# Patient Record
Sex: FEMALE | Race: WHITE | NOT HISPANIC OR LATINO | URBAN - METROPOLITAN AREA
[De-identification: names, ages, dates, MRNs, and addresses within clinical notes are randomized per-mention and may not be internally consistent; named-entity substitution may affect disease eponyms.]

---

## 2018-02-20 ENCOUNTER — INPATIENT (INPATIENT)
Facility: HOSPITAL | Age: 63
LOS: 6 days | Discharge: HOME | DRG: 603 | End: 2018-02-27
Attending: INTERNAL MEDICINE | Admitting: INTERNAL MEDICINE
Payer: MEDICAID

## 2018-02-20 VITALS
HEIGHT: 60 IN | SYSTOLIC BLOOD PRESSURE: 111 MMHG | HEART RATE: 98 BPM | TEMPERATURE: 98 F | WEIGHT: 199.96 LBS | OXYGEN SATURATION: 98 % | RESPIRATION RATE: 16 BRPM | DIASTOLIC BLOOD PRESSURE: 70 MMHG

## 2018-02-20 DIAGNOSIS — L03.90 CELLULITIS, UNSPECIFIED: ICD-10-CM

## 2018-02-20 DIAGNOSIS — Z29.9 ENCOUNTER FOR PROPHYLACTIC MEASURES, UNSPECIFIED: ICD-10-CM

## 2018-02-20 DIAGNOSIS — K74.60 UNSPECIFIED CIRRHOSIS OF LIVER: ICD-10-CM

## 2018-02-20 LAB
ALBUMIN SERPL ELPH-MCNC: 2.3 G/DL — LOW (ref 3.5–5)
ALP SERPL-CCNC: 110 U/L — SIGNIFICANT CHANGE UP (ref 40–120)
ALT FLD-CCNC: 43 U/L DA — SIGNIFICANT CHANGE UP (ref 10–60)
ANION GAP SERPL CALC-SCNC: 6 MMOL/L — SIGNIFICANT CHANGE UP (ref 5–17)
APTT BLD: 44.7 SEC — HIGH (ref 27.5–37.4)
AST SERPL-CCNC: 54 U/L — HIGH (ref 10–40)
BASOPHILS # BLD AUTO: 0.1 K/UL — SIGNIFICANT CHANGE UP (ref 0–0.2)
BASOPHILS NFR BLD AUTO: 0.5 % — SIGNIFICANT CHANGE UP (ref 0–2)
BILIRUB SERPL-MCNC: 3.7 MG/DL — HIGH (ref 0.2–1.2)
BUN SERPL-MCNC: 25 MG/DL — HIGH (ref 7–18)
CALCIUM SERPL-MCNC: 8.6 MG/DL — SIGNIFICANT CHANGE UP (ref 8.4–10.5)
CHLORIDE SERPL-SCNC: 101 MMOL/L — SIGNIFICANT CHANGE UP (ref 96–108)
CO2 SERPL-SCNC: 27 MMOL/L — SIGNIFICANT CHANGE UP (ref 22–31)
CREAT SERPL-MCNC: 0.92 MG/DL — SIGNIFICANT CHANGE UP (ref 0.5–1.3)
EOSINOPHIL # BLD AUTO: 0.1 K/UL — SIGNIFICANT CHANGE UP (ref 0–0.5)
EOSINOPHIL NFR BLD AUTO: 0.9 % — SIGNIFICANT CHANGE UP (ref 0–6)
GLUCOSE SERPL-MCNC: 97 MG/DL — SIGNIFICANT CHANGE UP (ref 70–99)
HCT VFR BLD CALC: 42.6 % — SIGNIFICANT CHANGE UP (ref 34.5–45)
HGB BLD-MCNC: 13.7 G/DL — SIGNIFICANT CHANGE UP (ref 11.5–15.5)
INR BLD: 2.13 RATIO — HIGH (ref 0.88–1.16)
LACTATE SERPL-SCNC: 2 MMOL/L — SIGNIFICANT CHANGE UP (ref 0.7–2)
LYMPHOCYTES # BLD AUTO: 0.8 K/UL — LOW (ref 1–3.3)
LYMPHOCYTES # BLD AUTO: 7.1 % — LOW (ref 13–44)
MCHC RBC-ENTMCNC: 32.3 GM/DL — SIGNIFICANT CHANGE UP (ref 32–36)
MCHC RBC-ENTMCNC: 33.2 PG — SIGNIFICANT CHANGE UP (ref 27–34)
MCV RBC AUTO: 102.9 FL — HIGH (ref 80–100)
MONOCYTES # BLD AUTO: 0.7 K/UL — SIGNIFICANT CHANGE UP (ref 0–0.9)
MONOCYTES NFR BLD AUTO: 6.3 % — SIGNIFICANT CHANGE UP (ref 2–14)
NEUTROPHILS # BLD AUTO: 9.3 K/UL — HIGH (ref 1.8–7.4)
NEUTROPHILS NFR BLD AUTO: 85.3 % — HIGH (ref 43–77)
PLATELET # BLD AUTO: 34 K/UL — LOW (ref 150–400)
POTASSIUM SERPL-MCNC: 3.8 MMOL/L — SIGNIFICANT CHANGE UP (ref 3.5–5.3)
POTASSIUM SERPL-SCNC: 3.8 MMOL/L — SIGNIFICANT CHANGE UP (ref 3.5–5.3)
PROT SERPL-MCNC: 6.6 G/DL — SIGNIFICANT CHANGE UP (ref 6–8.3)
PROTHROM AB SERPL-ACNC: 23.6 SEC — HIGH (ref 9.8–12.7)
RBC # BLD: 4.13 M/UL — SIGNIFICANT CHANGE UP (ref 3.8–5.2)
RBC # FLD: 14.1 % — SIGNIFICANT CHANGE UP (ref 10.3–14.5)
SODIUM SERPL-SCNC: 134 MMOL/L — LOW (ref 135–145)
WBC # BLD: 10.9 K/UL — HIGH (ref 3.8–10.5)
WBC # FLD AUTO: 10.9 K/UL — HIGH (ref 3.8–10.5)

## 2018-02-20 PROCEDURE — 99285 EMERGENCY DEPT VISIT HI MDM: CPT | Mod: 25

## 2018-02-20 PROCEDURE — 99223 1ST HOSP IP/OBS HIGH 75: CPT | Mod: GC

## 2018-02-20 PROCEDURE — 76700 US EXAM ABDOM COMPLETE: CPT | Mod: 26

## 2018-02-20 PROCEDURE — 93971 EXTREMITY STUDY: CPT | Mod: 26,RT

## 2018-02-20 PROCEDURE — 71045 X-RAY EXAM CHEST 1 VIEW: CPT | Mod: 26

## 2018-02-20 RX ORDER — VANCOMYCIN HCL 1 G
1000 VIAL (EA) INTRAVENOUS ONCE
Qty: 0 | Refills: 0 | Status: COMPLETED | OUTPATIENT
Start: 2018-02-20 | End: 2018-02-20

## 2018-02-20 RX ORDER — TRAMADOL HYDROCHLORIDE 50 MG/1
25 TABLET ORAL EVERY 8 HOURS
Qty: 0 | Refills: 0 | Status: DISCONTINUED | OUTPATIENT
Start: 2018-02-20 | End: 2018-02-24

## 2018-02-20 RX ORDER — SODIUM CHLORIDE 9 MG/ML
1000 INJECTION INTRAMUSCULAR; INTRAVENOUS; SUBCUTANEOUS
Qty: 0 | Refills: 0 | Status: DISCONTINUED | OUTPATIENT
Start: 2018-02-20 | End: 2018-02-27

## 2018-02-20 RX ORDER — SPIRONOLACTONE 25 MG/1
25 TABLET, FILM COATED ORAL DAILY
Qty: 0 | Refills: 0 | Status: DISCONTINUED | OUTPATIENT
Start: 2018-02-20 | End: 2018-02-20

## 2018-02-20 RX ORDER — FUROSEMIDE 40 MG
40 TABLET ORAL DAILY
Qty: 0 | Refills: 0 | Status: DISCONTINUED | OUTPATIENT
Start: 2018-02-20 | End: 2018-02-27

## 2018-02-20 RX ORDER — VANCOMYCIN HCL 1 G
1250 VIAL (EA) INTRAVENOUS EVERY 12 HOURS
Qty: 0 | Refills: 0 | Status: DISCONTINUED | OUTPATIENT
Start: 2018-02-20 | End: 2018-02-22

## 2018-02-20 RX ORDER — AMPICILLIN SODIUM AND SULBACTAM SODIUM 250; 125 MG/ML; MG/ML
3 INJECTION, POWDER, FOR SUSPENSION INTRAMUSCULAR; INTRAVENOUS EVERY 6 HOURS
Qty: 0 | Refills: 0 | Status: DISCONTINUED | OUTPATIENT
Start: 2018-02-20 | End: 2018-02-20

## 2018-02-20 RX ORDER — VANCOMYCIN HCL 1 G
1000 VIAL (EA) INTRAVENOUS ONCE
Qty: 0 | Refills: 0 | Status: DISCONTINUED | OUTPATIENT
Start: 2018-02-20 | End: 2018-02-20

## 2018-02-20 RX ORDER — VANCOMYCIN HCL 1 G
1250 VIAL (EA) INTRAVENOUS EVERY 12 HOURS
Qty: 0 | Refills: 0 | Status: DISCONTINUED | OUTPATIENT
Start: 2018-02-20 | End: 2018-02-20

## 2018-02-20 RX ORDER — MORPHINE SULFATE 50 MG/1
4 CAPSULE, EXTENDED RELEASE ORAL ONCE
Qty: 0 | Refills: 0 | Status: DISCONTINUED | OUTPATIENT
Start: 2018-02-20 | End: 2018-02-20

## 2018-02-20 RX ORDER — PIPERACILLIN AND TAZOBACTAM 4; .5 G/20ML; G/20ML
3.38 INJECTION, POWDER, LYOPHILIZED, FOR SOLUTION INTRAVENOUS EVERY 8 HOURS
Qty: 0 | Refills: 0 | Status: DISCONTINUED | OUTPATIENT
Start: 2018-02-20 | End: 2018-02-20

## 2018-02-20 RX ORDER — FUROSEMIDE 40 MG
40 TABLET ORAL DAILY
Qty: 0 | Refills: 0 | Status: DISCONTINUED | OUTPATIENT
Start: 2018-02-20 | End: 2018-02-20

## 2018-02-20 RX ORDER — SPIRONOLACTONE 25 MG/1
25 TABLET, FILM COATED ORAL DAILY
Qty: 0 | Refills: 0 | Status: DISCONTINUED | OUTPATIENT
Start: 2018-02-20 | End: 2018-02-22

## 2018-02-20 RX ORDER — SODIUM CHLORIDE 9 MG/ML
1000 INJECTION INTRAMUSCULAR; INTRAVENOUS; SUBCUTANEOUS ONCE
Qty: 0 | Refills: 0 | Status: COMPLETED | OUTPATIENT
Start: 2018-02-20 | End: 2018-02-20

## 2018-02-20 RX ORDER — PIPERACILLIN AND TAZOBACTAM 4; .5 G/20ML; G/20ML
3.38 INJECTION, POWDER, LYOPHILIZED, FOR SOLUTION INTRAVENOUS ONCE
Qty: 0 | Refills: 0 | Status: DISCONTINUED | OUTPATIENT
Start: 2018-02-20 | End: 2018-02-20

## 2018-02-20 RX ADMIN — TRAMADOL HYDROCHLORIDE 25 MILLIGRAM(S): 50 TABLET ORAL at 19:23

## 2018-02-20 RX ADMIN — Medication 166.67 MILLIGRAM(S): at 19:24

## 2018-02-20 RX ADMIN — TRAMADOL HYDROCHLORIDE 25 MILLIGRAM(S): 50 TABLET ORAL at 21:01

## 2018-02-20 RX ADMIN — SODIUM CHLORIDE 60 MILLILITER(S): 9 INJECTION INTRAMUSCULAR; INTRAVENOUS; SUBCUTANEOUS at 19:24

## 2018-02-20 RX ADMIN — PIPERACILLIN AND TAZOBACTAM 25 GRAM(S): 4; .5 INJECTION, POWDER, LYOPHILIZED, FOR SOLUTION INTRAVENOUS at 14:34

## 2018-02-20 RX ADMIN — SODIUM CHLORIDE 60 MILLILITER(S): 9 INJECTION INTRAMUSCULAR; INTRAVENOUS; SUBCUTANEOUS at 19:23

## 2018-02-20 RX ADMIN — SODIUM CHLORIDE 60 MILLILITER(S): 9 INJECTION INTRAMUSCULAR; INTRAVENOUS; SUBCUTANEOUS at 14:34

## 2018-02-20 RX ADMIN — MORPHINE SULFATE 4 MILLIGRAM(S): 50 CAPSULE, EXTENDED RELEASE ORAL at 06:56

## 2018-02-20 RX ADMIN — SODIUM CHLORIDE 1000 MILLILITER(S): 9 INJECTION INTRAMUSCULAR; INTRAVENOUS; SUBCUTANEOUS at 06:57

## 2018-02-20 RX ADMIN — Medication 250 MILLIGRAM(S): at 06:56

## 2018-02-20 NOTE — H&P ADULT - RS GEN PE MLT RESP DETAILS PC
normal/clear to auscultation bilaterally/respirations non-labored/airway patent/breath sounds equal/good air movement

## 2018-02-20 NOTE — H&P ADULT - ATTENDING COMMENTS
Patient was seen and examined at bedside,  Briefly, this is a 62 years old lady, Khmer speaking, History again obtained with assistance of  Edward # [id number], ]  patient was recently admitted to [facility] where she follows and receives her medications, she was given IV antibiotic for same condition: cellulitis of the Right LE, and was discharged on Bactrim which she finished 1week ago, however her leg restarted to swell and hurt again.   she is known to have liver cirrhosis, diagnosed 2-3 years ago, denies any previous paracentesis or GI bleed, however states she had some confusion before, and maybe was admitted to the hospital because of that. She is on Rifaximin.  When asked about what she takes for her pain, she answered Tylenol, says was never told that she is not supposed to take it.   Physical exam:  vitals  HEENT: Neck supple  heart: S1 S2 normal  Abdomen: not distended, says is tender to the right upper quadrant.   Chest: Clear  LE: Left LE: no edema  Right LE: firy red looking, edematous, warm, with extension of the cellulitis up to the thigh.     A/P  1- Right Lower extremity Cellulitis:  failed inpatient and outpatient treatment,   unclear etiology,   ID consult: Dr. Simental  meanwhile start broad spectrum antibiotics including coverage for MRSA  vanco and zosyn.   leg elevation  continue Lasix    2- Liver cirrhosis: compensated at this moment:  denies any alcohol or hepatitis, says she does not know why it happened  Needs GI outpatient     3- Thrombocytopenia: secondary to liver cirrhosis.  4- Macrocytic anemia: could be secondary to liver cirrhosis  check b12 and folate levels.

## 2018-02-20 NOTE — H&P ADULT - NEGATIVE OPHTHALMOLOGIC SYMPTOMS
no discharge R/no pain L/no scleral injection R/no lacrimation L/no blurred vision R/no diplopia/no photophobia/no discharge L/no pain R/no irritation R/no loss of vision L/no loss of vision R/no scleral injection L/no irritation L/no lacrimation R/no blurred vision L

## 2018-02-20 NOTE — H&P ADULT - PROBLEM SELECTOR PLAN 2
Patient with underlying cirrhosis, Patient says that etiology of cirrhosis is not known, Will send hepatitis panel, Us hepatic, Has low albumin, elevated INR, plt count is still testing  plan to c/w rifaximin, lactulose and lasix as tolerated by her BP  Will send ammonia for AM Patient with underlying cirrhosis, Patient says that etiology of cirrhosis is not known, Will send hepatitis panel, Us Abd to evaluate for cirrhosis and ascitis,   Has low albumin, elevated INR, plt count is still testing  plan to c/w rifaximin, lactulose and lasix as tolerated by her BP  Will send ammonia for AM, and total and direct bili  Child Benton score for for mortality shows a life expectancy of 1-3 yrs  MELD-Na score: 13, indicating 7-10% 30 day mortality Patient with underlying cirrhosis, Patient says that etiology of cirrhosis is not known, Will send hepatitis panel, Us Abd to evaluate for cirrhosis and ascitis,   Has low albumin, elevated INR, plt count is 34  plan to c/w rifaximin, lactulose and lasix as tolerated by her BP  Will send ammonia for AM, and total and direct bili  Child Benton score for for mortality shows a life expectancy of 1-3 yrs  MELD-Na score: 13, indicating 7-10% 30 day mortality Patient with underlying cirrhosis, Patient says that etiology of cirrhosis is not known, Will send hepatitis panel, Us Abd to evaluate for cirrhosis and ascitis,   Has low albumin, elevated INR, plt count is 34  plan to c/w rifaximin, aldactone and lasix as tolerated by her BP  Will send ammonia for AM, and total and direct bili  Child Benton score for for mortality shows a life expectancy of 1-3 yrs  MELD-Na score: 13, indicating 7-10% 30 day mortality

## 2018-02-20 NOTE — H&P ADULT - HISTORY OF PRESENT ILLNESS
Patient is a 62/F from home, lives with her daughter, AAO*3, Luxembourger speaking, with PMHx of liver cirrhosis (on rifaximin, aldectone and laxix), does not know the etiology of the cirrhosis, and cellulitis in the same leg comes in with redness, pain and swelling over the right leg since saturday. patient was seen at another hospital in [place] 1 month ago, was Dx with cellulitis, given Abx that she finished 2 weeks ago, however patient comes in with recurrent symptoms. Also admits to subjective fever and chills for the past 1-2 days. Patient does not have a PCP, her PCP has moved to [place]. She said that  gave her Medicaid and she gets her meds from [facility]. not on blood thinner and no h/o blood clots. ROS negative for SOB, cough, headache, dizziness, abdominal pain, N/V/D or any other symptoms  Never smoker/drinker, FHx of Ca stomach in mother and Shx of Breast Cancer ( 15 years ago, s/p Sx) Patient is a 62/F from home, lives with her daughter, AAO*3, Nicaraguan speaking, with PMHx of liver cirrhosis (on rifaximin, aldectone and laxix), does not know the etiology of the cirrhosis, erythema Multiforme rash in the past, and cellulitis in the same leg comes in with redness, pain and swelling over the right leg since saturday. patient was seen at another hospital in [place] 1 month ago, was Dx with cellulitis, given Abx that she finished 2 weeks ago, however patient comes in with recurrent symptoms. Also admits to subjective fever and chills for the past 1-2 days. Patient does not have a PCP, her PCP has moved to [place]. She said that  gave her Medicaid and she gets her meds from [facility]. not on blood thinner and no h/o blood clots. ROS negative for SOB, cough, headache, dizziness, abdominal pain, N/V/D or any other symptoms  Never smoker/drinker, FHx of Ca stomach in mother and Shx of Breast Cancer ( 15 years ago, s/p Sx) Patient is a 62/F from home, lives with her daughter, AAO*3, Panamanian speaking, with PMHx of liver cirrhosis (on rifaximin, aldactone and laxix), does not know the etiology of the cirrhosis, erythema Multiforme rash in the past, and cellulitis in the same leg comes in with redness, pain and swelling over the right leg since saturday. patient was seen at another hospital in [place] 1 month ago, was Dx with cellulitis, given Abx that she finished 2 weeks ago, however patient comes in with recurrent symptoms. Also admits to subjective fever and chills for the past 1-2 days. Patient does not have a PCP, her PCP has moved to [place]. She said that  gave her Medicaid and she gets her meds from [facility]. not on blood thinner and no h/o blood clots. ROS negative for SOB, cough, headache, dizziness, abdominal pain, N/V/D or any other symptoms  Never smoker/drinker, FHx of Ca stomach in mother and Shx of Breast Cancer ( 15 years ago, s/p Sx) Patient is a 62/F from home, lives with her daughter, AAO*3, Chadian speaking, with PMHx of liver cirrhosis (on rifaximin, aldactone and laxix), does not know the etiology of the cirrhosis, erythema Multiforme rash in the past, and cellulitis in the same leg comes in with redness, pain and swelling over the right leg since saturday. patient was seen at another hospital in [place] 1 month ago, was Dx with cellulitis, given Abx that she finished 2 weeks ago, however patient comes in with recurrent symptoms. Also admits to subjective fever and chills for the past 1-2 days. Patient does not have a PCP, her PCP has moved to [place]. She said that  gave her Medicaid and she gets her meds from [facility]. not on blood thinner and no h/o blood clots. ROS negative for SOB, cough, headache, dizziness, abdominal pain, N/V/D or any other symptoms  Never smoker/drinker, FHx of Ca stomach in mother and Shx of Breast Cancer ( 15 years ago, s/p L lumpectomy) and R leg Sx (? for fracture), after she fell in her kitchen

## 2018-02-20 NOTE — H&P ADULT - ASSESSMENT
Patient is a 62/F from home, lives with her daughter, AAO*3, Ukrainian speaking, with PMHx of liver cirrhosis (on rifaximin, aldectone and laxix), does not know the etiology of the cirrhosis, erythema Multiforme rash in the past, and cellulitis in the same leg comes in with redness, pain and swelling over the right leg since saturday.  Patient is afebrile in the ED, BP on the lower side, latest reading was 92/35, got 1 L NS bolus, however patient is asymptomatic. no leucocytosis on the CBC, has macrocytosis: 102.9, INR is elevated, 2.13, could be 2/2 to liver cirrhosis, lactate is WNL, albumin is 2.3, Plt count is still testing.  s/p vanc and zosyn in the ED, and morphine 4mg IV*1 for pain  Admitted for RLE cellulitis Patient is a 62/F from home, lives with her daughter, AAO*3, Divehi speaking, with PMHx of liver cirrhosis (on rifaximin, aldactone and laxix), does not know the etiology of the cirrhosis, erythema Multiforme rash in the past, and cellulitis in the same leg comes in with redness, pain and swelling over the right leg since saturday.  Patient is afebrile in the ED, BP on the lower side, latest reading was 92/35, got 1 L NS bolus, however patient is asymptomatic. no leucocytosis on the CBC, has macrocytosis: 102.9, INR is elevated, 2.13, could be 2/2 to liver cirrhosis, lactate is WNL, albumin is 2.3, Plt count is still testing.  s/p vanc and zosyn in the ED, and morphine 4mg IV*1 for pain  Admitted for RLE cellulitis Patient is a 62/F from home, lives with her daughter, AAO*3, Hungarian speaking, with PMHx of liver cirrhosis (on rifaximin, aldactone and laxix), does not know the etiology of the cirrhosis, erythema Multiforme rash in the past, and cellulitis in the same leg comes in with redness, pain and swelling over the right leg since saturday.  Patient is afebrile in the ED, BP on the lower side, latest reading was 92/35, got 1 L NS bolus, however patient is asymptomatic. no leucocytosis on the CBC, has macrocytosis: 102.9, INR is elevated, 2.13, could be 2/2 to liver cirrhosis, lactate is WNL, albumin is 2.3, Plt count is 34  s/p vanc and zosyn in the ED, and morphine 4mg IV*1 for pain  Admitted for RLE cellulitis

## 2018-02-20 NOTE — H&P ADULT - NEGATIVE NEUROLOGICAL SYMPTOMS
no confusion/no loss of consciousness/no hemiparesis/no transient paralysis/no weakness/no facial palsy/no syncope/no tremors/no headache/no generalized seizures/no vertigo/no loss of sensation/no focal seizures/no difficulty walking/no paresthesias

## 2018-02-20 NOTE — H&P ADULT - NEGATIVE MUSCULOSKELETAL SYMPTOMS
no muscle cramps/no arm pain L/no back pain/no muscle weakness/no neck pain/no leg pain R/no joint swelling/no arthralgia/no arthritis/no myalgia/no stiffness/no arm pain R/no leg pain L

## 2018-02-20 NOTE — H&P ADULT - NEGATIVE SKIN SYMPTOMS
no brittle nails/no dryness/no change in size/color of mole/no pitted nails/no hair loss/no tumor/no itching/no rash

## 2018-02-20 NOTE — H&P ADULT - GASTROINTESTINAL DETAILS
no masses palpable/bowel sounds normal/no distention/nontender/normal/soft bowel sounds normal/no masses palpable/normal/soft/nontender

## 2018-02-20 NOTE — H&P ADULT - NEGATIVE ENMT SYMPTOMS
no nose bleeds/no dry mouth/no nasal congestion/no nasal obstruction/no hearing difficulty/no vertigo/no nasal discharge/no throat pain/no dysphagia/no ear pain/no tinnitus/no recurrent cold sores/no post-nasal discharge/no abnormal taste sensation/no gum bleeding/no sinus symptoms

## 2018-02-20 NOTE — CONSULT NOTE ADULT - SUBJECTIVE AND OBJECTIVE BOX
HPI:  Patient is a 62/F from home, lives with her daughter, AAO*3, Marshallese speaking, with PMHx of liver cirrhosis (on rifaximin, aldactone and laxix), does not know the etiology of the cirrhosis, erythema Multiforme rash in the past, and cellulitis in the same leg comes in with redness, pain and swelling over the right leg since saturday. patient was seen at another hospital in [place] 1 month ago, was Dx with cellulitis, given Abx that she finished 2 weeks ago, however patient comes in with recurrent symptoms. Also admits to subjective fever and chills for the past 1-2 days. Patient does not have a PCP, her PCP has moved to [place]. She said that  gave her Medicaid and she gets her meds from [facility].  ROS negative for SOB, cough, headache, dizziness, abdominal pain, N/V/D or any other symptoms  Never smoker/drinker, FHx of Ca stomach in mother and Shx of Breast Cancer ( 15 years ago, s/p L lumpectomy) and R leg Sx (? for fracture), after she fell in her kitchen       PAST MEDICAL & SURGICAL HISTORY:  Hepatitis C  S/P hysterectomy      No Known Allergies      Meds:  furosemide    Tablet 40 milliGRAM(s) Oral daily  piperacillin/tazobactam IVPB. 3.375 Gram(s) IV Intermittent every 8 hours  rifaximin 550 milliGRAM(s) Oral two times a day  sodium chloride 0.9%. 1000 milliLiter(s) IV Continuous <Continuous>  spironolactone 25 milliGRAM(s) Oral daily  traMADol 25 milliGRAM(s) Oral every 8 hours PRN  vancomycin  IVPB 1000 milliGRAM(s) IV Intermittent once      SOCIAL HISTORY:  Smoker:  no    FAMILY HISTORY: as above      VITALS:  Vital Signs Last 24 Hrs  T(C): 36.6 (20 Feb 2018 14:43), Max: 36.8 (20 Feb 2018 02:12)  T(F): 97.8 (20 Feb 2018 14:43), Max: 98.3 (20 Feb 2018 02:12)  HR: 91 (20 Feb 2018 14:43) (89 - 98)  BP: 116/57 (20 Feb 2018 14:43) (92/35 - 116/57)  BP(mean): --  RR: 16 (20 Feb 2018 14:43) (16 - 18)  SpO2: 100% (20 Feb 2018 14:43) (98% - 100%)    LABS/DIAGNOSTIC TESTS:                          13.7   10.9  )-----------( 34       ( 20 Feb 2018 06:56 )             42.6     WBC Count: 10.9 K/uL (02-20 @ 06:56)      02-20    134<L>  |  101  |  25<H>  ----------------------------<  97  3.8   |  27  |  0.92    Ca    8.6      20 Feb 2018 06:56    TPro  6.6  /  Alb  2.3<L>  /  TBili  3.7<H>  /  DBili  x   /  AST  54<H>  /  ALT  43  /  AlkPhos  110  02-20          LIVER FUNCTIONS - ( 20 Feb 2018 06:56 )  Alb: 2.3 g/dL / Pro: 6.6 g/dL / ALK PHOS: 110 U/L / ALT: 43 U/L DA / AST: 54 U/L / GGT: x             PT/INR - ( 20 Feb 2018 06:56 )   PT: 23.6 sec;   INR: 2.13 ratio         PTT - ( 20 Feb 2018 06:56 )  PTT:44.7 sec    LACTATE:Lactate, Blood: 2.0 mmol/L (02-20 @ 06:51)      ABG -     CULTURES:       RADIOLOGY: < from: US Duplex Venous Lower Ext Ltd, Right (02.20.18 @ 12:15) >  EXAM:  US DPLX LWR EXT VEINS LTD RT                            PROCEDURE DATE:  02/20/2018          INTERPRETATION:  CLINICAL STATEMENT: Swelling leg.    TECHNIQUE: Ultrasound of right lower extremity deep venous system.    COMPARISON: None.    FINDINGS:  There is color and spectral flow, compression and augmentation of the   common femoral, superficial femoral and popliteal veins.    There is flow in the posterior tibial vein.    IMPRESSION:  No evidence of DVT.        < from: Xray Chest 1 View AP/PA (02.20.18 @ 08:39) >  EXAM:  XR CHEST AP OR PA 1V                            PROCEDURE DATE:  02/20/2018          INTERPRETATION:  AP chest on February 20, 2018 at 8:23 AM. Patient has   cellulitis and is scheduled for admission.    The heart is magnified by technique.    The lung fields and pleural surfaces are unremarkable.    The chest is similar to April 18, 2014.    IMPRESSION: No infiltrate.    < end of copied text >          ROS  [  ] UNABLE TO ELICIT HPI:  Patient is a 62/F from home, lives with her daughter, AAO*3, Afghan speaking, with PMHx of liver cirrhosis (on rifaximin, aldactone and laxix), does not know the etiology of the cirrhosis, erythema Multiforme rash in the past, and cellulitis in the same leg comes in with redness, pain and swelling over the right leg since saturday. patient was seen at another hospital in [place] 1 month ago, was Dx with cellulitis, given Abx that she finished 2 weeks ago, however patient comes in with recurrent symptoms. Also admits to subjective fever and chills for the past 1-2 days. Patient does not have a PCP, her PCP has moved to [place]. She said that  gave her Medicaid and she gets her meds from [facility].  ROS negative for SOB, cough, headache, dizziness, abdominal pain, N/V/D or any other symptoms.  pt c/o a lot of pain in her right leg along with redness and warmth.  Never smoker/drinker, FHx of Ca stomach in mother and Shx of Breast Cancer ( 15 years ago, s/p L lumpectomy) and R leg Sx (? for fracture), after she fell in her kitchen       PAST MEDICAL & SURGICAL HISTORY:  Hepatitis C  S/P hysterectomy      No Known Allergies      Meds:  furosemide    Tablet 40 milliGRAM(s) Oral daily  piperacillin/tazobactam IVPB. 3.375 Gram(s) IV Intermittent every 8 hours  rifaximin 550 milliGRAM(s) Oral two times a day  sodium chloride 0.9%. 1000 milliLiter(s) IV Continuous <Continuous>  spironolactone 25 milliGRAM(s) Oral daily  traMADol 25 milliGRAM(s) Oral every 8 hours PRN  vancomycin  IVPB 1000 milliGRAM(s) IV Intermittent once      SOCIAL HISTORY:  Smoker:  no    FAMILY HISTORY: as above      VITALS:  Vital Signs Last 24 Hrs  T(C): 36.6 (20 Feb 2018 14:43), Max: 36.8 (20 Feb 2018 02:12)  T(F): 97.8 (20 Feb 2018 14:43), Max: 98.3 (20 Feb 2018 02:12)  HR: 91 (20 Feb 2018 14:43) (89 - 98)  BP: 116/57 (20 Feb 2018 14:43) (92/35 - 116/57)  BP(mean): --  RR: 16 (20 Feb 2018 14:43) (16 - 18)  SpO2: 100% (20 Feb 2018 14:43) (98% - 100%)    LABS/DIAGNOSTIC TESTS:                          13.7   10.9  )-----------( 34       ( 20 Feb 2018 06:56 )             42.6     WBC Count: 10.9 K/uL (02-20 @ 06:56)      02-20    134<L>  |  101  |  25<H>  ----------------------------<  97  3.8   |  27  |  0.92    Ca    8.6      20 Feb 2018 06:56    TPro  6.6  /  Alb  2.3<L>  /  TBili  3.7<H>  /  DBili  x   /  AST  54<H>  /  ALT  43  /  AlkPhos  110  02-20          LIVER FUNCTIONS - ( 20 Feb 2018 06:56 )  Alb: 2.3 g/dL / Pro: 6.6 g/dL / ALK PHOS: 110 U/L / ALT: 43 U/L DA / AST: 54 U/L / GGT: x             PT/INR - ( 20 Feb 2018 06:56 )   PT: 23.6 sec;   INR: 2.13 ratio         PTT - ( 20 Feb 2018 06:56 )  PTT:44.7 sec    LACTATE:Lactate, Blood: 2.0 mmol/L (02-20 @ 06:51)      ABG -     CULTURES:       RADIOLOGY: < from: US Duplex Venous Lower Ext Ltd, Right (02.20.18 @ 12:15) >  EXAM:  US DPLX LWR EXT VEINS LTD RT                            PROCEDURE DATE:  02/20/2018          INTERPRETATION:  CLINICAL STATEMENT: Swelling leg.    TECHNIQUE: Ultrasound of right lower extremity deep venous system.    COMPARISON: None.    FINDINGS:  There is color and spectral flow, compression and augmentation of the   common femoral, superficial femoral and popliteal veins.    There is flow in the posterior tibial vein.    IMPRESSION:  No evidence of DVT.        < from: Xray Chest 1 View AP/PA (02.20.18 @ 08:39) >  EXAM:  XR CHEST AP OR PA 1V                            PROCEDURE DATE:  02/20/2018          INTERPRETATION:  AP chest on February 20, 2018 at 8:23 AM. Patient has   cellulitis and is scheduled for admission.    The heart is magnified by technique.    The lung fields and pleural surfaces are unremarkable.    The chest is similar to April 18, 2014.    IMPRESSION: No infiltrate.    < end of copied text >          ROS  [  ] UNABLE TO ELICIT

## 2018-02-20 NOTE — H&P ADULT - NEGATIVE CARDIOVASCULAR SYMPTOMS
no palpitations/no chest pain/no dyspnea on exertion/no claudication/no paroxysmal nocturnal dyspnea/no orthopnea

## 2018-02-20 NOTE — H&P ADULT - NEGATIVE GENERAL SYMPTOMS
no weight loss/no polyphagia/no fatigue/no weight gain/no polyuria/no polydipsia/no sweating/no anorexia

## 2018-02-20 NOTE — H&P ADULT - NEGATIVE GASTROINTESTINAL SYMPTOMS
no steatorrhea/no constipation/no change in bowel habits/no jaundice/no melena/no nausea/no abdominal pain/no hematochezia/no hiccoughs/no vomiting/no diarrhea/no flatulence

## 2018-02-20 NOTE — PATIENT PROFILE ADULT. - AS SC BRADEN FRICTION
Outside clearance , echo, labs reviewed . Lab orders received per Dr Beckford. States pt to stop Xaralto per cardiologist..    (3) no apparent problem

## 2018-02-20 NOTE — H&P ADULT - NEGATIVE GENERAL GENITOURINARY SYMPTOMS
no urine discoloration/no renal colic/no urinary hesitancy/no incontinence/no flank pain L/no gas in urine/no nocturia/normal libido/no dysuria/no flank pain R/no bladder infections/normal urinary frequency/no hematuria

## 2018-02-20 NOTE — ED ADULT NURSE NOTE - OBJECTIVE STATEMENT
pt is A&Ox3, ambulatory, able to make needs known and presents with chronically swollen right leg that is +4 edematous. Pt reports leg swelling x 1 to 2 years but has recently worsened and now has difficulty bearing weight on leg and its has become painful. Leg is red in color, warm, to the tough and slightly weeping. pt denies any other complaints.

## 2018-02-20 NOTE — ED PROVIDER NOTE - OBJECTIVE STATEMENT
61 y/o F pt with PMHx of cirrhosis, BLE edema, and hepatitis C presents to ED c/o worsening RLE edema x 1 day. Pt denies fever, chills, or any other complaints. NKDA.

## 2018-02-21 LAB
24R-OH-CALCIDIOL SERPL-MCNC: 15.2 NG/ML — LOW (ref 30–80)
ALBUMIN SERPL ELPH-MCNC: 2.2 G/DL — LOW (ref 3.5–5)
ALP SERPL-CCNC: 114 U/L — SIGNIFICANT CHANGE UP (ref 40–120)
ALT FLD-CCNC: 37 U/L DA — SIGNIFICANT CHANGE UP (ref 10–60)
AMMONIA BLD-MCNC: 72 UMOL/L — HIGH (ref 11–32)
ANION GAP SERPL CALC-SCNC: 7 MMOL/L — SIGNIFICANT CHANGE UP (ref 5–17)
APTT BLD: 37.7 SEC — HIGH (ref 27.5–37.4)
AST SERPL-CCNC: 52 U/L — HIGH (ref 10–40)
BASOPHILS # BLD AUTO: 0 K/UL — SIGNIFICANT CHANGE UP (ref 0–0.2)
BASOPHILS NFR BLD AUTO: 0.6 % — SIGNIFICANT CHANGE UP (ref 0–2)
BILIRUB DIRECT SERPL-MCNC: 1.1 MG/DL — HIGH (ref 0–0.2)
BILIRUB INDIRECT FLD-MCNC: 1.7 MG/DL — HIGH (ref 0.2–1)
BILIRUB SERPL-MCNC: 2.8 MG/DL — HIGH (ref 0.2–1.2)
BILIRUB SERPL-MCNC: 2.8 MG/DL — HIGH (ref 0.2–1.2)
BUN SERPL-MCNC: 14 MG/DL — SIGNIFICANT CHANGE UP (ref 7–18)
CALCIUM SERPL-MCNC: 8.1 MG/DL — LOW (ref 8.4–10.5)
CHLORIDE SERPL-SCNC: 103 MMOL/L — SIGNIFICANT CHANGE UP (ref 96–108)
CHOLEST SERPL-MCNC: 90 MG/DL — SIGNIFICANT CHANGE UP (ref 10–199)
CO2 SERPL-SCNC: 25 MMOL/L — SIGNIFICANT CHANGE UP (ref 22–31)
CREAT SERPL-MCNC: 0.55 MG/DL — SIGNIFICANT CHANGE UP (ref 0.5–1.3)
EOSINOPHIL # BLD AUTO: 0.1 K/UL — SIGNIFICANT CHANGE UP (ref 0–0.5)
EOSINOPHIL NFR BLD AUTO: 1.3 % — SIGNIFICANT CHANGE UP (ref 0–6)
FOLATE SERPL-MCNC: 11.6 NG/ML — SIGNIFICANT CHANGE UP (ref 4.8–24.2)
GLUCOSE SERPL-MCNC: 98 MG/DL — SIGNIFICANT CHANGE UP (ref 70–99)
HAV IGM SER-ACNC: SIGNIFICANT CHANGE UP
HBA1C BLD-MCNC: 4.6 % — SIGNIFICANT CHANGE UP (ref 4–5.6)
HBV CORE IGM SER-ACNC: SIGNIFICANT CHANGE UP
HBV SURFACE AG SER-ACNC: SIGNIFICANT CHANGE UP
HCT VFR BLD CALC: 42.5 % — SIGNIFICANT CHANGE UP (ref 34.5–45)
HCV AB S/CO SERPL IA: 13.2 S/CO — SIGNIFICANT CHANGE UP
HCV AB SERPL-IMP: REACTIVE
HDLC SERPL-MCNC: 35 MG/DL — LOW (ref 40–125)
HGB BLD-MCNC: 14.1 G/DL — SIGNIFICANT CHANGE UP (ref 11.5–15.5)
HIV 1+2 AB+HIV1 P24 AG SERPL QL IA: SIGNIFICANT CHANGE UP
INR BLD: 1.71 RATIO — HIGH (ref 0.88–1.16)
LIPID PNL WITH DIRECT LDL SERPL: 42 MG/DL — SIGNIFICANT CHANGE UP
LYMPHOCYTES # BLD AUTO: 0.8 K/UL — LOW (ref 1–3.3)
LYMPHOCYTES # BLD AUTO: 11.3 % — LOW (ref 13–44)
MAGNESIUM SERPL-MCNC: 1.5 MG/DL — LOW (ref 1.6–2.6)
MCHC RBC-ENTMCNC: 33.2 GM/DL — SIGNIFICANT CHANGE UP (ref 32–36)
MCHC RBC-ENTMCNC: 33.9 PG — SIGNIFICANT CHANGE UP (ref 27–34)
MCV RBC AUTO: 102.1 FL — HIGH (ref 80–100)
MONOCYTES # BLD AUTO: 0.5 K/UL — SIGNIFICANT CHANGE UP (ref 0–0.9)
MONOCYTES NFR BLD AUTO: 6.4 % — SIGNIFICANT CHANGE UP (ref 2–14)
NEUTROPHILS # BLD AUTO: 5.9 K/UL — SIGNIFICANT CHANGE UP (ref 1.8–7.4)
NEUTROPHILS NFR BLD AUTO: 80.4 % — HIGH (ref 43–77)
PHOSPHATE SERPL-MCNC: 2 MG/DL — LOW (ref 2.5–4.5)
PLATELET # BLD AUTO: 41 K/UL — LOW (ref 150–400)
POTASSIUM SERPL-MCNC: 3.8 MMOL/L — SIGNIFICANT CHANGE UP (ref 3.5–5.3)
POTASSIUM SERPL-SCNC: 3.8 MMOL/L — SIGNIFICANT CHANGE UP (ref 3.5–5.3)
PROT SERPL-MCNC: 7.1 G/DL — SIGNIFICANT CHANGE UP (ref 6–8.3)
PROTHROM AB SERPL-ACNC: 18.9 SEC — HIGH (ref 9.8–12.7)
RBC # BLD: 4.16 M/UL — SIGNIFICANT CHANGE UP (ref 3.8–5.2)
RBC # FLD: 14 % — SIGNIFICANT CHANGE UP (ref 10.3–14.5)
SODIUM SERPL-SCNC: 135 MMOL/L — SIGNIFICANT CHANGE UP (ref 135–145)
TOTAL CHOLESTEROL/HDL RATIO MEASUREMENT: 2.6 RATIO — LOW (ref 3.3–7.1)
TRIGL SERPL-MCNC: 65 MG/DL — SIGNIFICANT CHANGE UP (ref 10–149)
TSH SERPL-MCNC: 3.38 UU/ML — SIGNIFICANT CHANGE UP (ref 0.34–4.82)
VIT B12 SERPL-MCNC: 1699 PG/ML — HIGH (ref 232–1245)
WBC # BLD: 7.4 K/UL — SIGNIFICANT CHANGE UP (ref 3.8–10.5)
WBC # FLD AUTO: 7.4 K/UL — SIGNIFICANT CHANGE UP (ref 3.8–10.5)

## 2018-02-21 PROCEDURE — 99233 SBSQ HOSP IP/OBS HIGH 50: CPT | Mod: GC

## 2018-02-21 RX ORDER — MAGNESIUM SULFATE 500 MG/ML
1 VIAL (ML) INJECTION ONCE
Qty: 0 | Refills: 0 | Status: COMPLETED | OUTPATIENT
Start: 2018-02-21 | End: 2018-02-21

## 2018-02-21 RX ORDER — SODIUM,POTASSIUM PHOSPHATES 278-250MG
1 POWDER IN PACKET (EA) ORAL
Qty: 0 | Refills: 0 | Status: COMPLETED | OUTPATIENT
Start: 2018-02-21 | End: 2018-02-23

## 2018-02-21 RX ORDER — LACTULOSE 10 G/15ML
20 SOLUTION ORAL ONCE
Qty: 0 | Refills: 0 | Status: COMPLETED | OUTPATIENT
Start: 2018-02-21 | End: 2018-02-21

## 2018-02-21 RX ORDER — LACTULOSE 10 G/15ML
20 SOLUTION ORAL AT BEDTIME
Qty: 0 | Refills: 0 | Status: DISCONTINUED | OUTPATIENT
Start: 2018-02-21 | End: 2018-02-27

## 2018-02-21 RX ADMIN — LACTULOSE 20 GRAM(S): 10 SOLUTION ORAL at 18:02

## 2018-02-21 RX ADMIN — Medication 100 GRAM(S): at 09:15

## 2018-02-21 RX ADMIN — Medication 1 TABLET(S): at 12:55

## 2018-02-21 RX ADMIN — SPIRONOLACTONE 25 MILLIGRAM(S): 25 TABLET, FILM COATED ORAL at 05:41

## 2018-02-21 RX ADMIN — Medication 40 MILLIGRAM(S): at 05:41

## 2018-02-21 RX ADMIN — LACTULOSE 20 GRAM(S): 10 SOLUTION ORAL at 22:57

## 2018-02-21 RX ADMIN — Medication 166.67 MILLIGRAM(S): at 18:02

## 2018-02-21 RX ADMIN — Medication 1 TABLET(S): at 18:02

## 2018-02-21 RX ADMIN — Medication 166.67 MILLIGRAM(S): at 05:41

## 2018-02-21 NOTE — PROGRESS NOTE ADULT - PROBLEM SELECTOR PLAN 2
Patient with underlying cirrhosis, Patient says that etiology of cirrhosis is not known, Will send hepatitis panel, Us Abd to evaluate for cirrhosis and ascitis,   Has low albumin, elevated INR, plt count is 34  plan to c/w rifaximin, aldactone and lasix as tolerated by her BP  Will send ammonia for AM, and total and direct bili  Child Benton score for for mortality shows a life expectancy of 1-3 yrs  MELD-Na score: 13, indicating 7-10% 30 day mortality -Cirrhosis of unknown etiology  -f/u hepatitis panel   -INR and platelet count improving  -Hyperammonemia w/o focal neurological findings: 72  -c/w rifaximin, aldactone and lasix as tolerated by her BP  -MELD-Na score: 13, indicating 7-10% 30 day mortality

## 2018-02-21 NOTE — PROGRESS NOTE ADULT - PROBLEM SELECTOR PLAN 3
IMPROVE VTE score: 1 for age>60, no PPX needed however INR is elevated to 2.13 -IMPROVE VTE score: 1 for age>60, no PPX needed at this time.    -Pt is ambulating

## 2018-02-21 NOTE — PROGRESS NOTE ADULT - SUBJECTIVE AND OBJECTIVE BOX
PGY 1 Note discussed with supervising resident and primary attending    Patient is a 62y old  Female who presents with a chief complaint of cellulitis, r leg (20 Feb 2018 09:31)      INTERVAL HPI/OVERNIGHT EVENTS: no new complaints    MEDICATIONS  (STANDING):  furosemide    Tablet 40 milliGRAM(s) Oral daily  rifaximin 550 milliGRAM(s) Oral two times a day  sodium chloride 0.9%. 1000 milliLiter(s) (60 mL/Hr) IV Continuous <Continuous>  spironolactone 25 milliGRAM(s) Oral daily  vancomycin  IVPB 1250 milliGRAM(s) IV Intermittent every 12 hours    MEDICATIONS  (PRN):  traMADol 25 milliGRAM(s) Oral every 8 hours PRN Severe Pain (7 - 10)      __________________________________________________  REVIEW OF SYSTEMS:    CONSTITUTIONAL: No fever,   EYES: no acute visual disturbances  NECK: No pain or stiffness  RESPIRATORY: No cough; No shortness of breath  CARDIOVASCULAR: No chest pain, no palpitations  GASTROINTESTINAL: No pain. No nausea or vomiting; No diarrhea   NEUROLOGICAL: No headache or numbness, no tremors  MUSCULOSKELETAL: No joint pain, no muscle pain  GENITOURINARY: no dysuria, no frequency, no hesitancy  PSYCHIATRY: no depression , no anxiety  ALL OTHER  ROS negative        Vital Signs Last 24 Hrs  T(C): 37.1 (21 Feb 2018 05:15), Max: 37.7 (20 Feb 2018 22:47)  T(F): 98.7 (21 Feb 2018 05:15), Max: 99.8 (20 Feb 2018 22:47)  HR: 94 (21 Feb 2018 05:15) (89 - 101)  BP: 123/60 (21 Feb 2018 05:15) (92/35 - 170/72)  BP(mean): --  RR: 17 (21 Feb 2018 05:15) (16 - 18)  SpO2: 100% (21 Feb 2018 05:15) (98% - 100%)    ________________________________________________  PHYSICAL EXAM:  GENERAL: NAD  HEENT:Normocephalic;  conjunctivae and sclerae clear; moist mucous membranes;   NECK : supple  CHEST/LUNG: Clear to auscuitation bilaterally with good air entry   HEART: S1 S2  regular; no murmurs, gallops or rubs  ABDOMEN: Soft, Nontender, Nondistended; Bowel sounds present  EXTREMITIES: no cyanosis; no edema; no calf tenderness  NERVOUS SYSTEM:  Awake and alert; Oriented  to place, person and time ; no new deficits    _________________________________________________  LABS:                        13.7   10.9  )-----------( 34       ( 20 Feb 2018 06:56 )             42.6     02-20    134<L>  |  101  |  25<H>  ----------------------------<  97  3.8   |  27  |  0.92    Ca    8.6      20 Feb 2018 06:56    TPro  6.6  /  Alb  2.3<L>  /  TBili  3.7<H>  /  DBili  x   /  AST  54<H>  /  ALT  43  /  AlkPhos  110  02-20    PT/INR - ( 20 Feb 2018 06:56 )   PT: 23.6 sec;   INR: 2.13 ratio         PTT - ( 20 Feb 2018 06:56 )  PTT:44.7 sec    CAPILLARY BLOOD GLUCOSE            RADIOLOGY & ADDITIONAL TESTS:    Imaging Personally Reviewed:  YES/NO    Consultant(s) Notes Reviewed:   YES/ No    Care Discussed with Consultants :     Plan of care was discussed with patient and /or primary care giver; all questions and concerns were addressed and care was aligned with patient's wishes. PGY 1 Note discussed with supervising resident and primary attending    Patient is a 62y old  Female who presents with a chief complaint of cellulitis, r leg (20 Feb 2018 09:31)    INTERVAL HPI/OVERNIGHT EVENTS: No acute events reported overnight.  Today pt presents in no acute distress.  Pt found resting comfortably in bed. Pt remains afebrile overnight.  Rt LE is mildly painful and remains swollen/ erythematous.  Pt has no new complaints today.       MEDICATIONS  (STANDING):  furosemide    Tablet 40 milliGRAM(s) Oral daily  rifaximin 550 milliGRAM(s) Oral two times a day  sodium chloride 0.9%. 1000 milliLiter(s) (60 mL/Hr) IV Continuous <Continuous>  spironolactone 25 milliGRAM(s) Oral daily  vancomycin  IVPB 1250 milliGRAM(s) IV Intermittent every 12 hours    MEDICATIONS  (PRN):  traMADol 25 milliGRAM(s) Oral every 8 hours PRN Severe Pain (7 - 10)      __________________________________________________  REVIEW OF SYSTEMS:    CONSTITUTIONAL: No fever  NECK: No pain or stiffness  RESPIRATORY: No cough; No shortness of breath  CARDIOVASCULAR: No chest pain, no palpitations  GASTROINTESTINAL: No pain. No nausea or vomiting; No diarrhea   NEUROLOGICAL: No headache or numbness, no tremors  MUSCULOSKELETAL: No joint pain, no muscle pain; + Mild Rt LE discomfort/ erythema/ swelling    Vital Signs Last 24 Hrs  T(C): 37.1 (21 Feb 2018 05:15), Max: 37.7 (20 Feb 2018 22:47)  T(F): 98.7 (21 Feb 2018 05:15), Max: 99.8 (20 Feb 2018 22:47)  HR: 94 (21 Feb 2018 05:15) (89 - 101)  BP: 123/60 (21 Feb 2018 05:15) (92/35 - 170/72)  RR: 17 (21 Feb 2018 05:15) (16 - 18)  SpO2: 100% (21 Feb 2018 05:15) (98% - 100%)    ________________________________________________  PHYSICAL EXAM:  GENERAL: NAD  HEENT: Normocephalic;  conjunctivae and sclerae clear; moist mucous membranes;   NECK : supple  CHEST/LUNG: Clear to auscultation bilaterally with good air entry   HEART: S1 S2  regular; no murmurs, gallops or rubs  ABDOMEN: Soft, Nontender, Nondistended; Bowel sounds present  EXTREMITIES: no cyanosis; + 2+edema;  mild LE tenderness to palpation/ erythematous, ulcerated, w/ no open lesions. Moderate swelling of LE. Sensation and motor function intact.  Difficult to assess pulses du to swelling.   NERVOUS SYSTEM:  Awake and alert; Oriented  to place, person and time ; no new deficits    _________________________________________________  LABS:                        13.7   10.9  )-----------( 34       ( 20 Feb 2018 06:56 )             42.6     02-20    134<L>  |  101  |  25<H>  ----------------------------<  97  3.8   |  27  |  0.92    Ca    8.6      20 Feb 2018 06:56    TPro  6.6  /  Alb  2.3<L>  /  TBili  3.7<H>  /  DBili  x   /  AST  54<H>  /  ALT  43  /  AlkPhos  110  02-20    PT/INR - ( 20 Feb 2018 06:56 )   PT: 23.6 sec;   INR: 2.13 ratio         PTT - ( 20 Feb 2018 06:56 )  PTT:44.7 sec      RADIOLOGY & ADDITIONAL TESTS:  US Duplex Venous Lower Ext Ltd, Right (02.20.18 @ 12:15)     IMPRESSION:  No evidence of DVT.       US Abdomen Complete (02.20.18 @ 11:47)   IMPRESSION:  Cirrhosis.    Multiple small gallstones.       Imaging Personally Reviewed:  YES    Consultant(s) Notes Reviewed:   YES    Care Discussed with Consultants :     Plan of care was discussed with patient and /or primary care giver; all questions and concerns were addressed and care was aligned with patient's wishes.

## 2018-02-21 NOTE — PROGRESS NOTE ADULT - ASSESSMENT
Patient is a 62/F from home, lives with her daughter, AAO*3, Azerbaijani speaking, with PMHx of liver cirrhosis (on rifaximin, aldactone and laxix), does not know the etiology of the cirrhosis, erythema Multiforme rash in the past, and cellulitis in the same leg comes in with redness, pain and swelling over the right leg since saturday.  Patient is afebrile in the ED, BP on the lower side, latest reading was 92/35, got 1 L NS bolus, however patient is asymptomatic. no leucocytosis on the CBC, has macrocytosis: 102.9, INR is elevated, 2.13, could be 2/2 to liver cirrhosis, lactate is WNL, albumin is 2.3, Plt count is 34  s/p vanc and zosyn in the ED, and morphine 4mg IV*1 for pain  Admitted for RLE cellulitis Patient is a 62/F from home, lives with her daughter, AAO*3, Surinamese speaking, with PMHx of liver cirrhosis (on rifaximin, aldactone and laxix), does not know the etiology of the cirrhosis, erythema Multiforme rash in the past, and cellulitis in the same leg comes in with redness, pain and swelling over the right leg since saturday.  Patient remains afebrile without leukocytosis.  LE remains swollen/ erythematous, and mildly tender.  LE doppler neg for DVT.  Pt will continue Vancomycin per ID recommendation.    Pt also presents with Cirrhosis of unknown etiology.  Awaiting results of hep panel, and will continue rifaximin therapy.

## 2018-02-21 NOTE — PROGRESS NOTE ADULT - PROBLEM SELECTOR PLAN 1
Patient with RLE pain, swelling and redness, subjective fever and chills  although no recorded fever or leucocytosis in CBC  s/p vanc and zosyn in the ED, will manage with Unasyn+clinda and c/w maintenance IVF  Blood cultures are testing  Will get RLE venous doppler to r/o DVT, Wells score for DVT is 3, however patient is already therapeutically anticoagulated, INR is 2.13  ID Dr hussein -Patient with RLE pain, swelling and redness  -Pt reamins afebrile without leukocytosis  -LE doppler neg for AVT  -c/w Vancomycin 1250mg BID  -f/u Vanc trough in AM  -f/u Blood cultures   -ID: Dr hussein

## 2018-02-21 NOTE — ADVANCED PRACTICE NURSE CONSULT - ASSESSMENT
This is a 62yr old female patient admitted for Cellulitis, presenting with R. Lower Leg Cellulitis with scant weeping and edema

## 2018-02-21 NOTE — PROGRESS NOTE ADULT - ATTENDING COMMENTS
Patient seen and examined; Agree with PGY1 A/P above with editing as needed. Discussed with Dr. Hagan  Patient seen with RN Mark silverman     CC: Right leg pain, swelling and redness    Patient is a 62/F from home, lives with her daughter, AAO x 3, Emirati speaking, with PMHx of liver cirrhosis (on rifaximin, aldactone and laxix), does not know the etiology of the cirrhosis, erythema Multiforme rash in the past, and cellulitis in the same leg comes in with redness, pain and swelling over the right leg since 4 days. She was recently treated with Antibiotics and completed treatment.    Patient placed on IV Antibiotics in ED. Patient usually follows up at [facility] for routine medical care. As per Patient she also takes Lactulose at home. denies abdominal pain. No fever, cough, chest pain. Patient may be little disoriented from providing complaints/ history. Patient also follows up with [facility] (possibly Liver transplant)    Vitals: reviewed; stable    P/E:  Neuro: AAO x 3; No gross focal deficits  CVS: S1S2 present; Regular  Resp: BLAE+, No wheeze or Rhonchi  GI: Soft, BS+, Non tender, No organomegaly  EXTr; RLE swollen, erythematous, warm, tender and ulcer dorsal aspect dressing by wound care  LLE: No edema or calf tenderness    Labs: Reviewed; stable    US Duplex Venous Lower Ext Ltd, Right (02.20.18 @ 12:15)     FINDINGS: There is color and spectral flow, compression and augmentation of the common femoral, superficial femoral and popliteal veins.    There is flow in the posterior tibial vein.    IMPRESSION: No evidence of DVT.      D/D:  Right Lower Extremity Cellulitis failed outpatient Rx  Cirrhosis of unclear etiology, compensated  Thrombocytopenia likely due to underlying Cirrhosis  ?? Hepatic Encephalopathy.       Plan:  Continue IV Vanco 1250 mg twice daily; Follow up trough in AM prior to 4th dose. Adjust to keep trough between 15 to 20.  ID consult Dr. Palmer  Please start Lactulose 20 gm now and at bedtime;   Continue Rifaximin, Lasix and Aldactone but will change to Lasix 20 and Aldactone 50 mg daily to maintain electrolyte balance.  Patient needs to have 2-3 BM's daily to prevent Hepatic Encephalopathy  No DVT prophylaxis due to Thrombocytopenia    Discussed with patient via  RN  Discussed with PGY 1 Dr. Hagan

## 2018-02-21 NOTE — ADVANCED PRACTICE NURSE CONSULT - RECOMMEDATIONS
-Clean the R. Lower Leg with normal saline and apply skin prep to the surrounding skin  -Apply Xeroform gauze to the affected areas, cover with an ABD pad, wrap with gauze wrap, and secure with tape Daily PRN  -Encourage the patient to reposition Q 2hrs using wedges or pillows

## 2018-02-22 LAB
ANION GAP SERPL CALC-SCNC: 6 MMOL/L — SIGNIFICANT CHANGE UP (ref 5–17)
BUN SERPL-MCNC: 13 MG/DL — SIGNIFICANT CHANGE UP (ref 7–18)
CALCIUM SERPL-MCNC: 8 MG/DL — LOW (ref 8.4–10.5)
CHLORIDE SERPL-SCNC: 105 MMOL/L — SIGNIFICANT CHANGE UP (ref 96–108)
CO2 SERPL-SCNC: 28 MMOL/L — SIGNIFICANT CHANGE UP (ref 22–31)
CREAT SERPL-MCNC: 0.48 MG/DL — LOW (ref 0.5–1.3)
GLUCOSE SERPL-MCNC: 91 MG/DL — SIGNIFICANT CHANGE UP (ref 70–99)
HCT VFR BLD CALC: 35.4 % — SIGNIFICANT CHANGE UP (ref 34.5–45)
HGB BLD-MCNC: 12 G/DL — SIGNIFICANT CHANGE UP (ref 11.5–15.5)
MCHC RBC-ENTMCNC: 33.9 GM/DL — SIGNIFICANT CHANGE UP (ref 32–36)
MCHC RBC-ENTMCNC: 34.8 PG — HIGH (ref 27–34)
MCV RBC AUTO: 102.6 FL — HIGH (ref 80–100)
PLATELET # BLD AUTO: 31 K/UL — LOW (ref 150–400)
POTASSIUM SERPL-MCNC: 4.1 MMOL/L — SIGNIFICANT CHANGE UP (ref 3.5–5.3)
POTASSIUM SERPL-SCNC: 4.1 MMOL/L — SIGNIFICANT CHANGE UP (ref 3.5–5.3)
RBC # BLD: 3.46 M/UL — LOW (ref 3.8–5.2)
RBC # FLD: 13.7 % — SIGNIFICANT CHANGE UP (ref 10.3–14.5)
SODIUM SERPL-SCNC: 139 MMOL/L — SIGNIFICANT CHANGE UP (ref 135–145)
VANCOMYCIN TROUGH SERPL-MCNC: 10 UG/ML — SIGNIFICANT CHANGE UP (ref 10–20)
WBC # BLD: 4.5 K/UL — SIGNIFICANT CHANGE UP (ref 3.8–10.5)
WBC # FLD AUTO: 4.5 K/UL — SIGNIFICANT CHANGE UP (ref 3.8–10.5)

## 2018-02-22 PROCEDURE — 99233 SBSQ HOSP IP/OBS HIGH 50: CPT | Mod: GC

## 2018-02-22 RX ORDER — VANCOMYCIN HCL 1 G
1500 VIAL (EA) INTRAVENOUS EVERY 12 HOURS
Qty: 0 | Refills: 0 | Status: DISCONTINUED | OUTPATIENT
Start: 2018-02-22 | End: 2018-02-24

## 2018-02-22 RX ORDER — SPIRONOLACTONE 25 MG/1
50 TABLET, FILM COATED ORAL DAILY
Qty: 0 | Refills: 0 | Status: DISCONTINUED | OUTPATIENT
Start: 2018-02-22 | End: 2018-02-27

## 2018-02-22 RX ORDER — ERGOCALCIFEROL 1.25 MG/1
50000 CAPSULE ORAL ONCE
Qty: 0 | Refills: 0 | Status: COMPLETED | OUTPATIENT
Start: 2018-02-22 | End: 2018-02-22

## 2018-02-22 RX ADMIN — Medication 1 TABLET(S): at 08:38

## 2018-02-22 RX ADMIN — Medication 1 TABLET(S): at 11:59

## 2018-02-22 RX ADMIN — Medication 166.67 MILLIGRAM(S): at 06:54

## 2018-02-22 RX ADMIN — SPIRONOLACTONE 25 MILLIGRAM(S): 25 TABLET, FILM COATED ORAL at 06:54

## 2018-02-22 RX ADMIN — Medication 300 MILLIGRAM(S): at 17:09

## 2018-02-22 RX ADMIN — Medication 1 TABLET(S): at 00:59

## 2018-02-22 RX ADMIN — Medication 40 MILLIGRAM(S): at 06:54

## 2018-02-22 RX ADMIN — ERGOCALCIFEROL 50000 UNIT(S): 1.25 CAPSULE ORAL at 08:46

## 2018-02-22 RX ADMIN — Medication 1 TABLET(S): at 17:09

## 2018-02-22 NOTE — DIETITIAN INITIAL EVALUATION ADULT. - NS FNS WEIGHT USED FOR CALC
adjusted/Ht=5'    Adjusted DSK=981 lb   Admission rf=478 lb   BMI=39.1    current ih=377.1 lb   3+ pitting edema

## 2018-02-22 NOTE — PROGRESS NOTE ADULT - SUBJECTIVE AND OBJECTIVE BOX
PGY 1 Note discussed with supervising resident and primary attending    Patient is a 62y old  Female who presents with a chief complaint of cellulitis, r leg (20 Feb 2018 09:31)    INTERVAL HPI/OVERNIGHT EVENTS: No acute events reported overnight.  Today pt presents in no acute distress.  Pt found resting comfortably in bed. Pt remains afebrile overnight.  Rt LE remains mildly painful and remains swollen/ erythematous.  Pt has no new complaints today.     MEDICATIONS  (STANDING):  ergocalciferol 64141 Unit(s) Oral once  furosemide    Tablet 40 milliGRAM(s) Oral daily  lactulose Syrup 20 Gram(s) Oral at bedtime  potassium acid phosphate/sodium acid phosphate tablet (K-PHOS No. 2) 1 Tablet(s) Oral four times a day with meals  rifaximin 550 milliGRAM(s) Oral two times a day  sodium chloride 0.9%. 1000 milliLiter(s) (60 mL/Hr) IV Continuous <Continuous>  spironolactone 25 milliGRAM(s) Oral daily  vancomycin  IVPB 1250 milliGRAM(s) IV Intermittent every 12 hours    MEDICATIONS  (PRN):  traMADol 25 milliGRAM(s) Oral every 8 hours PRN Severe Pain (7 - 10)  __________________________________________________  REVIEW OF SYSTEMS:    CONSTITUTIONAL: No fever  NECK: No pain or stiffness  RESPIRATORY: No cough; No shortness of breath  CARDIOVASCULAR: No chest pain, no palpitations  GASTROINTESTINAL: No pain. No nausea or vomiting; No diarrhea   NEUROLOGICAL: No headache or numbness, no tremors  MUSCULOSKELETAL: No joint pain, no muscle pain; + Mild Rt LE discomfort/ erythema/ swelling    Vital Signs Last 24 Hrs  T(C): 37 (22 Feb 2018 05:15), Max: 37 (22 Feb 2018 05:15)  T(F): 98.6 (22 Feb 2018 05:15), Max: 98.6 (22 Feb 2018 05:15)  HR: 90 (22 Feb 2018 05:15) (87 - 90)  BP: 116/52 (22 Feb 2018 05:15) (115/55 - 116/52)  RR: 16 (22 Feb 2018 05:15) (16 - 16)  SpO2: 100% (22 Feb 2018 05:15) (100% - 100%)    ________________________________________________  PHYSICAL EXAM:  GENERAL: NAD  HEENT: Normocephalic;  conjunctivae and sclerae clear; moist mucous membranes;   NECK : supple  CHEST/LUNG: Clear to auscultation bilaterally with good air entry   HEART: S1 S2  regular; no murmurs, gallops or rubs  ABDOMEN: Soft, Nontender, Nondistended; Bowel sounds present  EXTREMITIES: no cyanosis; + 2+edema;  mild LE tenderness to palpation/ erythematous, ulcerated, w/ no open lesions. Moderate swelling of LE. Sensation and motor function intact.  Difficult to assess pulses du to swelling.   NERVOUS SYSTEM:  Awake and alert; Oriented  to place, person and time ; no new deficits    _________________________________________________  LABS:                                   12.0   4.5   )-----------( 31       ( 22 Feb 2018 06:12 )             35.4     02-22    139  |  105  |  13  ----------------------------<  91  4.1   |  28  |  0.48<L>    Ca    8.0<L>      22 Feb 2018 06:12  Phos  2.0     02-21  Mg     1.5     02-21    TPro  7.1  /  Alb  2.2<L>  /  TBili  2.8<H>  /  DBili  1.1<H>  /  AST  52<H>  /  ALT  37  /  AlkPhos  114  02-21        RADIOLOGY & ADDITIONAL TESTS:  US Duplex Venous Lower Ext Ltd, Right (02.20.18 @ 12:15)     IMPRESSION:  No evidence of DVT.       US Abdomen Complete (02.20.18 @ 11:47)   IMPRESSION:  Cirrhosis.    Multiple small gallstones.       Imaging Personally Reviewed:  YES    Consultant(s) Notes Reviewed:   YES    Care Discussed with Consultants :     Plan of care was discussed with patient and /or primary care giver; all questions and concerns were addressed and care was aligned with patient's wishes.

## 2018-02-22 NOTE — PROGRESS NOTE ADULT - PROBLEM SELECTOR PLAN 2
-Cirrhosis  -hepatitis panel sig for Hepatitis C   -HIV neg  -INR and platelet count improving  -Hyperammonemia w/o focal neurological findings: 72  -c/w rifaximin, aldactone and lasix as tolerated by her BP  -MELD-Na score: 13, indicating 7-10% 30 day mortality  -f/u w/ Gastroenterologist as OP

## 2018-02-22 NOTE — PROGRESS NOTE ADULT - ASSESSMENT
Patient is a 62/F from home, lives with her daughter, AAO*3, Sudanese speaking, with PMHx of liver cirrhosis (on rifaximin, aldactone and laxix), does not know the etiology of the cirrhosis, erythema Multiforme rash in the past, and cellulitis in the same leg comes in with redness, pain and swelling over the right leg since saturday.  Patient remains afebrile without leukocytosis.  LE remains swollen/ erythematous, and mildly tender.  LE doppler neg for DVT.  Pt will continue Vancomycin per ID recommendation.    Pt also presents with Cirrhosis- HepC positive.    Will continue rifaximin therapy.

## 2018-02-22 NOTE — PROGRESS NOTE ADULT - PROBLEM SELECTOR PLAN 1
-Patient with RLE pain, swelling and redness  -Pt remains afebrile without leukocytosis  -LE doppler neg for AVT  -c/w Vancomycin 1250mg BID  -Vanc trough 10 this AM  -f/u Blood cultures   -ID: Dr hussein

## 2018-02-22 NOTE — PROGRESS NOTE ADULT - ATTENDING COMMENTS
Patient seen and examined; Agree with PGY1 A/P above with editing as needed. Discussed with Dr. Hagan  Patient seen with  PCA    CC: Right leg pain, swelling and redness    Patient is a 62/F from home, lives with her daughter, AAO x 3, Brazilian speaking, with PMHx of liver cirrhosis (on rifaximin, aldactone and lasix), does not know the etiology of the cirrhosis, erythema Multiforme rash in the past, and cellulitis in the same leg comes in with redness, pain and swelling over the right leg since 4 days. She was recently treated with Antibiotics and completed treatment.    Patient still has pain and swelling in right leg without involvement of foot. some superficial blisters on top of dorsal aspect of leg. had a bowel movement yesterday.     Vitals: reviewed; stable    P/E:  Neuro: AAO x 3; No gross focal deficits  CVS: S1S2 present; Regular  Resp: BLAE+, No wheeze or Rhonchi  GI: Soft, BS+, Non tender, No organomegaly  EXTR; RLE swollen, erythematous, warm, tender and slall blisters dorsal aspect.   LLE: No edema or calf tenderness    Labs: Reviewed; stable    D/D:  Right Lower Extremity Cellulitis failed outpatient Rx  Cirrhosis likely due to Hep. C compensated  Thrombocytopenia likely due to underlying Cirrhosis  ?? Hepatic Encephalopathy improved      Plan:  Increased Vanco 1500 mg twice daily as trough was low; Follow up trough tomorrow evening; target 15-20;   ID eval Dr. Palmer appreciated  Continue Lactulose 20 gm at bedtime;   Continue Rifaximin, Lasix and Aldactone but will change to Lasix 20 and Aldactone 50 mg daily to maintain electrolyte balance.  Patient needs to have 2-3 BM's daily to prevent Hepatic Encephalopathy  No DVT prophylaxis due to Thrombocytopenia    Discussed with patient via    Discussed with PGY 1 Dr. Hagan Patient seen and examined; Agree with PGY1 A/P above with editing as needed. Discussed with Dr. Hagan  Patient seen with  PCA    CC: Right leg pain, swelling and redness    Patient is a 62/F from home, lives with her daughter, AAO x 3, Serbian speaking, with PMHx of liver cirrhosis (on rifaximin, aldactone and lasix), does not know the etiology of the cirrhosis, erythema Multiforme rash in the past, and cellulitis in the same leg comes in with redness, pain and swelling over the right leg since 4 days. She was recently treated with Antibiotics and completed treatment.    Patient still has pain and swelling in right leg without involvement of foot. some superficial blisters on top of dorsal aspect of leg. had a bowel movement yesterday.     Vitals: reviewed; stable    P/E:  Neuro: AAO x 3; No gross focal deficits  CVS: S1S2 present; Regular  Resp: BLAE+, No wheeze or Rhonchi  GI: Soft, BS+, Non tender, No organomegaly  EXTR; RLE swollen, erythematous, warm, tender and slall blisters dorsal aspect.   LLE: No edema or calf tenderness    Labs: Reviewed; stable    D/D:  Right Lower Extremity Cellulitis failed outpatient Rx  Cirrhosis likely due to Hep. C compensated  Thrombocytopenia likely due to underlying Cirrhosis  ?? Hepatic Encephalopathy improved      Plan:  Increased Vanco 1500 mg twice daily as trough was low; Follow up trough tomorrow evening; target 15-20;   ID eval Dr. Palmer appreciated  Continue Lactulose 20 gm at bedtime;   Continue Rifaximin, Lasix and Aldactone but will change to Lasix 20 and Aldactone 50 mg daily to maintain electrolyte balance.  Patient needs to have 2-3 BM's daily to prevent Hepatic Encephalopathy  No DVT prophylaxis due to Thrombocytopenia  patient and RN advised to keep right leg elevated    Discussed with patient via    Discussed with PGY 1 Dr. Hagan

## 2018-02-22 NOTE — DIETITIAN INITIAL EVALUATION ADULT. - OTHER INFO
nutrition consult requested for cellulitis, wound; lives home PTA; skin intact, RLE cellulitis noted; denied GI distress, chewing or swallowing problem at present, no specific food choices reported; recently unintended wt gain from usual wt of  180 to 190 lb to admission 199 lb, current ia=599.1 lb, 3+ pitting edema

## 2018-02-22 NOTE — DIETITIAN INITIAL EVALUATION ADULT. - SOURCE
other (specify)/chart reviewed. Pt contacted via [employer] phone  ID #[id number], able to communicate well, but appears not feeling well, pain from RLE at times ( RN aware)/patient

## 2018-02-23 DIAGNOSIS — D69.6 THROMBOCYTOPENIA, UNSPECIFIED: ICD-10-CM

## 2018-02-23 LAB
HCV RNA FLD QL NAA+PROBE: SIGNIFICANT CHANGE UP
HCV RNA SPEC QL PROBE+SIG AMP: SIGNIFICANT CHANGE UP
VANCOMYCIN TROUGH SERPL-MCNC: 13.3 UG/ML — SIGNIFICANT CHANGE UP (ref 10–20)

## 2018-02-23 PROCEDURE — 99233 SBSQ HOSP IP/OBS HIGH 50: CPT | Mod: GC

## 2018-02-23 RX ORDER — ERGOCALCIFEROL 1.25 MG/1
50000 CAPSULE ORAL
Qty: 0 | Refills: 0 | Status: DISCONTINUED | OUTPATIENT
Start: 2018-02-23 | End: 2018-02-23

## 2018-02-23 RX ORDER — SALICYLIC ACID 0.5 %
1 CLEANSER (GRAM) TOPICAL THREE TIMES A DAY
Qty: 0 | Refills: 0 | Status: DISCONTINUED | OUTPATIENT
Start: 2018-02-23 | End: 2018-02-27

## 2018-02-23 RX ADMIN — Medication 40 MILLIGRAM(S): at 05:17

## 2018-02-23 RX ADMIN — SPIRONOLACTONE 50 MILLIGRAM(S): 25 TABLET, FILM COATED ORAL at 05:16

## 2018-02-23 RX ADMIN — Medication 300 MILLIGRAM(S): at 18:16

## 2018-02-23 RX ADMIN — Medication 1 TABLET(S): at 00:03

## 2018-02-23 RX ADMIN — Medication 1 TABLET(S): at 11:48

## 2018-02-23 RX ADMIN — LACTULOSE 20 GRAM(S): 10 SOLUTION ORAL at 00:03

## 2018-02-23 RX ADMIN — Medication 1 APPLICATION(S): at 23:25

## 2018-02-23 RX ADMIN — Medication 1 APPLICATION(S): at 18:17

## 2018-02-23 RX ADMIN — LACTULOSE 20 GRAM(S): 10 SOLUTION ORAL at 23:25

## 2018-02-23 RX ADMIN — TRAMADOL HYDROCHLORIDE 25 MILLIGRAM(S): 50 TABLET ORAL at 06:30

## 2018-02-23 RX ADMIN — TRAMADOL HYDROCHLORIDE 25 MILLIGRAM(S): 50 TABLET ORAL at 05:48

## 2018-02-23 RX ADMIN — Medication 300 MILLIGRAM(S): at 05:17

## 2018-02-23 NOTE — PROGRESS NOTE ADULT - PROBLEM SELECTOR PLAN 3
-Secondary to Liver CIrrohsis  -Monitor platelet count  -Hold DVT prophy -Secondary to Liver Cirrhosis  -Monitor platelet count  -Hold DVT prophy

## 2018-02-23 NOTE — PROGRESS NOTE ADULT - ATTENDING COMMENTS
Patient seen and examined; Agree with PGY1 A/P above with editing as needed. Discussed with Dr. Hagan  Patient seen after discussing with patient mauro  YEVGENIY Bobby    CC: Right leg pain, swelling and redness    Patient is a 62/F from home, lives with her daughter, ALISHAO x 3, Belarusian speaking, with PMHx of liver cirrhosis (on rifaximin, aldactone and lasix), does not know the etiology of the cirrhosis, erythema Multiforme rash in the past, and cellulitis in the same leg comes in with redness, pain and swelling over the right leg since 4 days. She was recently treated with Antibiotics and completed treatment.    Patient still has pain and swelling in right leg without involvement of foot. some superficial blisters on top of dorsal aspect of leg but little improved from yesterday. had multiple bowel movements.     Vital Signs Last 24 Hrs: Stable  T(C): 36.8 (23 Feb 2018 14:45), Max: 36.9 (23 Feb 2018 00:13)  T(F): 98.2 (23 Feb 2018 14:45), Max: 98.4 (23 Feb 2018 00:13)  HR: 87 (23 Feb 2018 14:45) (87 - 98)  BP: 111/53 (23 Feb 2018 14:45) (111/53 - 144/55)  RR: 17 (23 Feb 2018 14:45) (16 - 18)  SpO2: 100% (23 Feb 2018 14:45) (100% - 100%)    P/E:  Neuro: AAO x 3; No gross focal deficits  CVS: S1S2 present; Regular  Resp: BLAE+, No wheeze or Rhonchi  GI: Soft, BS+, Non tender, No organomegaly  EXTR; RLE swollen, erythematous, warm, tender and slall blisters dorsal aspect.   LLE: No edema or calf tenderness    Labs: Reviewed; stable    D/D:  Right Lower Extremity Cellulitis failed outpatient Rx slowly resolving  Cirrhosis likely due to Hep. C compensated  Thrombocytopenia likely due to underlying Cirrhosis  ?? Hepatic Encephalopathy improved      Plan:  Continue Vanco 1500 mg twice daily as trough was low; Repeat trough this evening; target 15-20;   ID follow up  Continue Lactulose 20 gm at bedtime;   Continue Rifaximin, Lasix and Aldactone but will change to Lasix 20 and Aldactone 50 mg daily to maintain electrolyte balance.  Titrate Lactulose to have 2-3 BM's daily to prevent Hepatic Encephalopathy  No DVT prophylaxis due to Thrombocytopenia  Patient and RN advised to continue to keep right leg elevated    Discussed with patient via  YEVGENIY Clemente  Spoke with Daughter Adelina over the phone [phone]; Updated findings, clinical status and discharge planning early next week if improved.   Discussed with PGY 1 Dr. Hagan

## 2018-02-23 NOTE — PROGRESS NOTE ADULT - PROBLEM SELECTOR PLAN 2
-Cirrhosis  -hepatitis panel sig for Hepatitis C   -HIV neg  -INR and platelet count improving  -f/u Hep C  viral load  -c/w rifaximin, aldactone and lasix as tolerated by her BP  -MELD-Na score: 13, indicating 7-10% 30 day mortality  -f/u w/ Gastroenterologist as OP

## 2018-02-23 NOTE — PROGRESS NOTE ADULT - ASSESSMENT
Patient is a 62/F from home, lives with her daughter, AAO*3, Portuguese speaking, with PMHx of liver cirrhosis (on rifaximin, aldactone and laxix), does not know the etiology of the cirrhosis, erythema Multiforme rash in the past, and cellulitis in the same leg comes in with redness, pain and swelling over the right leg since saturday.  Patient remains afebrile without leukocytosis.  LE remains swollen/ erythematous, and mildly tender.  LE doppler neg for DVT.  Pt will continue Vancomycin per ID recommendation- increased to 1500 daily  Pt also presents with Cirrhosis- HepC positive.    Will continue rifaximin, aldactone therapy. Patient is a 62/F from home, lives with her daughter, AAO*3, Turkmen speaking, with PMHx of liver cirrhosis (on rifaximin, aldactone and laxix), does not know the etiology of the cirrhosis, erythema Multiforme rash in the past, and cellulitis in the same leg comes in with redness, pain and swelling over the right leg since saturday.  Patient remains afebrile without leukocytosis.    LE remains swollen/ erythematous, and mildly tender.  LE doppler neg for DVT.  Pt will continue Vancomycin per ID recommendation- increased to 1500 daily  Pt also presents with Cirrhosis- HepC positive.    Will continue rifaximin, aldactone therapy.

## 2018-02-23 NOTE — PROGRESS NOTE ADULT - SUBJECTIVE AND OBJECTIVE BOX
PGY 1 Note discussed with supervising resident and primary attending    Patient is a 62y old  Female who presents with a chief complaint of cellulitis, r leg (20 Feb 2018 09:31)    INTERVAL HPI/OVERNIGHT EVENTS: No acute events reported overnight.  Today pt presents in no acute distress.  Pt found resting comfortably in bed. Pt remains afebrile overnight.  Rt LE remains mildly painful and remains swollen/ erythematous.  Pt has no new complaints today.     MEDICATIONS  (STANDING):  furosemide    Tablet 40 milliGRAM(s) Oral daily  lactulose Syrup 20 Gram(s) Oral at bedtime  potassium acid phosphate/sodium acid phosphate tablet (K-PHOS No. 2) 1 Tablet(s) Oral four times a day with meals  rifaximin 550 milliGRAM(s) Oral two times a day  sodium chloride 0.9%. 1000 milliLiter(s) (60 mL/Hr) IV Continuous <Continuous>  spironolactone 50 milliGRAM(s) Oral daily  vancomycin  IVPB 1500 milliGRAM(s) IV Intermittent every 12 hours    MEDICATIONS  (PRN):  traMADol 25 milliGRAM(s) Oral every 8 hours PRN Severe Pain (7 - 10)    __________________________________________________  REVIEW OF SYSTEMS:    CONSTITUTIONAL: No fever  NECK: No pain or stiffness  RESPIRATORY: No cough; No shortness of breath  CARDIOVASCULAR: No chest pain, no palpitations  GASTROINTESTINAL: No pain. No nausea or vomiting; No diarrhea   NEUROLOGICAL: No headache or numbness, no tremors  MUSCULOSKELETAL: No joint pain, no muscle pain; + Mild Rt LE discomfort/ erythema/ swelling    Vital Signs Last 24 Hrs  T(C): 36.9 (23 Feb 2018 05:11), Max: 36.9 (23 Feb 2018 00:13)  T(F): 98.4 (23 Feb 2018 05:11), Max: 98.4 (23 Feb 2018 00:13)  HR: 93 (23 Feb 2018 05:11) (89 - 98)  BP: 144/55 (23 Feb 2018 05:11) (119/68 - 144/55)  RR: 18 (23 Feb 2018 05:11) (16 - 18)  SpO2: 100% (23 Feb 2018 05:11) (100% - 100%)    ________________________________________________  PHYSICAL EXAM:  GENERAL: NAD  HEENT: Normocephalic;  conjunctivae and sclerae clear; moist mucous membranes;   NECK : supple  CHEST/LUNG: Clear to auscultation bilaterally with good air entry   HEART: S1 S2  regular; no murmurs, gallops or rubs  ABDOMEN: Soft, Nontender, Nondistended; Bowel sounds present  EXTREMITIES: no cyanosis; + 2+edema;  mild LE tenderness to palpation/ erythematous, ulcerated, w/ no open lesions. Moderate swelling of LE. Sensation and motor function intact.  Difficult to assess pulses du to swelling.   NERVOUS SYSTEM:  Awake and alert; Oriented  to place, person and time ; no new deficits    _________________________________________________  LABS:                         12.0   4.5   )-----------( 31       ( 22 Feb 2018 06:12 )             35.4     02-22    139  |  105  |  13  ----------------------------<  91  4.1   |  28  |  0.48<L>    Ca    8.0<L>      22 Feb 2018 06:12      RADIOLOGY & ADDITIONAL TESTS:  US Duplex Venous Lower Ext Ltd, Right (02.20.18 @ 12:15)     IMPRESSION:  No evidence of DVT.       US Abdomen Complete (02.20.18 @ 11:47)   IMPRESSION:  Cirrhosis.    Multiple small gallstones.       Imaging Personally Reviewed:  YES    Consultant(s) Notes Reviewed:   YES    Care Discussed with Consultants :     Plan of care was discussed with patient and /or primary care giver; all questions and concerns were addressed and care was aligned with patient's wishes. PGY 1 Note discussed with supervising resident and primary attending    Patient is a 62y old  Female who presents with a chief complaint of cellulitis, r leg (20 Feb 2018 09:31)    INTERVAL HPI/OVERNIGHT EVENTS: No acute events reported overnight.  Today pt presents in no acute distress.  Pt found resting comfortably in bed. Pt remains afebrile overnight.  Rt LE remains mildly painful and remains swollen/ erythematous.  Pt has no new complaints today.     MEDICATIONS  (STANDING):  furosemide    Tablet 40 milliGRAM(s) Oral daily  lactulose Syrup 20 Gram(s) Oral at bedtime  potassium acid phosphate/sodium acid phosphate tablet (K-PHOS No. 2) 1 Tablet(s) Oral four times a day with meals  rifaximin 550 milliGRAM(s) Oral two times a day  sodium chloride 0.9%. 1000 milliLiter(s) (60 mL/Hr) IV Continuous <Continuous>  spironolactone 50 milliGRAM(s) Oral daily  vancomycin  IVPB 1500 milliGRAM(s) IV Intermittent every 12 hours    MEDICATIONS  (PRN):  traMADol 25 milliGRAM(s) Oral every 8 hours PRN Severe Pain (7 - 10)    __________________________________________________  REVIEW OF SYSTEMS:    CONSTITUTIONAL: No fever  NECK: No pain or stiffness  RESPIRATORY: No cough; No shortness of breath  CARDIOVASCULAR: No chest pain, no palpitations  GASTROINTESTINAL: No pain. No nausea or vomiting; No diarrhea   NEUROLOGICAL: No headache or numbness, no tremors  MUSCULOSKELETAL: No joint pain, no muscle pain; + Mild Rt LE discomfort/ erythema/ swelling    Vital Signs Last 24 Hrs  T(C): 36.9 (23 Feb 2018 05:11), Max: 36.9 (23 Feb 2018 00:13)  T(F): 98.4 (23 Feb 2018 05:11), Max: 98.4 (23 Feb 2018 00:13)  HR: 93 (23 Feb 2018 05:11) (89 - 98)  BP: 144/55 (23 Feb 2018 05:11) (119/68 - 144/55)  RR: 18 (23 Feb 2018 05:11) (16 - 18)  SpO2: 100% (23 Feb 2018 05:11) (100% - 100%)    ________________________________________________  PHYSICAL EXAM:  GENERAL: NAD  HEENT: Normocephalic;  conjunctivae and sclerae clear; moist mucous membranes;   NECK : supple  CHEST/LUNG: Clear to auscultation bilaterally with good air entry   HEART: S1 S2  regular; no murmurs, gallops or rubs  ABDOMEN: Soft, Nontender, Nondistended; Bowel sounds present  EXTREMITIES: no cyanosis; + 2+edema;  mild LE tenderness to palpation/ erythematous, ulcerated, w/ no open lesions. Moderate swelling of LE. Sensation and motor function intact.  Difficult to assess pulses du to swelling.   NERVOUS SYSTEM:  Awake and alert; Oriented  to place, person and time ; no new deficits    _________________________________________________  LABS:  Pt refused labs today    RADIOLOGY & ADDITIONAL TESTS:  US Duplex Venous Lower Ext Ltd, Right (02.20.18 @ 12:15)     IMPRESSION:  No evidence of DVT.       US Abdomen Complete (02.20.18 @ 11:47)   IMPRESSION:  Cirrhosis.    Multiple small gallstones.       Imaging Personally Reviewed:  YES    Consultant(s) Notes Reviewed:   YES    Care Discussed with Consultants :     Plan of care was discussed with patient and /or primary care giver; all questions and concerns were addressed and care was aligned with patient's wishes.

## 2018-02-23 NOTE — PROGRESS NOTE ADULT - PROBLEM SELECTOR PLAN 1
-Patient with RLE pain, swelling and redness  -Pt remains afebrile without leukocytosis  -LE doppler neg for AVT  -c/w Vancomycin 1500mg BID  -Vanc trough this evening before evening dose  -f/u Blood cultures- NGTD  -ID: Dr hussein -Patient with RLE pain, swelling and redness  -Pt remains afebrile without leukocytosis  -c/w Vancomycin 1500mg BID  -Vanc trough this evening before evening dose  -f/u Blood cultures- NGTD  -ID: Dr hussein

## 2018-02-24 ENCOUNTER — TRANSCRIPTION ENCOUNTER (OUTPATIENT)
Age: 63
End: 2018-02-24

## 2018-02-24 LAB
ANION GAP SERPL CALC-SCNC: 6 MMOL/L — SIGNIFICANT CHANGE UP (ref 5–17)
BUN SERPL-MCNC: 14 MG/DL — SIGNIFICANT CHANGE UP (ref 7–18)
CALCIUM SERPL-MCNC: 8.2 MG/DL — LOW (ref 8.4–10.5)
CHLORIDE SERPL-SCNC: 104 MMOL/L — SIGNIFICANT CHANGE UP (ref 96–108)
CO2 SERPL-SCNC: 28 MMOL/L — SIGNIFICANT CHANGE UP (ref 22–31)
CREAT SERPL-MCNC: 0.55 MG/DL — SIGNIFICANT CHANGE UP (ref 0.5–1.3)
GLUCOSE SERPL-MCNC: 80 MG/DL — SIGNIFICANT CHANGE UP (ref 70–99)
HCT VFR BLD CALC: 37.9 % — SIGNIFICANT CHANGE UP (ref 34.5–45)
HGB BLD-MCNC: 12.6 G/DL — SIGNIFICANT CHANGE UP (ref 11.5–15.5)
MCHC RBC-ENTMCNC: 33.3 GM/DL — SIGNIFICANT CHANGE UP (ref 32–36)
MCHC RBC-ENTMCNC: 34.4 PG — HIGH (ref 27–34)
MCV RBC AUTO: 103.4 FL — HIGH (ref 80–100)
PLATELET # BLD AUTO: 50 K/UL — LOW (ref 150–400)
POTASSIUM SERPL-MCNC: 3.6 MMOL/L — SIGNIFICANT CHANGE UP (ref 3.5–5.3)
POTASSIUM SERPL-SCNC: 3.6 MMOL/L — SIGNIFICANT CHANGE UP (ref 3.5–5.3)
RBC # BLD: 3.66 M/UL — LOW (ref 3.8–5.2)
RBC # FLD: 13.8 % — SIGNIFICANT CHANGE UP (ref 10.3–14.5)
SODIUM SERPL-SCNC: 138 MMOL/L — SIGNIFICANT CHANGE UP (ref 135–145)
WBC # BLD: 4.5 K/UL — SIGNIFICANT CHANGE UP (ref 3.8–10.5)
WBC # FLD AUTO: 4.5 K/UL — SIGNIFICANT CHANGE UP (ref 3.8–10.5)

## 2018-02-24 PROCEDURE — 99233 SBSQ HOSP IP/OBS HIGH 50: CPT | Mod: GC

## 2018-02-24 RX ORDER — VANCOMYCIN HCL 1 G
1500 VIAL (EA) INTRAVENOUS EVERY 12 HOURS
Qty: 0 | Refills: 0 | Status: DISCONTINUED | OUTPATIENT
Start: 2018-02-24 | End: 2018-02-25

## 2018-02-24 RX ADMIN — Medication 300 MILLIGRAM(S): at 18:22

## 2018-02-24 RX ADMIN — SPIRONOLACTONE 50 MILLIGRAM(S): 25 TABLET, FILM COATED ORAL at 06:32

## 2018-02-24 RX ADMIN — TRAMADOL HYDROCHLORIDE 25 MILLIGRAM(S): 50 TABLET ORAL at 21:48

## 2018-02-24 RX ADMIN — LACTULOSE 20 GRAM(S): 10 SOLUTION ORAL at 21:48

## 2018-02-24 RX ADMIN — Medication 300 MILLIGRAM(S): at 06:32

## 2018-02-24 RX ADMIN — TRAMADOL HYDROCHLORIDE 25 MILLIGRAM(S): 50 TABLET ORAL at 22:00

## 2018-02-24 RX ADMIN — Medication 1 APPLICATION(S): at 06:32

## 2018-02-24 RX ADMIN — Medication 40 MILLIGRAM(S): at 06:32

## 2018-02-24 NOTE — PROGRESS NOTE ADULT - ASSESSMENT
right leg cellulitis - improving very slowly    plan - cont vanco 1500mgs iv q12hrs  hopefully will improve next 2-3 days so we can send home on po abxs

## 2018-02-24 NOTE — PROGRESS NOTE ADULT - SUBJECTIVE AND OBJECTIVE BOX
62y Female    Meds:  rifaximin 550 milliGRAM(s) Oral two times a day  vancomycin  IVPB 1500 milliGRAM(s) IV Intermittent every 12 hours    Allergies    No Known Allergies    Intolerances        VITALS:  Vital Signs Last 24 Hrs  T(C): 36.8 (24 Feb 2018 14:26), Max: 37.1 (23 Feb 2018 21:20)  T(F): 98.2 (24 Feb 2018 14:26), Max: 98.7 (23 Feb 2018 21:20)  HR: 90 (24 Feb 2018 14:26) (78 - 91)  BP: 122/57 (24 Feb 2018 14:26) (113/73 - 125/61)  BP(mean): --  RR: 16 (24 Feb 2018 14:26) (16 - 17)  SpO2: 100% (24 Feb 2018 14:26) (99% - 100%)    LABS/DIAGNOSTIC TESTS:                          12.6   4.5   )-----------( 50       ( 24 Feb 2018 06:03 )             37.9         02-24    138  |  104  |  14  ----------------------------<  80  3.6   |  28  |  0.55    Ca    8.2<L>      24 Feb 2018 06:03            CULTURES: .Blood Blood-Peripheral  02-20 @ 09:16   No growth to date.  --  --            RADIOLOGY:      ROS:  [  ] UNABLE TO ELICIT 62y Female who is doing better but still has significant right leg swelling and warmth and still has some pain. No fevers or chills, no diarrhea. Her vanco was increased to 1500mgs iv q12 hrs as the trough was low still.    Meds:  rifaximin 550 milliGRAM(s) Oral two times a day  vancomycin  IVPB 1500 milliGRAM(s) IV Intermittent every 12 hours    Allergies    No Known Allergies    Intolerances        VITALS:  Vital Signs Last 24 Hrs  T(C): 36.8 (24 Feb 2018 14:26), Max: 37.1 (23 Feb 2018 21:20)  T(F): 98.2 (24 Feb 2018 14:26), Max: 98.7 (23 Feb 2018 21:20)  HR: 90 (24 Feb 2018 14:26) (78 - 91)  BP: 122/57 (24 Feb 2018 14:26) (113/73 - 125/61)  BP(mean): --  RR: 16 (24 Feb 2018 14:26) (16 - 17)  SpO2: 100% (24 Feb 2018 14:26) (99% - 100%)    LABS/DIAGNOSTIC TESTS:                          12.6   4.5   )-----------( 50       ( 24 Feb 2018 06:03 )             37.9         02-24    138  |  104  |  14  ----------------------------<  80  3.6   |  28  |  0.55    Ca    8.2<L>      24 Feb 2018 06:03            CULTURES: .Blood Blood-Peripheral  02-20 @ 09:16   No growth to date.  --  --            RADIOLOGY:      ROS:  [  ] UNABLE TO ELICIT

## 2018-02-24 NOTE — PROGRESS NOTE ADULT - SUBJECTIVE AND OBJECTIVE BOX
MEDICAL ATTENDING NOTE    Patient is a 62y old  Female who presents with a chief complaint of cellulitis, r leg (20 Feb 2018 09:31)      INTERVAL HPI/OVERNIGHT EVENTS: no new complaints    MEDICATIONS  (STANDING):  furosemide    Tablet 40 milliGRAM(s) Oral daily  lactulose Syrup 20 Gram(s) Oral at bedtime  rifaximin 550 milliGRAM(s) Oral two times a day  sodium chloride 0.9%. 1000 milliLiter(s) (60 mL/Hr) IV Continuous <Continuous>  spironolactone 50 milliGRAM(s) Oral daily  vancomycin  IVPB 1500 milliGRAM(s) IV Intermittent every 12 hours  vitamin A &amp; D Ointment 1 Application(s) Topical three times a day    MEDICATIONS  (PRN):  traMADol 25 milliGRAM(s) Oral every 8 hours PRN Severe Pain (7 - 10)    __________________________________________________  REVIEW OF SYSTEMS:    CONSTITUTIONAL: No fever,   EYES: no acute visual disturbances  RESPIRATORY: No cough; No shortness of breath  CARDIOVASCULAR: No chest pain, no palpitations  GASTROINTESTINAL: No abd. pain. No nausea or vomiting; No diarrhea   NEUROLOGICAL: No headache   MUSCULOSKELETAL: c/o pain in the distal aspect of the right    ALL OTHER  ROS negative        Vital Signs Last 24 Hrs  T(C): 36.8 (24 Feb 2018 14:26), Max: 37.1 (23 Feb 2018 21:20)  T(F): 98.2 (24 Feb 2018 14:26), Max: 98.7 (23 Feb 2018 21:20)  HR: 90 (24 Feb 2018 14:26) (78 - 91)  BP: 122/57 (24 Feb 2018 14:26) (113/73 - 125/61)  BP(mean): --  RR: 16 (24 Feb 2018 14:26) (16 - 17)  SpO2: 100% (24 Feb 2018 14:26) (99% - 100%)    ________________________________________________  PHYSICAL EXAM:  GENERAL: NAD  HEENT: Normocephalic;  conjunctivae and sclerae clear; moist mucous membranes;   NECK : supple  CHEST/LUNG: Clear to auscultation ; no wheezing  HEART: S1 S2  regular;   ABDOMEN: Soft, Nontender, Nondistended; Bowel sounds present  EXTREMITIES: no cyanosis; no edema; distal 3rd right leg tenderness  SKIN: warm and dry; hyperpigmented discoloration on both legs ;   NERVOUS SYSTEM:  Awake and alert; Oriented  to place, person and time ; no new deficits    _________________________________________________  LABS:                        12.6   4.5   )-----------( 50       ( 24 Feb 2018 06:03 )             37.9     02-24    138  |  104  |  14  ----------------------------<  80  3.6   |  28  |  0.55    Ca    8.2<L>      24 Feb 2018 06:03            Plan of care was discussed with patient ; all questions and concerns were addressed and care was aligned with patient's wishes.

## 2018-02-24 NOTE — PROGRESS NOTE ADULT - PROBLEM SELECTOR PLAN 3
no bleeding  platelet stable and rising  monitor chronic thrombocytopenia due to liver disease; no bleeding  platelet stable and rising  monitor

## 2018-02-24 NOTE — PROGRESS NOTE ADULT - PROBLEM SELECTOR PLAN 1
still with significant pain in the right leg posteriorly;  Pain control  continue antibiotics  CT LE to r/o abscess still with significant pain in the right leg posteriorly;  Pain control  continue antibiotics; monitor vancomycin level for toxicity  CT LE to r/o abscess if persistent pain in next 24 hrs.  ID follow up

## 2018-02-24 NOTE — PROGRESS NOTE ADULT - PROBLEM SELECTOR PLAN 4
improve score is low however patient not as mobile due to pain.  Chemical prophylaxis held due to thrombocytopenia  Venodyne boots while in bed

## 2018-02-24 NOTE — PROGRESS NOTE ADULT - ASSESSMENT
Patient is a 62/F from home, lives with her daughter, AAO*3, Ugandan speaking, with PMHx of liver cirrhosis (on rifaximin, aldactone and laxix), does not know the etiology of the cirrhosis, erythema Multiforme rash in the past, and cellulitis in the same leg comes in with redness, pain and swelling over the right leg admitted for cellulitis

## 2018-02-25 LAB
ANION GAP SERPL CALC-SCNC: 6 MMOL/L — SIGNIFICANT CHANGE UP (ref 5–17)
BUN SERPL-MCNC: 12 MG/DL — SIGNIFICANT CHANGE UP (ref 7–18)
CALCIUM SERPL-MCNC: 7.9 MG/DL — LOW (ref 8.4–10.5)
CHLORIDE SERPL-SCNC: 105 MMOL/L — SIGNIFICANT CHANGE UP (ref 96–108)
CO2 SERPL-SCNC: 28 MMOL/L — SIGNIFICANT CHANGE UP (ref 22–31)
CREAT SERPL-MCNC: 0.47 MG/DL — LOW (ref 0.5–1.3)
CULTURE RESULTS: SIGNIFICANT CHANGE UP
CULTURE RESULTS: SIGNIFICANT CHANGE UP
GLUCOSE SERPL-MCNC: 83 MG/DL — SIGNIFICANT CHANGE UP (ref 70–99)
HCT VFR BLD CALC: 37.4 % — SIGNIFICANT CHANGE UP (ref 34.5–45)
HGB BLD-MCNC: 12.4 G/DL — SIGNIFICANT CHANGE UP (ref 11.5–15.5)
MCHC RBC-ENTMCNC: 33.3 GM/DL — SIGNIFICANT CHANGE UP (ref 32–36)
MCHC RBC-ENTMCNC: 34 PG — SIGNIFICANT CHANGE UP (ref 27–34)
MCV RBC AUTO: 102 FL — HIGH (ref 80–100)
PLATELET # BLD AUTO: 52 K/UL — LOW (ref 150–400)
POTASSIUM SERPL-MCNC: 3.8 MMOL/L — SIGNIFICANT CHANGE UP (ref 3.5–5.3)
POTASSIUM SERPL-SCNC: 3.8 MMOL/L — SIGNIFICANT CHANGE UP (ref 3.5–5.3)
RBC # BLD: 3.67 M/UL — LOW (ref 3.8–5.2)
RBC # FLD: 13.5 % — SIGNIFICANT CHANGE UP (ref 10.3–14.5)
SODIUM SERPL-SCNC: 139 MMOL/L — SIGNIFICANT CHANGE UP (ref 135–145)
SPECIMEN SOURCE: SIGNIFICANT CHANGE UP
SPECIMEN SOURCE: SIGNIFICANT CHANGE UP
VANCOMYCIN TROUGH SERPL-MCNC: 12.9 UG/ML — SIGNIFICANT CHANGE UP (ref 10–20)
WBC # BLD: 5.2 K/UL — SIGNIFICANT CHANGE UP (ref 3.8–10.5)
WBC # FLD AUTO: 5.2 K/UL — SIGNIFICANT CHANGE UP (ref 3.8–10.5)

## 2018-02-25 PROCEDURE — 99233 SBSQ HOSP IP/OBS HIGH 50: CPT

## 2018-02-25 RX ORDER — CHOLECALCIFEROL (VITAMIN D3) 125 MCG
1000 CAPSULE ORAL DAILY
Qty: 0 | Refills: 0 | Status: DISCONTINUED | OUTPATIENT
Start: 2018-02-25 | End: 2018-02-27

## 2018-02-25 RX ORDER — VANCOMYCIN HCL 1 G
1750 VIAL (EA) INTRAVENOUS EVERY 12 HOURS
Qty: 0 | Refills: 0 | Status: DISCONTINUED | OUTPATIENT
Start: 2018-02-25 | End: 2018-02-27

## 2018-02-25 RX ORDER — SPIRONOLACTONE 25 MG/1
1 TABLET, FILM COATED ORAL
Qty: 0 | Refills: 0 | COMMUNITY

## 2018-02-25 RX ORDER — SPIRONOLACTONE 25 MG/1
2 TABLET, FILM COATED ORAL
Qty: 0 | Refills: 0 | COMMUNITY
Start: 2018-02-25

## 2018-02-25 RX ADMIN — Medication 300 MILLIGRAM(S): at 17:50

## 2018-02-25 RX ADMIN — Medication 1 APPLICATION(S): at 13:39

## 2018-02-25 RX ADMIN — Medication 1000 UNIT(S): at 17:50

## 2018-02-25 RX ADMIN — Medication 300 MILLIGRAM(S): at 05:18

## 2018-02-25 RX ADMIN — Medication 1 APPLICATION(S): at 21:37

## 2018-02-25 RX ADMIN — LACTULOSE 20 GRAM(S): 10 SOLUTION ORAL at 21:37

## 2018-02-25 RX ADMIN — Medication 40 MILLIGRAM(S): at 05:18

## 2018-02-25 RX ADMIN — SPIRONOLACTONE 50 MILLIGRAM(S): 25 TABLET, FILM COATED ORAL at 05:18

## 2018-02-25 NOTE — DISCHARGE NOTE ADULT - HOSPITAL COURSE
Patient is a 62/F from home, lives with her daughter, AAO*3, Bahraini speaking, with PMHx of liver cirrhosis (on rifaximin, aldactone and lasix), does not know the etiology of the cirrhosis, erythema Multiforme rash in the past, and cellulitis in the same leg comes in with redness, pain and swelling over the right leg since Saturday.    Patient was afebrile in the ED, hypotensive 92/35, received 1 L NS bolus, however patient remained asymptomatic. No leucocytosis, has macrocytosis: 102.9, INR is elevated, 2.13, could be 2/2 to liver cirrhosis, lactate is WNL, albumin is 2.3, Plt count is 34  s/p vanc and zosyn in the ED.  Admitted for RLE cellulitis.  Pt was seen by ID, Dr. Palmer, and continue on Vancomycin.  Pt was found to have Hep C which is the cause of Cirrhosis.  Cellulitis continued to improve with antibiotic.      Pt receives treatment at [facility] for cirrhosis and is advised to follow up with her PCP within 2 weeks of discharge. Patient is a 62/F from home, lives with her daughter, AAO*3, Swedish speaking, with PMHx of liver cirrhosis (on rifaximin, aldactone and lasix), does not know the etiology of the cirrhosis, erythema Multiforme rash in the past, and cellulitis in the same leg comes in with redness, pain and swelling over the right leg since Saturday.    Patient was afebrile in the ED, hypotensive 92/35, received 1 L NS bolus, however patient remained asymptomatic. No leucocytosis, has macrocytosis: 102.9, INR is elevated, 2.13, could be 2/2 to liver cirrhosis, lactate is WNL, albumin is 2.3, Plt count is 34  s/p vanc and zosyn in the ED.  Admitted for RLE cellulitis.  Pt was seen by ID, Dr. Palmer, and continue on Vancomycin.  Pt was found to have Hep C which is the cause of Cirrhosis.  Cellulitis continued to improve with antibiotic.  patient was treated with IV Vancomycin for 6 days and transitioned to Oral Linezolid continue with oral Antibiotics for 5 more days, follow up primary care in a week.    For Cirrhosis workup her Hep c antibody was positive but viral RNA was negative, likely chronic Hep C infection with underlying cirrhosis.   Pt receives treatment at [facility] for cirrhosis and is advised to follow up with her PCP upon  discharge.     Patient is been afebrile, HD stable, no leucocytosis, Blood cx negative, leg swelling continue to improve, patient is been stable for discharge, Plan of care discussed with attending physician and the patient.

## 2018-02-25 NOTE — PROGRESS NOTE ADULT - ASSESSMENT
Patient is a 62/F from home, lives with her daughter, AAO*3, Palauan speaking, with PMHx of liver cirrhosis (on rifaximin, aldactone and laxix), does not know the etiology of the cirrhosis, erythema Multiforme rash in the past, and cellulitis in the same leg comes in with redness, pain and swelling over the right leg admitted for cellulitis

## 2018-02-25 NOTE — PROGRESS NOTE ADULT - SUBJECTIVE AND OBJECTIVE BOX
PGY 1 Note discussed with supervising resident and primary attending    Patient is a 62y old  Female who presents with a chief complaint of cellulitis, r leg (20 Feb 2018 09:31)    INTERVAL HPI/OVERNIGHT EVENTS: No acute events reported overnight.  Today pt presents in no acute distress.  Pt found resting comfortably in bed. Pt remains afebrile overnight.  Rt LE remains mildly painful and remains swollen/ erythematous.  Pt has no new complaints today.     MEDICATIONS  (STANDING):  furosemide    Tablet 40 milliGRAM(s) Oral daily  lactulose Syrup 20 Gram(s) Oral at bedtime  rifaximin 550 milliGRAM(s) Oral two times a day  sodium chloride 0.9%. 1000 milliLiter(s) (60 mL/Hr) IV Continuous <Continuous>  spironolactone 50 milliGRAM(s) Oral daily  vancomycin  IVPB 1500 milliGRAM(s) IV Intermittent every 12 hours  vitamin A &amp; D Ointment 1 Application(s) Topical three times a day    MEDICATIONS  (PRN):  traMADol 25 milliGRAM(s) Oral every 8 hours PRN Severe Pain (7 - 10)    __________________________________________________  REVIEW OF SYSTEMS:    CONSTITUTIONAL: No fever  NECK: No pain or stiffness  RESPIRATORY: No cough; No shortness of breath  CARDIOVASCULAR: No chest pain, no palpitations  GASTROINTESTINAL: No pain. No nausea or vomiting; No diarrhea   NEUROLOGICAL: No headache or numbness, no tremors  MUSCULOSKELETAL: No joint pain, no muscle pain; + Mild Rt LE discomfort/ erythema/ swelling    Vital Signs Last 24 Hrs  T(C): 36.8 (24 Feb 2018 22:13), Max: 36.8 (24 Feb 2018 14:26)  T(F): 98.3 (24 Feb 2018 22:13), Max: 98.3 (24 Feb 2018 22:13)  HR: 92 (24 Feb 2018 22:13) (78 - 92)  BP: 124/54 (24 Feb 2018 22:13) (122/57 - 125/61)  RR: 17 (24 Feb 2018 22:13) (16 - 17)  SpO2: 100% (24 Feb 2018 22:13) (100% - 100%)  ________________________________________________  PHYSICAL EXAM:  GENERAL: NAD  HEENT: Normocephalic;  conjunctivae and sclerae clear; moist mucous membranes;   NECK : supple  CHEST/LUNG: Clear to auscultation bilaterally with good air entry   HEART: S1 S2  regular; no murmurs, gallops or rubs  ABDOMEN: Soft, Nontender, Nondistended; Bowel sounds present  EXTREMITIES: no cyanosis; + 2+edema;  mild LE tenderness to palpation/ erythematous, ulcerated, w/ no open lesions. Moderate swelling of LE. Sensation and motor function intact.  Difficult to assess pulses du to swelling.   NERVOUS SYSTEM:  Awake and alert; Oriented  to place, person and time ; no new deficits    _________________________________________________  LABS:                        12.6   4.5   )-----------( 50       ( 24 Feb 2018 06:03 )             37.9     02-24    138  |  104  |  14  ----------------------------<  80  3.6   |  28  |  0.55    Ca    8.2<L>      24 Feb 2018 06:03        RADIOLOGY & ADDITIONAL TESTS:  US Duplex Venous Lower Ext Ltd, Right (02.20.18 @ 12:15)     IMPRESSION:  No evidence of DVT.       US Abdomen Complete (02.20.18 @ 11:47)   IMPRESSION:  Cirrhosis.    Multiple small gallstones.       Imaging Personally Reviewed:  YES    Consultant(s) Notes Reviewed:   YES    Care Discussed with Consultants :     Plan of care was discussed with patient and /or primary care giver; all questions and concerns were addressed and care was aligned with patient's wishes.

## 2018-02-25 NOTE — DISCHARGE NOTE ADULT - MEDICATION SUMMARY - MEDICATIONS TO TAKE
I will START or STAY ON the medications listed below when I get home from the hospital:    spironolactone 25 mg oral tablet  -- 2 tab(s) by mouth once a day  -- Indication: For hypertension     vitamin A & D topical ointment  -- 1 application on skin 3 times a day  -- Indication: For Prophylactic measure    Lasix 40 mg oral tablet  -- 1 tab(s) by mouth once a day  -- Indication: For hypertension     rifAXIMin 550 mg oral tablet  -- 1 tab(s) by mouth 2 times a day  -- Indication: For Prophylactic measure    linezolid 600 mg oral tablet  -- 1 tab(s) by mouth 2 times a day   -- Avoid chocolate, wine and cheese while taking this medication.  Finish all this medication unless otherwise directed by prescriber.  Obtain medical advice before taking any non-prescription drugs as some may affect the action of this medication.    -- Indication: For Cellulitis    cholecalciferol oral tablet  -- 1000 unit(s) by mouth once a day  -- Indication: For Prophylactic measure I will START or STAY ON the medications listed below when I get home from the hospital:    spironolactone 25 mg oral tablet  -- 2 tab(s) by mouth once a day  -- Indication: For hypertension     vitamin A & D topical ointment  -- 1 application on skin 3 times a day  -- Indication: For Prophylactic measure    Lasix 40 mg oral tablet  -- 1 tab(s) by mouth once a day  -- Indication: For hypertension     lactulose 10 g/15 mL oral syrup  -- 30 milliliter(s) by mouth once a day (at bedtime)  -- Indication: For Cirrhosis    rifAXIMin 550 mg oral tablet  -- 1 tab(s) by mouth 2 times a day  -- Indication: For Prophylactic measure    linezolid 600 mg oral tablet  -- 1 tab(s) by mouth 2 times a day   -- Avoid chocolate, wine and cheese while taking this medication.  Finish all this medication unless otherwise directed by prescriber.  Obtain medical advice before taking any non-prescription drugs as some may affect the action of this medication.    -- Indication: For Cellulitis    cholecalciferol oral tablet  -- 1000 unit(s) by mouth once a day  -- Indication: For Prophylactic measure

## 2018-02-25 NOTE — DISCHARGE NOTE ADULT - ADDITIONAL INSTRUCTIONS
Follow up with [facility] medicine Clinic March 20th (Appointment obtained by ).  Follow up with Transplant team if candidate for Transplant  Follow up outpatient GI at [facility]

## 2018-02-25 NOTE — PROGRESS NOTE ADULT - PROBLEM SELECTOR PLAN 1
Clinically resolving but slowly;   Pain control improved; Tylenol for moderate pain but q 8 hrs only (Cirrhosis)  Tramadol for severe pain  continue antibiotics; Trough still not therapeutic; d/w ID increased to 1750 mg twice daily.   will consider CT LE to r/o abscess if persistent pain but seems improving   ID f/u d/w Dr. Palmer

## 2018-02-25 NOTE — PROGRESS NOTE ADULT - PROBLEM SELECTOR PLAN 1
-Patient with RLE pain, swelling and redness  -Pt remains afebrile without leukocytosis  -c/w Vancomycin 1500mg BID  -Vanc trough in AM prior to morning dose  -f/u Blood cultures- NGTD  -ID: Dr hussein

## 2018-02-25 NOTE — DISCHARGE NOTE ADULT - PATIENT PORTAL LINK FT
You can access the [facility] Patient Portal, offered by [facility], by registering with the following website: [email or web address]

## 2018-02-25 NOTE — DISCHARGE NOTE ADULT - PLAN OF CARE
You presented with bilateral leg pain/ reddness, and swelling secondary to worsening leg cellulitis.  You were started on IV antibiotics for empiric coverage.  Cellulitis improved and you were transitioned to oral antibiotics.  Please continue current regimen, and schedule close follow up with your PCP within 2 weeks of discharge. Please schedule close follow up with [facility] for GI follow up You presented with Hepatic Cirrhosis, and were found to have Hepatitis C which is the cause of the Cirrhosis. You were continued on Rifaximin, and aldactone for treatment.  Please continue current treatment, and schedule close follow up with PCP and gastroenterologist for management of cirrhosis. Please continue antibiotic therapy, and schedule close follow up with your PCP within 2 weeks of discharge You presented with bilateral leg pain/ redness, and swelling secondary to worsening leg cellulitis.  You were started on IV antibiotics Vancomycin for 6 days.  Cellulitis improved and you were transitioned to oral antibiotics.  Please continue oral linezolid 600 mg BID for 5 days, follow up with your Primary care physician at [facility] in a week. Keep your legs clean, wash was anti bacterial soap and luke warm water every day and keep leg elevated at night. avoid any trauma and injury to the leg. You presented with Hepatic Cirrhosis, likely due to chronic hep C. You were continued on Rifaximin, and aldactone for treatment.  Please continue current treatment, and schedule close follow up with PCP and gastroenterologist for management of cirrhosis. You presented with bilateral leg pain/ redness, and swelling secondary to worsening leg cellulitis.  You were started on IV antibiotics Vancomycin for 6 days.  Cellulitis improved and you were transitioned to oral antibiotics.  Please continue oral linezolid 600 mg BID for 5 days, follow up with your Primary care physician at [facility] on March 20th as scheduled. Keep your legs clean, wash was anti bacterial soap and luke warm water every day and keep leg elevated at night. avoid any trauma and injury to the leg. You presented with Right leg pain/ redness, and swelling secondary to worsening leg cellulitis despite getting outpatient treatment for 2 weeks.  You were started on IV antibiotics Vancomycin for 6 days.  Seen by Infectious Diseases Dr. Samuel Palmer. Cellulitis improved significantly and you were transitioned to oral antibiotics.  Please continue oral linezolid 600 mg BID for 5 days, follow up with Primary care physician at [facility] on March 20th as scheduled. Keep your legs clean, wash was anti bacterial soap and luke warm water every day and keep leg elevated at night. avoid any trauma and injury to the leg. Please schedule close follow up with [facility] and with [facility] transplant if a candidate for Transplant You presented with Hepatic Cirrhosis,etiology of which is not clear. Hepatitis panel was positive for Hepatitis C but Viral load negative for Hepatitis C.   You were continued on Rifaximin, and aldactone for treatment.  Please continue current treatment, and schedule close follow up with PCP and gastroenterologist for management of cirrhosis. Take Lactulose as needed for 2 to 3 bowel movements in a day to prevent Hepatic Encephalopathy Resolved; Present on admission resolved. Possibly a combination of infection contributing on top of underlying Hepatic encephalopathy from Cirrhosis.

## 2018-02-25 NOTE — PROGRESS NOTE ADULT - PROBLEM SELECTOR PLAN 2
Etiology unclear; unlikely Hep. C as Viral load undetectable; false positive screening.   continue current meds  outpatient hepatology follow up

## 2018-02-25 NOTE — PROGRESS NOTE ADULT - SUBJECTIVE AND OBJECTIVE BOX
MEDICAL ATTENDING NOTE  Patient is a 62y old  Female who presents with a chief complaint of cellulitis, r leg (25 Feb 2018 04:35)      INTERVAL HPI/OVERNIGHT EVENTS: no new complaints; concerned about going home; Right leg improved less swelling but still has signifcant redness and warmth     MEDICATIONS  (STANDING):  cholecalciferol 1000 Unit(s) Oral daily  furosemide    Tablet 40 milliGRAM(s) Oral daily  lactulose Syrup 20 Gram(s) Oral at bedtime  rifaximin 550 milliGRAM(s) Oral two times a day  sodium chloride 0.9%. 1000 milliLiter(s) (60 mL/Hr) IV Continuous <Continuous>  spironolactone 50 milliGRAM(s) Oral daily  vancomycin  IVPB 1750 milliGRAM(s) IV Intermittent every 12 hours  vitamin A &amp; D Ointment 1 Application(s) Topical three times a day    MEDICATIONS  (PRN):  traMADol 25 milliGRAM(s) Oral every 8 hours PRN Severe Pain (7 - 10)      __________________________________________________  REVIEW OF SYSTEMS:    CONSTITUTIONAL: No fever,   EYES: no acute visual disturbances  NECK: No pain or stiffness  RESPIRATORY: No cough; No shortness of breath  CARDIOVASCULAR: No chest pain, no palpitations  GASTROINTESTINAL: No pain. No nausea or vomiting; No diarrhea   NEUROLOGICAL: No headache or numbness, no tremors  MUSCULOSKELETAL: No joint pain, no muscle pain  GENITOURINARY: no dysuria, no frequency, no hesitancy  PSYCHIATRY: no depression , no anxiety  ALL OTHER  ROS negative        Vital Signs Last 24 Hrs  T(C): 36.6 (25 Feb 2018 14:09), Max: 36.8 (24 Feb 2018 22:13)  T(F): 97.8 (25 Feb 2018 14:09), Max: 98.3 (24 Feb 2018 22:13)  HR: 94 (25 Feb 2018 14:09) (83 - 94)  BP: 135/59 (25 Feb 2018 14:09) (121/63 - 135/59)  BP(mean): --  RR: 16 (25 Feb 2018 14:09) (16 - 18)  SpO2: 100% (25 Feb 2018 14:09) (98% - 100%)    ________________________________________________  PHYSICAL EXAM:  GENERAL: NAD  HEENT: Normocephalic;  conjunctivae and sclerae clear; moist mucous membranes;   NECK : supple  CHEST/LUNG: Clear to auscultation bilaterally with good air entry   HEART: S1 S2  regular; no murmurs, gallops or rubs  ABDOMEN: Soft, Nontender, Nondistended; Bowel sounds present  EXTREMITIES: no cyanosis; no edema; no calf tenderness  SKIN: warm and dry; no rash  NERVOUS SYSTEM:  Awake and alert; Oriented  to place, person and time ; no new deficits    _________________________________________________  LABS:                        12.4   5.2   )-----------( 52       ( 25 Feb 2018 04:25 )             37.4     02-25    139  |  105  |  12  ----------------------------<  83  3.8   |  28  |  0.47<L>    Ca    7.9<L>      25 Feb 2018 04:25      RADIOLOGY & ADDITIONAL TESTS: No new    Consultant(s) Notes Reviewed:   YES    Care Discussed with Consultants : ID    Plan of care was discussed with patient and /or primary care giver; all questions and concerns were addressed and care was aligned with patient's wishes. MEDICAL ATTENDING NOTE  Patient is a 62y old  Female who presents with a chief complaint of cellulitis, r leg (25 Feb 2018 04:35)      INTERVAL HPI/OVERNIGHT EVENTS: no new complaints; concerned about going home; Right leg improved less swelling but still has signifcant redness and warmth   Three BM's overnight;     MEDICATIONS  (STANDING):  cholecalciferol 1000 Unit(s) Oral daily  furosemide    Tablet 40 milliGRAM(s) Oral daily  lactulose Syrup 20 Gram(s) Oral at bedtime  rifaximin 550 milliGRAM(s) Oral two times a day  sodium chloride 0.9%. 1000 milliLiter(s) (60 mL/Hr) IV Continuous <Continuous>  spironolactone 50 milliGRAM(s) Oral daily  vancomycin  IVPB 1750 milliGRAM(s) IV Intermittent every 12 hours  vitamin A &amp; D Ointment 1 Application(s) Topical three times a day    MEDICATIONS  (PRN):  traMADol 25 milliGRAM(s) Oral every 8 hours PRN Severe Pain (7 - 10)      __________________________________________________  REVIEW OF SYSTEMS:    CONSTITUTIONAL: No fever,   EYES: no acute visual disturbances  NECK: No pain or stiffness  RESPIRATORY: No cough; No shortness of breath  CARDIOVASCULAR: No chest pain, no palpitations  GASTROINTESTINAL: No pain. No nausea or vomiting; No diarrhea   NEUROLOGICAL: No headache or numbness, no tremors  MUSCULOSKELETAL: No joint pain, no muscle pain  GENITOURINARY: no dysuria, no frequency, no hesitancy  PSYCHIATRY: no depression , no anxiety  ALL OTHER  ROS negative        Vital Signs Last 24 Hrs  T(C): 36.6 (25 Feb 2018 14:09), Max: 36.8 (24 Feb 2018 22:13)  T(F): 97.8 (25 Feb 2018 14:09), Max: 98.3 (24 Feb 2018 22:13)  HR: 94 (25 Feb 2018 14:09) (83 - 94)  BP: 135/59 (25 Feb 2018 14:09) (121/63 - 135/59)  BP(mean): --  RR: 16 (25 Feb 2018 14:09) (16 - 18)  SpO2: 100% (25 Feb 2018 14:09) (98% - 100%)    ________________________________________________  PHYSICAL EXAM:  GENERAL: NAD  HEENT: Normocephalic;  conjunctivae and sclerae clear; moist mucous membranes;   NECK : supple  CHEST/LUNG: Clear to auscultation bilaterally with good air entry   HEART: S1 S2  regular; no murmurs, gallops or rubs  ABDOMEN: Soft, Nontender, Nondistended; Bowel sounds present  EXTREMITIES: no cyanosis; no edema; no calf tenderness  SKIN: warm and dry; no rash  NERVOUS SYSTEM:  Awake and alert; Oriented  to place, person and time ; no new deficits    _________________________________________________  LABS:                        12.4   5.2   )-----------( 52       ( 25 Feb 2018 04:25 )             37.4     02-25    139  |  105  |  12  ----------------------------<  83  3.8   |  28  |  0.47<L>    Ca    7.9<L>      25 Feb 2018 04:25      RADIOLOGY & ADDITIONAL TESTS: No new    Consultant(s) Notes Reviewed:   YES    Care Discussed with Consultants : ID    Plan of care was discussed with patient and /or primary care giver; all questions and concerns were addressed and care was aligned with patient's wishes.

## 2018-02-25 NOTE — PROGRESS NOTE ADULT - ASSESSMENT
Patient is a 62/F from home, lives with her daughter, AAO*3, Barb[place] speaking, with PMHx of liver cirrhosis (on rifaximin, aldactone and laxix), does not know the etiology of the cirrhosis, erythema Multiforme rash in the past, and cellulitis in the same leg comes in with redness, pain and swelling over the right leg since saturday.  Patient remains afebrile without leukocytosis.    LE remains swollen/ erythematous, and mildly tender.  LE doppler neg for DVT.  Pt will continue Vancomycin per ID recommendation-  1500 BID  Pt also presents with Cirrhosis- HepC positive.    Will continue rifaximin, aldactone therapy.

## 2018-02-26 LAB — VANCOMYCIN TROUGH SERPL-MCNC: 12.2 UG/ML — SIGNIFICANT CHANGE UP (ref 10–20)

## 2018-02-26 PROCEDURE — 99233 SBSQ HOSP IP/OBS HIGH 50: CPT | Mod: GC

## 2018-02-26 RX ORDER — LINEZOLID 600 MG/300ML
600 INJECTION, SOLUTION INTRAVENOUS EVERY 12 HOURS
Qty: 0 | Refills: 0 | Status: COMPLETED | OUTPATIENT
Start: 2018-02-26 | End: 2018-02-27

## 2018-02-26 RX ADMIN — LINEZOLID 600 MILLIGRAM(S): 600 INJECTION, SOLUTION INTRAVENOUS at 14:30

## 2018-02-26 RX ADMIN — SPIRONOLACTONE 50 MILLIGRAM(S): 25 TABLET, FILM COATED ORAL at 06:23

## 2018-02-26 RX ADMIN — Medication 1000 UNIT(S): at 14:31

## 2018-02-26 RX ADMIN — Medication 250 MILLIGRAM(S): at 06:23

## 2018-02-26 RX ADMIN — Medication 1 APPLICATION(S): at 06:23

## 2018-02-26 RX ADMIN — Medication 1 APPLICATION(S): at 14:31

## 2018-02-26 RX ADMIN — LACTULOSE 20 GRAM(S): 10 SOLUTION ORAL at 22:43

## 2018-02-26 RX ADMIN — Medication 40 MILLIGRAM(S): at 06:26

## 2018-02-26 RX ADMIN — Medication 250 MILLIGRAM(S): at 22:42

## 2018-02-26 NOTE — PROGRESS NOTE ADULT - ATTENDING COMMENTS
Patient seen and examined; Agree with PGY1 A/P above with editing as needed. d/w Dr. Slade    Patient doing better; still has significant swelling but improved; less erythema; tenderness much improved; Patient non compliant with leg elevation as per RN. Counseling provided.  Patient did not had IV access; ID placed on Zyvox oral  Discussed with Dr. Palmer this evening will see if we can arrange 4-5 days Zyvox to better cover for her Cellulitis which has previously failed outpatient Rx. If unable to arrange, will give Doxy for a week course. Patient wants to leave tomorrow.

## 2018-02-26 NOTE — PROGRESS NOTE ADULT - SUBJECTIVE AND OBJECTIVE BOX
PGY 1 Note discussed with supervising resident and primary attending    Patient is a 62y old  Female who presents with a chief complaint of cellulitis, r leg (25 Feb 2018 04:35)      INTERVAL HPI/OVERNIGHT EVENTS: offers no new complaints; current symptoms resolving    MEDICATIONS  (STANDING):  cholecalciferol 1000 Unit(s) Oral daily  furosemide    Tablet 40 milliGRAM(s) Oral daily  lactulose Syrup 20 Gram(s) Oral at bedtime  rifaximin 550 milliGRAM(s) Oral two times a day  sodium chloride 0.9%. 1000 milliLiter(s) (60 mL/Hr) IV Continuous <Continuous>  spironolactone 50 milliGRAM(s) Oral daily  vancomycin  IVPB 1750 milliGRAM(s) IV Intermittent every 12 hours  vitamin A &amp; D Ointment 1 Application(s) Topical three times a day    MEDICATIONS  (PRN):  traMADol 25 milliGRAM(s) Oral every 8 hours PRN Severe Pain (7 - 10)      __________________________________________________  REVIEW OF SYSTEMS:    CONSTITUTIONAL: No fever,   EYES: no acute visual disturbances  NECK: No pain or stiffness  RESPIRATORY: No cough; No shortness of breath  CARDIOVASCULAR: No chest pain, no palpitations  GASTROINTESTINAL: No pain. No nausea or vomiting; No diarrhea   NEUROLOGICAL: No headache or numbness, no tremors  MUSCULOSKELETAL: No joint pain, no muscle pain  GENITOURINARY: no dysuria, no frequency, no hesitancy  PSYCHIATRY: no depression , no anxiety  ALL OTHER  ROS negative        Vital Signs Last 24 Hrs  T(C): 36.5 (26 Feb 2018 05:09), Max: 36.7 (25 Feb 2018 21:06)  T(F): 97.7 (26 Feb 2018 05:09), Max: 98.1 (25 Feb 2018 21:06)  HR: 83 (26 Feb 2018 05:09) (83 - 94)  BP: 111/72 (26 Feb 2018 05:09) (111/72 - 135/59)  BP(mean): --  RR: 18 (26 Feb 2018 05:09) (16 - 18)  SpO2: 100% (26 Feb 2018 05:09) (99% - 100%)    ________________________________________________  PHYSICAL EXAM:  GENERAL: NAD  HEENT: Normocephalic;  conjunctivae and sclerae clear; moist mucous membranes;   NECK : supple  CHEST/LUNG: Clear to auscultation bilaterally with good air entry   HEART: S1 S2  regular; no murmurs, gallops or rubs  ABDOMEN: Soft, Nontender, Nondistended; Bowel sounds present  EXTREMITIES: no cyanosis; no edema; no calf tenderness  SKIN: warm and dry; no rash  NERVOUS SYSTEM:  Awake and alert; Oriented  to place, person and time ; no new deficits    _________________________________________________  LABS:                        12.4   5.2   )-----------( 52       ( 25 Feb 2018 04:25 )             37.4     02-25    139  |  105  |  12  ----------------------------<  83  3.8   |  28  |  0.47<L>    Ca    7.9<L>      25 Feb 2018 04:25          CAPILLARY BLOOD GLUCOSE            RADIOLOGY & ADDITIONAL TESTS:    Imaging Personally Reviewed:  YES/NO    Consultant(s) Notes Reviewed:   YES/ No    Care Discussed with Consultants :     Plan of care was discussed with patient and /or primary care giver; all questions and concerns were addressed and care was aligned with patient's wishes. PGY 1 Note discussed with supervising resident and primary attending    Patient is a 62y old  Female who presents with a chief complaint of cellulitis, r leg (25 Feb 2018 04:35)      INTERVAL HPI/OVERNIGHT EVENTS: offers no new complaints; current symptoms resolving    MEDICATIONS  (STANDING):  cholecalciferol 1000 Unit(s) Oral daily  furosemide    Tablet 40 milliGRAM(s) Oral daily  lactulose Syrup 20 Gram(s) Oral at bedtime  rifaximin 550 milliGRAM(s) Oral two times a day  sodium chloride 0.9%. 1000 milliLiter(s) (60 mL/Hr) IV Continuous <Continuous>  spironolactone 50 milliGRAM(s) Oral daily  vancomycin  IVPB 1750 milliGRAM(s) IV Intermittent every 12 hours  vitamin A &amp; D Ointment 1 Application(s) Topical three times a day    MEDICATIONS  (PRN):  traMADol 25 milliGRAM(s) Oral every 8 hours PRN Severe Pain (7 - 10)      __________________________________________________  REVIEW OF SYSTEMS:    CONSTITUTIONAL: No fever,   EYES: no acute visual disturbances  NECK: No pain or stiffness  RESPIRATORY: No cough; No shortness of breath  CARDIOVASCULAR: No chest pain, no palpitations  GASTROINTESTINAL: No pain. No nausea or vomiting; No diarrhea   NEUROLOGICAL: No headache or numbness, no tremors  MUSCULOSKELETAL: No joint pain, no muscle pain  GENITOURINARY: no dysuria, no frequency, no hesitancy  PSYCHIATRY: no depression , no anxiety  ALL OTHER  ROS negative        Vital Signs Last 24 Hrs  T(C): 36.5 (26 Feb 2018 05:09), Max: 36.7 (25 Feb 2018 21:06)  T(F): 97.7 (26 Feb 2018 05:09), Max: 98.1 (25 Feb 2018 21:06)  HR: 83 (26 Feb 2018 05:09) (83 - 94)  BP: 111/72 (26 Feb 2018 05:09) (111/72 - 135/59)  BP(mean): --  RR: 18 (26 Feb 2018 05:09) (16 - 18)  SpO2: 100% (26 Feb 2018 05:09) (99% - 100%)    ________________________________________________  PHYSICAL EXAM:  GENERAL: NAD  HEENT: Normocephalic;  conjunctivae and sclerae clear; moist mucous membranes;   NECK : supple  CHEST/LUNG: Clear to auscultation bilaterally with good air entry   HEART: S1 S2  regular; no murmurs, gallops or rubs  ABDOMEN: Soft, Nontender, Nondistended; Bowel sounds present  EXTREMITIES: B/l lower swelling +2, discoloration with chronic skin changes   SKIN: warm and dry; no rash  NERVOUS SYSTEM:  Awake and alert; Oriented  to place, person and time ; no new deficits    _________________________________________________  LABS:                        12.4   5.2   )-----------( 52       ( 25 Feb 2018 04:25 )             37.4     02-25    139  |  105  |  12  ----------------------------<  83  3.8   |  28  |  0.47<L>    Ca    7.9<L>      25 Feb 2018 04:25          CAPILLARY BLOOD GLUCOSE            RADIOLOGY & ADDITIONAL TESTS:    Imaging Personally Reviewed:  YES/NO    Consultant(s) Notes Reviewed:   YES/ No    Care Discussed with Consultants :     Plan of care was discussed with patient and /or primary care giver; all questions and concerns were addressed and care was aligned with patient's wishes. PGY 1 Note discussed with supervising resident and primary attending    Patient is a 62y old  Female who presents with a chief complaint of cellulitis, r leg (25 Feb 2018 04:35)      INTERVAL HPI/OVERNIGHT EVENTS: offers no new complaints; current symptoms resolving    MEDICATIONS  (STANDING):  cholecalciferol 1000 Unit(s) Oral daily  furosemide    Tablet 40 milliGRAM(s) Oral daily  lactulose Syrup 20 Gram(s) Oral at bedtime  rifaximin 550 milliGRAM(s) Oral two times a day  sodium chloride 0.9%. 1000 milliLiter(s) (60 mL/Hr) IV Continuous <Continuous>  spironolactone 50 milliGRAM(s) Oral daily  vancomycin  IVPB 1750 milliGRAM(s) IV Intermittent every 12 hours  vitamin A &amp; D Ointment 1 Application(s) Topical three times a day    MEDICATIONS  (PRN):  traMADol 25 milliGRAM(s) Oral every 8 hours PRN Severe Pain (7 - 10)      __________________________________________________  REVIEW OF SYSTEMS:    CONSTITUTIONAL: No fever,   EYES: no acute visual disturbances  NECK: No pain or stiffness  RESPIRATORY: No cough; No shortness of breath  CARDIOVASCULAR: No chest pain, no palpitations  GASTROINTESTINAL: No pain. No nausea or vomiting; No diarrhea   NEUROLOGICAL: No headache or numbness, no tremors  MUSCULOSKELETAL: No joint pain, no muscle pain  GENITOURINARY: no dysuria, no frequency, no hesitancy  PSYCHIATRY: no depression , no anxiety  ALL OTHER  ROS negative        Vital Signs Last 24 Hrs  T(C): 36.5 (26 Feb 2018 05:09), Max: 36.7 (25 Feb 2018 21:06)  T(F): 97.7 (26 Feb 2018 05:09), Max: 98.1 (25 Feb 2018 21:06)  HR: 83 (26 Feb 2018 05:09) (83 - 94)  BP: 111/72 (26 Feb 2018 05:09) (111/72 - 135/59)  BP(mean): --  RR: 18 (26 Feb 2018 05:09) (16 - 18)  SpO2: 100% (26 Feb 2018 05:09) (99% - 100%)    ________________________________________________  PHYSICAL EXAM:  GENERAL: NAD  HEENT: Normocephalic;  conjunctivae and sclerae clear; moist mucous membranes;   NECK : supple  CHEST/LUNG: Clear to auscultation bilaterally with good air entry   HEART: S1 S2  regular; no murmurs, gallops or rubs  ABDOMEN: Soft, Nontender, Nondistended; Bowel sounds present  EXTREMITIES: B/l lower swelling +2, discoloration with chronic skin changes   SKIN: warm and dry; no rash  NERVOUS SYSTEM:  Awake and alert; Oriented  to place, person and time ; no new deficits    _________________________________________________  LABS:                        12.4   5.2   )-----------( 52       ( 25 Feb 2018 04:25 )             37.4     02-25    139  |  105  |  12  ----------------------------<  83  3.8   |  28  |  0.47<L>    Ca    7.9<L>      25 Feb 2018 04:25          CAPILLARY BLOOD GLUCOSE            RADIOLOGY & ADDITIONAL TESTS:    Imaging Personally Reviewed:  no    Consultant(s) Notes Reviewed:   YES    Care Discussed with Consultants : yes    Plan of care was discussed with patient and /or primary care giver; all questions and concerns were addressed and care was aligned with patient's wishes.

## 2018-02-26 NOTE — PROGRESS NOTE ADULT - PROBLEM SELECTOR PLAN 1
-Patient with RLE pain, swelling and redness  -Pt remains afebrile without leukocytosis  -c/w Vancomycin 1500mg BID  -Vanc trough in AM prior to morning dose  -f/u Blood cultures- NGTD  -ID: Dr hussein -Patient with RLE pain, swelling and redness  -Pt remains afebrile without leukocytosis, Blood cx negative   - patient has sub therapeutic vanco trough   - Vancomycin increased to 1750 mg iv   - patient has no IV access   -Vanc trough in AM prior to morning dose  - will f/u with ID Dr Palmer reg Antibiotics -Patient with RLE pain, swelling and redness  -Pt remains afebrile without leukocytosis, Blood cx negative   - patient has sub therapeutic vanco trough   - Vancomycin increased to 1750 mg iv   - patient has no IV access, patient refused for IV access   will hold on Vanco and give Linezolid 600 mg BID and start patient on PO Doxycycline tomorrow onwards   - will f/u with ID Dr Palmer reg Antibiotics  - Likely DC'ed in am -Patient with RLE pain, swelling and redness  -Pt remains afebrile without leukocytosis, Blood cx negative   - patient has sub therapeutic vanco trough   - Vancomycin increased to 1750 mg iv   - patient has no IV access, patient refused for IV access   will hold on Vanco and give Linezolid 600 mg BID and start patient on PO Doxycycline tomorrow onwards   - will f/u with ID Dr Palmer reg Antibiotics  - Likely DC in am on oral Doxycycline

## 2018-02-26 NOTE — PROGRESS NOTE ADULT - ASSESSMENT
Patient is a 62/F from home, lives with her daughter, AAO*3, Haitian speaking, with PMHx of liver cirrhosis (on rifaximin, aldactone and laxix), does not know the etiology of the cirrhosis, erythema Multiforme rash in the past, and cellulitis in the same leg comes in with redness, pain and swelling over the right leg since saturday.  Patient remains afebrile without leukocytosis.    LE remains swollen/ erythematous, and mildly tender.  LE doppler neg for DVT.  Pt will continue Vancomycin per ID recommendation-  1500 BID  Pt also presents with Cirrhosis- HepC positive.    Will continue rifaximin, aldactone therapy. Patient is a 62/F from home, lives with her daughter, AAO*3, Marshallese speaking, with PMHx of liver cirrhosis (on rifaximin, aldactone and laxix), does not know the etiology of the cirrhosis, erythema Multiforme rash in the past, and cellulitis in the same leg comes in with redness, pain and swelling over the right leg since saturday.  Patient remains afebrile without leukocytosis.

## 2018-02-26 NOTE — PROGRESS NOTE ADULT - PROBLEM SELECTOR PLAN 2
-Cirrhosis  -hepatitis panel sig for Hepatitis C   -HIV neg  -INR and platelet count improving  -f/u Hep C  viral load  -c/w rifaximin, aldactone and lasix as tolerated by her BP  -MELD-Na score: 13, indicating 7-10% 30 day mortality  -f/u w/ Gastroenterologist as OP -Cirrhosis  -hepatitis panel sig for Hepatitis C   -HIV neg  - HCV RNA negative  -c/w rifaximin, aldactone and lasix as tolerated by her BP  -MELD-Na score: 13, indicating 7-10% 30 day mortality  - Likely chronic hepatitis Hep C   -f/u w/ Gastroenterologist as OP

## 2018-02-27 VITALS
DIASTOLIC BLOOD PRESSURE: 56 MMHG | SYSTOLIC BLOOD PRESSURE: 115 MMHG | TEMPERATURE: 98 F | HEART RATE: 81 BPM | OXYGEN SATURATION: 99 % | RESPIRATION RATE: 14 BRPM

## 2018-02-27 LAB
ALBUMIN SERPL ELPH-MCNC: 1.8 G/DL — LOW (ref 3.5–5)
ALP SERPL-CCNC: 118 U/L — SIGNIFICANT CHANGE UP (ref 40–120)
ALT FLD-CCNC: 23 U/L DA — SIGNIFICANT CHANGE UP (ref 10–60)
AMMONIA BLD-MCNC: 78 UMOL/L — HIGH (ref 11–32)
ANION GAP SERPL CALC-SCNC: 8 MMOL/L — SIGNIFICANT CHANGE UP (ref 5–17)
AST SERPL-CCNC: 36 U/L — SIGNIFICANT CHANGE UP (ref 10–40)
BASOPHILS # BLD AUTO: 0.1 K/UL — SIGNIFICANT CHANGE UP (ref 0–0.2)
BASOPHILS NFR BLD AUTO: 1.1 % — SIGNIFICANT CHANGE UP (ref 0–2)
BILIRUB SERPL-MCNC: 2.5 MG/DL — HIGH (ref 0.2–1.2)
BUN SERPL-MCNC: 11 MG/DL — SIGNIFICANT CHANGE UP (ref 7–18)
CALCIUM SERPL-MCNC: 7.8 MG/DL — LOW (ref 8.4–10.5)
CHLORIDE SERPL-SCNC: 107 MMOL/L — SIGNIFICANT CHANGE UP (ref 96–108)
CO2 SERPL-SCNC: 25 MMOL/L — SIGNIFICANT CHANGE UP (ref 22–31)
CREAT SERPL-MCNC: 0.47 MG/DL — LOW (ref 0.5–1.3)
EOSINOPHIL # BLD AUTO: 0.1 K/UL — SIGNIFICANT CHANGE UP (ref 0–0.5)
EOSINOPHIL NFR BLD AUTO: 2.3 % — SIGNIFICANT CHANGE UP (ref 0–6)
GLUCOSE SERPL-MCNC: 81 MG/DL — SIGNIFICANT CHANGE UP (ref 70–99)
HCT VFR BLD CALC: 37 % — SIGNIFICANT CHANGE UP (ref 34.5–45)
HGB BLD-MCNC: 12.4 G/DL — SIGNIFICANT CHANGE UP (ref 11.5–15.5)
LYMPHOCYTES # BLD AUTO: 1 K/UL — SIGNIFICANT CHANGE UP (ref 1–3.3)
LYMPHOCYTES # BLD AUTO: 21 % — SIGNIFICANT CHANGE UP (ref 13–44)
MAGNESIUM SERPL-MCNC: 1.5 MG/DL — LOW (ref 1.6–2.6)
MCHC RBC-ENTMCNC: 33.5 GM/DL — SIGNIFICANT CHANGE UP (ref 32–36)
MCHC RBC-ENTMCNC: 34.4 PG — HIGH (ref 27–34)
MCV RBC AUTO: 102.8 FL — HIGH (ref 80–100)
MONOCYTES # BLD AUTO: 0.4 K/UL — SIGNIFICANT CHANGE UP (ref 0–0.9)
MONOCYTES NFR BLD AUTO: 8 % — SIGNIFICANT CHANGE UP (ref 2–14)
NEUTROPHILS # BLD AUTO: 3.2 K/UL — SIGNIFICANT CHANGE UP (ref 1.8–7.4)
NEUTROPHILS NFR BLD AUTO: 67.5 % — SIGNIFICANT CHANGE UP (ref 43–77)
PHOSPHATE SERPL-MCNC: 2.9 MG/DL — SIGNIFICANT CHANGE UP (ref 2.5–4.5)
PLATELET # BLD AUTO: 68 K/UL — LOW (ref 150–400)
POTASSIUM SERPL-MCNC: 3.5 MMOL/L — SIGNIFICANT CHANGE UP (ref 3.5–5.3)
POTASSIUM SERPL-SCNC: 3.5 MMOL/L — SIGNIFICANT CHANGE UP (ref 3.5–5.3)
PROT SERPL-MCNC: 6.4 G/DL — SIGNIFICANT CHANGE UP (ref 6–8.3)
RBC # BLD: 3.59 M/UL — LOW (ref 3.8–5.2)
RBC # FLD: 13.5 % — SIGNIFICANT CHANGE UP (ref 10.3–14.5)
SODIUM SERPL-SCNC: 140 MMOL/L — SIGNIFICANT CHANGE UP (ref 135–145)
WBC # BLD: 4.7 K/UL — SIGNIFICANT CHANGE UP (ref 3.8–10.5)
WBC # FLD AUTO: 4.7 K/UL — SIGNIFICANT CHANGE UP (ref 3.8–10.5)

## 2018-02-27 PROCEDURE — 80074 ACUTE HEPATITIS PANEL: CPT

## 2018-02-27 PROCEDURE — 80061 LIPID PANEL: CPT

## 2018-02-27 PROCEDURE — 87522 HEPATITIS C REVRS TRNSCRPJ: CPT

## 2018-02-27 PROCEDURE — 85610 PROTHROMBIN TIME: CPT

## 2018-02-27 PROCEDURE — 84100 ASSAY OF PHOSPHORUS: CPT

## 2018-02-27 PROCEDURE — 82607 VITAMIN B-12: CPT

## 2018-02-27 PROCEDURE — 85730 THROMBOPLASTIN TIME PARTIAL: CPT

## 2018-02-27 PROCEDURE — 83036 HEMOGLOBIN GLYCOSYLATED A1C: CPT

## 2018-02-27 PROCEDURE — 84443 ASSAY THYROID STIM HORMONE: CPT

## 2018-02-27 PROCEDURE — 85027 COMPLETE CBC AUTOMATED: CPT

## 2018-02-27 PROCEDURE — 96375 TX/PRO/DX INJ NEW DRUG ADDON: CPT

## 2018-02-27 PROCEDURE — 80048 BASIC METABOLIC PNL TOTAL CA: CPT

## 2018-02-27 PROCEDURE — 82746 ASSAY OF FOLIC ACID SERUM: CPT

## 2018-02-27 PROCEDURE — 82306 VITAMIN D 25 HYDROXY: CPT

## 2018-02-27 PROCEDURE — 99239 HOSP IP/OBS DSCHRG MGMT >30: CPT

## 2018-02-27 PROCEDURE — 82140 ASSAY OF AMMONIA: CPT

## 2018-02-27 PROCEDURE — 71045 X-RAY EXAM CHEST 1 VIEW: CPT

## 2018-02-27 PROCEDURE — 83605 ASSAY OF LACTIC ACID: CPT

## 2018-02-27 PROCEDURE — 87521 HEPATITIS C PROBE&RVRS TRNSC: CPT

## 2018-02-27 PROCEDURE — 76700 US EXAM ABDOM COMPLETE: CPT

## 2018-02-27 PROCEDURE — 82248 BILIRUBIN DIRECT: CPT

## 2018-02-27 PROCEDURE — 96374 THER/PROPH/DIAG INJ IV PUSH: CPT

## 2018-02-27 PROCEDURE — 87040 BLOOD CULTURE FOR BACTERIA: CPT

## 2018-02-27 PROCEDURE — 93971 EXTREMITY STUDY: CPT

## 2018-02-27 PROCEDURE — 83735 ASSAY OF MAGNESIUM: CPT

## 2018-02-27 PROCEDURE — 93005 ELECTROCARDIOGRAM TRACING: CPT

## 2018-02-27 PROCEDURE — 80202 ASSAY OF VANCOMYCIN: CPT

## 2018-02-27 PROCEDURE — 80053 COMPREHEN METABOLIC PANEL: CPT

## 2018-02-27 PROCEDURE — 99285 EMERGENCY DEPT VISIT HI MDM: CPT | Mod: 25

## 2018-02-27 PROCEDURE — 87389 HIV-1 AG W/HIV-1&-2 AB AG IA: CPT

## 2018-02-27 RX ORDER — FUROSEMIDE 40 MG
1 TABLET ORAL
Qty: 0 | Refills: 0 | COMMUNITY

## 2018-02-27 RX ORDER — CHOLECALCIFEROL (VITAMIN D3) 125 MCG
1000 CAPSULE ORAL
Qty: 0 | Refills: 0 | COMMUNITY
Start: 2018-02-27

## 2018-02-27 RX ORDER — FUROSEMIDE 40 MG
1 TABLET ORAL
Qty: 30 | Refills: 0 | OUTPATIENT
Start: 2018-02-27 | End: 2018-03-28

## 2018-02-27 RX ORDER — SPIRONOLACTONE 25 MG/1
2 TABLET, FILM COATED ORAL
Qty: 60 | Refills: 0 | OUTPATIENT
Start: 2018-02-27 | End: 2018-03-28

## 2018-02-27 RX ORDER — LACTULOSE 10 G/15ML
30 SOLUTION ORAL
Qty: 1000 | Refills: 0 | OUTPATIENT
Start: 2018-02-27 | End: 2018-03-28

## 2018-02-27 RX ORDER — LINEZOLID 600 MG/300ML
1 INJECTION, SOLUTION INTRAVENOUS
Qty: 10 | Refills: 0 | OUTPATIENT
Start: 2018-02-27 | End: 2018-03-03

## 2018-02-27 RX ORDER — LACTULOSE 10 G/15ML
30 SOLUTION ORAL
Qty: 0 | Refills: 0 | COMMUNITY
Start: 2018-02-27

## 2018-02-27 RX ADMIN — Medication 40 MILLIGRAM(S): at 05:38

## 2018-02-27 RX ADMIN — SPIRONOLACTONE 50 MILLIGRAM(S): 25 TABLET, FILM COATED ORAL at 05:38

## 2018-02-27 RX ADMIN — Medication 250 MILLIGRAM(S): at 05:38

## 2018-02-27 RX ADMIN — Medication 1000 UNIT(S): at 13:08

## 2018-02-27 NOTE — PROGRESS NOTE ADULT - PROBLEM SELECTOR PLAN 3
-Secondary to Liver Cirrhosis  -Monitor platelet count  -Hold DVT prophy -Secondary to Liver Cirrhosis  platelet count remains stable  55K     -Hold DVT prophy

## 2018-02-27 NOTE — PROGRESS NOTE ADULT - PROBLEM SELECTOR PLAN 2
-Cirrhosis  -hepatitis panel sig for Hepatitis C   -HIV neg  - HCV RNA negative  -c/w rifaximin, aldactone and lasix as tolerated by her BP  -MELD-Na score: 13, indicating 7-10% 30 day mortality  - Likely chronic hepatitis Hep C   -f/u w/ Gastroenterologist as OP

## 2018-02-27 NOTE — PROGRESS NOTE ADULT - ASSESSMENT
Patient is a 62/F from home, lives with her daughter, AAO*3, Belarusian speaking, with PMHx of liver cirrhosis (on rifaximin, aldactone and laxix), does not know the etiology of the cirrhosis, erythema Multiforme rash in the past, and cellulitis in the same leg comes in with redness, pain and swelling over the right leg since saturday.  Patient remains afebrile without leukocytosis.

## 2018-02-27 NOTE — PROGRESS NOTE ADULT - SUBJECTIVE AND OBJECTIVE BOX
PGY 1 Note discussed with supervising resident and primary attending    Patient is a 62y old  Female who presents with a chief complaint of cellulitis, r leg (25 Feb 2018 04:35)      INTERVAL HPI/OVERNIGHT EVENTS: offers no new complaints; current symptoms resolving    MEDICATIONS  (STANDING):  cholecalciferol 1000 Unit(s) Oral daily  furosemide    Tablet 40 milliGRAM(s) Oral daily  lactulose Syrup 20 Gram(s) Oral at bedtime  rifaximin 550 milliGRAM(s) Oral two times a day  sodium chloride 0.9%. 1000 milliLiter(s) (60 mL/Hr) IV Continuous <Continuous>  spironolactone 50 milliGRAM(s) Oral daily  vancomycin  IVPB 1750 milliGRAM(s) IV Intermittent every 12 hours  vitamin A &amp; D Ointment 1 Application(s) Topical three times a day    MEDICATIONS  (PRN):  traMADol 25 milliGRAM(s) Oral every 8 hours PRN Severe Pain (7 - 10)      __________________________________________________  REVIEW OF SYSTEMS:    CONSTITUTIONAL: No fever,   EYES: no acute visual disturbances  NECK: No pain or stiffness  RESPIRATORY: No cough; No shortness of breath  CARDIOVASCULAR: No chest pain, no palpitations  GASTROINTESTINAL: No pain. No nausea or vomiting; No diarrhea   NEUROLOGICAL: No headache or numbness, no tremors  MUSCULOSKELETAL: No joint pain, no muscle pain  GENITOURINARY: no dysuria, no frequency, no hesitancy  PSYCHIATRY: no depression , no anxiety  ALL OTHER  ROS negative        Vital Signs Last 24 Hrs  T(C): 36.4 (27 Feb 2018 05:29), Max: 37.1 (26 Feb 2018 14:23)  T(F): 97.6 (27 Feb 2018 05:29), Max: 98.8 (26 Feb 2018 14:23)  HR: 79 (27 Feb 2018 05:29) (79 - 92)  BP: 119/63 (27 Feb 2018 05:29) (98/77 - 121/73)  BP(mean): --  RR: 18 (27 Feb 2018 05:29) (14 - 18)  SpO2: 95% (27 Feb 2018 05:29) (95% - 100%)    ________________________________________________  PHYSICAL EXAM:  GENERAL: NAD  HEENT: Normocephalic;  conjunctivae and sclerae clear; moist mucous membranes;   NECK : supple  CHEST/LUNG: Clear to auscultation bilaterally with good air entry   HEART: S1 S2  regular; no murmurs, gallops or rubs  ABDOMEN: Soft, Nontender, Nondistended; Bowel sounds present  EXTREMITIES: no cyanosis; no edema; no calf tenderness  SKIN: warm and dry; no rash  NERVOUS SYSTEM:  Awake and alert; Oriented  to place, person and time ; no new deficits    _________________________________________________  LABS:              CAPILLARY BLOOD GLUCOSE            RADIOLOGY & ADDITIONAL TESTS:    Imaging Personally Reviewed:  YES/NO    Consultant(s) Notes Reviewed:   YES/ No    Care Discussed with Consultants :     Plan of care was discussed with patient and /or primary care giver; all questions and concerns were addressed and care was aligned with patient's wishes. PGY 1 Note discussed with supervising resident and primary attending    Patient is a 62y old  Female who presents with a chief complaint of cellulitis, r leg (25 Feb 2018 04:35)      INTERVAL HPI/OVERNIGHT EVENTS: patient still complains of mild right leg pain, otherwise pt is afebrile, HD stable, ambulating well,  offers no new complaints;     MEDICATIONS  (STANDING):  cholecalciferol 1000 Unit(s) Oral daily  furosemide    Tablet 40 milliGRAM(s) Oral daily  lactulose Syrup 20 Gram(s) Oral at bedtime  rifaximin 550 milliGRAM(s) Oral two times a day  sodium chloride 0.9%. 1000 milliLiter(s) (60 mL/Hr) IV Continuous <Continuous>  spironolactone 50 milliGRAM(s) Oral daily  vancomycin  IVPB 1750 milliGRAM(s) IV Intermittent every 12 hours  vitamin A &amp; D Ointment 1 Application(s) Topical three times a day    MEDICATIONS  (PRN):  traMADol 25 milliGRAM(s) Oral every 8 hours PRN Severe Pain (7 - 10)      __________________________________________________  REVIEW OF SYSTEMS:    CONSTITUTIONAL: No fever,   EYES: no acute visual disturbances  NECK: No pain or stiffness  RESPIRATORY: No cough; No shortness of breath  CARDIOVASCULAR: No chest pain, no palpitations  GASTROINTESTINAL: No pain. No nausea or vomiting; No diarrhea   NEUROLOGICAL: No headache or numbness, no tremors  MUSCULOSKELETAL: No joint pain, no muscle pain  GENITOURINARY: no dysuria, no frequency, no hesitancy  PSYCHIATRY: no depression , no anxiety  ALL OTHER  ROS negative        Vital Signs Last 24 Hrs  T(C): 36.4 (27 Feb 2018 05:29), Max: 37.1 (26 Feb 2018 14:23)  T(F): 97.6 (27 Feb 2018 05:29), Max: 98.8 (26 Feb 2018 14:23)  HR: 79 (27 Feb 2018 05:29) (79 - 92)  BP: 119/63 (27 Feb 2018 05:29) (98/77 - 121/73)  BP(mean): --  RR: 18 (27 Feb 2018 05:29) (14 - 18)  SpO2: 95% (27 Feb 2018 05:29) (95% - 100%)    ________________________________________________  PHYSICAL EXAM:  GENERAL: NAD  HEENT: Normocephalic;  conjunctivae and sclerae clear; moist mucous membranes;   NECK : supple  CHEST/LUNG: Clear to auscultation bilaterally with good air entry   HEART: S1 S2  regular; no murmurs, gallops or rubs  ABDOMEN: Soft, Nontender, Nondistended; Bowel sounds present  EXTREMITIES: mild right LE ext swelling and tenderness,   SKIN: B/l LE dark pigmentation   NERVOUS SYSTEM:  Awake and alert; Oriented  to place, person and time ; no new deficits    _________________________________________________  LABS:              CAPILLARY BLOOD GLUCOSE            RADIOLOGY & ADDITIONAL TESTS:      Care Discussed with Consultants : yes    Plan of care was discussed with patient and /or primary care giver; all questions and concerns were addressed and care was aligned with patient's wishes.

## 2018-02-27 NOTE — PROGRESS NOTE ADULT - PROVIDER SPECIALTY LIST ADULT
Hospitalist
Infectious Disease
Internal Medicine
Infectious Disease

## 2018-02-27 NOTE — PROGRESS NOTE ADULT - ASSESSMENT
right leg cellulitis - improving now    plan - dc home today on zyvox 600mgs po bid x 4 days  reconsult prn

## 2018-02-27 NOTE — PROGRESS NOTE ADULT - PROBLEM SELECTOR PROBLEM 3
Prophylactic measure
Prophylactic measure
Thrombocytopenia

## 2018-02-27 NOTE — PROGRESS NOTE ADULT - RS GEN PE MLT RESP DETAILS PC
no rhonchi/good air movement/clear to auscultation bilaterally/no rales/no wheezes
clear to auscultation bilaterally/no rhonchi/no wheezes/no rales

## 2018-02-27 NOTE — PROGRESS NOTE ADULT - SUBJECTIVE AND OBJECTIVE BOX
62y Female pt doing better, she got IV access back and so got vanco iv last night and again this am , she has less right leg swelling , redness and pain , she is going to get [facility] for a few days to complete her therapy. No fevers or chills, no diarrhea.    Meds:  rifaximin 550 milliGRAM(s) Oral two times a day  vancomycin  IVPB 1750 milliGRAM(s) IV Intermittent every 12 hours    Allergies    No Known Allergies    Intolerances        VITALS:  Vital Signs Last 24 Hrs  T(C): 36.4 (27 Feb 2018 05:29), Max: 37.1 (26 Feb 2018 14:23)  T(F): 97.6 (27 Feb 2018 05:29), Max: 98.8 (26 Feb 2018 14:23)  HR: 79 (27 Feb 2018 05:29) (79 - 92)  BP: 119/63 (27 Feb 2018 05:29) (98/77 - 121/73)  BP(mean): --  RR: 18 (27 Feb 2018 05:29) (14 - 18)  SpO2: 95% (27 Feb 2018 05:29) (95% - 100%)    LABS/DIAGNOSTIC TESTS:                          12.4   4.7   )-----------( 68       ( 27 Feb 2018 08:57 )             37.0         02-27    140  |  107  |  11  ----------------------------<  81  3.5   |  25  |  0.47<L>    Ca    7.8<L>      27 Feb 2018 08:57  Phos  2.9     02-27  Mg     1.5     02-27    TPro  6.4  /  Alb  1.8<L>  /  TBili  2.5<H>  /  DBili  x   /  AST  36  /  ALT  23  /  AlkPhos  118  02-27      LIVER FUNCTIONS - ( 27 Feb 2018 08:57 )  Alb: 1.8 g/dL / Pro: 6.4 g/dL / ALK PHOS: 118 U/L / ALT: 23 U/L DA / AST: 36 U/L / GGT: x             CULTURES: .Blood Blood-Peripheral  02-20 @ 09:16   No growth at 5 days.  --  --            RADIOLOGY:      ROS:  [  ] UNABLE TO ELICIT

## 2018-02-27 NOTE — PROGRESS NOTE ADULT - SKIN COMMENTS
erythema and warmth and swelling of right leg much better
erythema improved but still present, warmth , swelling and tenderness also still present though improved

## 2018-02-27 NOTE — PROGRESS NOTE ADULT - ATTENDING COMMENTS
Patient seen and examined with  connie who translated; Agree with PGY1 A/P above with editing as needed. d/w Dr. Slade    Patient doing much better; significantly improved; less erythema; tenderness much improved;    Vital Signs Last 24 Hrs: stable;   T(C): 36.4 (27 Feb 2018 05:29), Max: 37.1 (26 Feb 2018 14:23)  T(F): 97.6 (27 Feb 2018 05:29), Max: 98.8 (26 Feb 2018 14:23)  HR: 79 (27 Feb 2018 05:29) (79 - 92)  BP: 119/63 (27 Feb 2018 05:29) (98/77 - 121/73)  RR: 18 (27 Feb 2018 05:29) (14 - 18)  SpO2: 95% (27 Feb 2018 05:29) (95% - 100%)    Plan:  Discussed with Dr. Palmer this evening will see if we can arrange 4-5 days Zyvox to better cover for her Cellulitis which has previously failed outpatient Discussed with Social work Connie this morning who arranged for 5 days of Zyvox from [facility] to be delivered here. She also arranged outpatient appt. at [facility] for March 20th.  Discussed with daughter last night and updated plan of care.   Patient Counseling provided to keep leg elevated and wash with luke warm water daily and keep clean and dry    Discussed with PGY 1 Dr. Slade  Discussed with RN/ SW/ Patient Patient seen and examined with  connie who translated; Agree with PGY1 A/P above with editing as needed. d/w Dr. Slade    Patient doing much better; significantly improved; less erythema; tenderness much improved;    Vital Signs Last 24 Hrs: stable;   T(C): 36.4 (27 Feb 2018 05:29), Max: 37.1 (26 Feb 2018 14:23)  T(F): 97.6 (27 Feb 2018 05:29), Max: 98.8 (26 Feb 2018 14:23)  HR: 79 (27 Feb 2018 05:29) (79 - 92)  BP: 119/63 (27 Feb 2018 05:29) (98/77 - 121/73)  RR: 18 (27 Feb 2018 05:29) (14 - 18)  SpO2: 95% (27 Feb 2018 05:29) (95% - 100%)    Plan:  Discussed with Dr. Palmer this evening will see if we can arrange 4-5 days Zyvox to better cover for her Cellulitis which has previously failed outpatient Discussed with Social work Connie this morning who arranged for 5 days of Zyvox from [facility] to be delivered here. She also arranged outpatient appt. at [facility] for March 20th.  Spoke with Daughter Adelina over the phone [phone] last night and updated plan of care. I also just spoke with her and updated plan of getting ABX from our Pharmacy. She will need prescription for rest of her meds, which will fill up from [facility] pharmacy   Patient Counseling provided to keep leg elevated and wash with luke warm water daily and keep clean and dry    Discussed with PGY 1 Dr. Slade  Discussed with RN/ SW/ Patient

## 2018-02-27 NOTE — PROGRESS NOTE ADULT - PROBLEM SELECTOR PLAN 1
-Patient with RLE pain, swelling and redness  -Pt remains afebrile without leukocytosis, Blood cx negative   - patient has sub therapeutic vanco trough   - Vancomycin increased to 1750 mg iv   - patient has no IV access, patient refused for IV access   will hold on Vanco and give Linezolid 600 mg BID and start patient on PO Doxycycline tomorrow onwards   - will f/u with ID Dr Palmer reg Antibiotics  - Likely DC in am on oral Doxycycline -Patient with RLE pain, swelling and redness  - still mild swelling and tenderness present   -Pt remains afebrile without leukocytosis, Blood cx negative   - Vancomycin increased to 1750 mg iv still sub- therapeutic vanco level    - Likely DC in am on oral Doxycycline -Patient with RLE pain, swelling and redness  - still mild swelling and tenderness present   -Pt remains afebrile without leukocytosis, Blood cx negative   - Vancomycin increased to 1750 mg iv still sub- therapeutic vanco level    -Discussed with Dr. Palmer this evening will see if we can arrange 4-5 days Zyvox to better cover for her Cellulitis which has previously failed outpatient   - will Discharge the patient on Linezolid 600 mg BID for 5 days

## 2018-07-28 ENCOUNTER — EMERGENCY (EMERGENCY)
Facility: HOSPITAL | Age: 63
LOS: 1 days | Discharge: ANOTHER HOSPITAL | End: 2018-07-28
Attending: EMERGENCY MEDICINE
Payer: MEDICAID

## 2018-07-28 VITALS
OXYGEN SATURATION: 100 % | RESPIRATION RATE: 16 BRPM | HEIGHT: 65 IN | WEIGHT: 197.09 LBS | SYSTOLIC BLOOD PRESSURE: 168 MMHG | TEMPERATURE: 98 F | DIASTOLIC BLOOD PRESSURE: 76 MMHG | HEART RATE: 81 BPM

## 2018-07-28 PROCEDURE — 76705 ECHO EXAM OF ABDOMEN: CPT | Mod: 26

## 2018-07-28 PROCEDURE — 99285 EMERGENCY DEPT VISIT HI MDM: CPT

## 2018-07-28 RX ORDER — MORPHINE SULFATE 50 MG/1
4 CAPSULE, EXTENDED RELEASE ORAL ONCE
Qty: 0 | Refills: 0 | Status: DISCONTINUED | OUTPATIENT
Start: 2018-07-28 | End: 2018-07-28

## 2018-07-28 RX ORDER — SODIUM CHLORIDE 9 MG/ML
3 INJECTION INTRAMUSCULAR; INTRAVENOUS; SUBCUTANEOUS ONCE
Qty: 0 | Refills: 0 | Status: COMPLETED | OUTPATIENT
Start: 2018-07-28 | End: 2018-07-28

## 2018-07-28 RX ORDER — ONDANSETRON 8 MG/1
4 TABLET, FILM COATED ORAL ONCE
Qty: 0 | Refills: 0 | Status: COMPLETED | OUTPATIENT
Start: 2018-07-28 | End: 2018-07-29

## 2018-07-28 RX ORDER — LIDOCAINE 4 G/100G
10 CREAM TOPICAL ONCE
Qty: 0 | Refills: 0 | Status: COMPLETED | OUTPATIENT
Start: 2018-07-28 | End: 2018-07-28

## 2018-07-28 RX ORDER — FAMOTIDINE 10 MG/ML
20 INJECTION INTRAVENOUS ONCE
Qty: 0 | Refills: 0 | Status: COMPLETED | OUTPATIENT
Start: 2018-07-28 | End: 2018-07-29

## 2018-07-28 NOTE — ED ADULT NURSE NOTE - OBJECTIVE STATEMENT
patients daughter stated her mothers abdominal pain started an hour ago today, reports vomiting 2x and diarrhea x4, tenderness to touch, mid back pain, bilateral edema with discoloration to her right leg. patient has a h/o of cirrhosis.

## 2018-07-28 NOTE — ED PROVIDER NOTE - MEDICAL DECISION MAKING DETAILS
62 y/o F patient with possible food poisoning vs. colitis vs. gastritis vs. cholecystitis. Will hydrate, get US of the liver and gallbladder, CT and reassess

## 2018-07-28 NOTE — ED PROVIDER NOTE - PROGRESS NOTE DETAILS
Likely choledocholithiasis, will admit to Medicine for GI consult/ decompression, Dr Christensen (surgery) aware. D/w transfer center, GI, hospitalist, ED doc at [facility]. Will transfer for emergent ERCP/decompression at [facility] Likely choledocholithiasis, will admit to Medicine for GI consult/ decompression

## 2018-07-28 NOTE — ED PROVIDER NOTE - OBJECTIVE STATEMENT
64 y/o F patient with PMHx of liver cirrhosis unknown etiology, presents to ED c/o epigastric and periumbilical abd pain x tonight. Patient describes pain as 9 out 10 in intensity, and notes that pain is associated with vomiting, diarrhea. Patient notes that onset of abdominal pain started after eating chinese seafood. Patient denies fever, chills, shortness of breath, cough, chest pain, palpitations, dysuria, urinary frequency, hematuria, back pain, or any other complaints. NKDA.

## 2018-07-28 NOTE — ED PROVIDER NOTE - CHPI ED SYMPTOMS NEG
no fever, no chills, no shortness of breath, no cough, no chest pain, no palpitations, no dysuria, no urinary frequency, no hematuria, no back pain

## 2018-07-29 ENCOUNTER — INPATIENT (INPATIENT)
Facility: HOSPITAL | Age: 63
LOS: 4 days | Discharge: HOME | End: 2018-08-03
Attending: HOSPITALIST | Admitting: HOSPITALIST
Payer: MEDICAID

## 2018-07-29 VITALS
HEART RATE: 77 BPM | TEMPERATURE: 98 F | SYSTOLIC BLOOD PRESSURE: 115 MMHG | RESPIRATION RATE: 14 BRPM | DIASTOLIC BLOOD PRESSURE: 58 MMHG | OXYGEN SATURATION: 100 %

## 2018-07-29 VITALS
SYSTOLIC BLOOD PRESSURE: 147 MMHG | OXYGEN SATURATION: 100 % | HEART RATE: 72 BPM | TEMPERATURE: 98 F | DIASTOLIC BLOOD PRESSURE: 87 MMHG | RESPIRATION RATE: 16 BRPM

## 2018-07-29 DIAGNOSIS — K85.90 ACUTE PANCREATITIS WITHOUT NECROSIS OR INFECTION, UNSPECIFIED: ICD-10-CM

## 2018-07-29 DIAGNOSIS — K74.60 UNSPECIFIED CIRRHOSIS OF LIVER: ICD-10-CM

## 2018-07-29 DIAGNOSIS — R74.8 ABNORMAL LEVELS OF OTHER SERUM ENZYMES: ICD-10-CM

## 2018-07-29 DIAGNOSIS — K80.50 CALCULUS OF BILE DUCT WITHOUT CHOLANGITIS OR CHOLECYSTITIS WITHOUT OBSTRUCTION: ICD-10-CM

## 2018-07-29 DIAGNOSIS — D69.6 THROMBOCYTOPENIA, UNSPECIFIED: ICD-10-CM

## 2018-07-29 DIAGNOSIS — I87.8 OTHER SPECIFIED DISORDERS OF VEINS: ICD-10-CM

## 2018-07-29 DIAGNOSIS — D68.9 COAGULATION DEFECT, UNSPECIFIED: ICD-10-CM

## 2018-07-29 DIAGNOSIS — Z29.9 ENCOUNTER FOR PROPHYLACTIC MEASURES, UNSPECIFIED: ICD-10-CM

## 2018-07-29 DIAGNOSIS — D64.9 ANEMIA, UNSPECIFIED: ICD-10-CM

## 2018-07-29 DIAGNOSIS — K80.20 CALCULUS OF GALLBLADDER WITHOUT CHOLECYSTITIS WITHOUT OBSTRUCTION: ICD-10-CM

## 2018-07-29 LAB
ALBUMIN SERPL ELPH-MCNC: 1.9 G/DL — LOW (ref 3.5–5)
ALP SERPL-CCNC: 215 U/L — HIGH (ref 40–120)
ALT FLD-CCNC: 24 U/L DA — SIGNIFICANT CHANGE UP (ref 10–60)
ANION GAP SERPL CALC-SCNC: 5 MMOL/L — SIGNIFICANT CHANGE UP (ref 5–17)
APTT BLD: 34.9 SEC — SIGNIFICANT CHANGE UP (ref 27.5–37.4)
AST SERPL-CCNC: 61 U/L — HIGH (ref 10–40)
BASOPHILS # BLD AUTO: 0.1 K/UL — SIGNIFICANT CHANGE UP (ref 0–0.2)
BASOPHILS NFR BLD AUTO: 1.3 % — SIGNIFICANT CHANGE UP (ref 0–2)
BILIRUB SERPL-MCNC: 2.1 MG/DL — HIGH (ref 0.2–1.2)
BUN SERPL-MCNC: 21 MG/DL — HIGH (ref 7–18)
CALCIUM SERPL-MCNC: 7.9 MG/DL — LOW (ref 8.4–10.5)
CHLORIDE SERPL-SCNC: 108 MMOL/L — SIGNIFICANT CHANGE UP (ref 96–108)
CK MB BLD-MCNC: 1.7 % — SIGNIFICANT CHANGE UP (ref 0–3.5)
CK MB CFR SERPL CALC: 1.8 NG/ML — SIGNIFICANT CHANGE UP (ref 0–3.6)
CK SERPL-CCNC: 103 U/L — SIGNIFICANT CHANGE UP (ref 21–215)
CO2 SERPL-SCNC: 25 MMOL/L — SIGNIFICANT CHANGE UP (ref 22–31)
CREAT SERPL-MCNC: 0.85 MG/DL — SIGNIFICANT CHANGE UP (ref 0.5–1.3)
EOSINOPHIL # BLD AUTO: 0.1 K/UL — SIGNIFICANT CHANGE UP (ref 0–0.5)
EOSINOPHIL NFR BLD AUTO: 1.1 % — SIGNIFICANT CHANGE UP (ref 0–6)
GLUCOSE SERPL-MCNC: 119 MG/DL — HIGH (ref 70–99)
HCT VFR BLD CALC: 32.9 % — LOW (ref 34.5–45)
HGB BLD-MCNC: 10.5 G/DL — LOW (ref 11.5–15.5)
HIV 1 & 2 AB SERPL IA.RAPID: SIGNIFICANT CHANGE UP
INR BLD: 1.71 RATIO — HIGH (ref 0.88–1.16)
LACTATE SERPL-SCNC: 1.3 MMOL/L — SIGNIFICANT CHANGE UP (ref 0.7–2)
LIDOCAIN IGE QN: 6081 U/L — HIGH (ref 73–393)
LYMPHOCYTES # BLD AUTO: 0.7 K/UL — LOW (ref 1–3.3)
LYMPHOCYTES # BLD AUTO: 13.1 % — SIGNIFICANT CHANGE UP (ref 13–44)
MCHC RBC-ENTMCNC: 30.6 PG — SIGNIFICANT CHANGE UP (ref 27–34)
MCHC RBC-ENTMCNC: 31.8 GM/DL — LOW (ref 32–36)
MCV RBC AUTO: 96.3 FL — SIGNIFICANT CHANGE UP (ref 80–100)
MONOCYTES # BLD AUTO: 0.4 K/UL — SIGNIFICANT CHANGE UP (ref 0–0.9)
MONOCYTES NFR BLD AUTO: 6.5 % — SIGNIFICANT CHANGE UP (ref 2–14)
NEUTROPHILS # BLD AUTO: 4.2 K/UL — SIGNIFICANT CHANGE UP (ref 1.8–7.4)
NEUTROPHILS NFR BLD AUTO: 77.9 % — HIGH (ref 43–77)
PLATELET # BLD AUTO: 76 K/UL — LOW (ref 150–400)
POTASSIUM SERPL-MCNC: 4.5 MMOL/L — SIGNIFICANT CHANGE UP (ref 3.5–5.3)
POTASSIUM SERPL-SCNC: 4.5 MMOL/L — SIGNIFICANT CHANGE UP (ref 3.5–5.3)
PROT SERPL-MCNC: 8.4 G/DL — HIGH (ref 6–8.3)
PROTHROM AB SERPL-ACNC: 18.8 SEC — HIGH (ref 9.8–12.7)
RBC # BLD: 3.42 M/UL — LOW (ref 3.8–5.2)
RBC # FLD: 17.1 % — HIGH (ref 10.3–14.5)
SODIUM SERPL-SCNC: 138 MMOL/L — SIGNIFICANT CHANGE UP (ref 135–145)
TROPONIN I SERPL-MCNC: <0.015 NG/ML — SIGNIFICANT CHANGE UP (ref 0–0.04)
WBC # BLD: 5.4 K/UL — SIGNIFICANT CHANGE UP (ref 3.8–10.5)
WBC # FLD AUTO: 5.4 K/UL — SIGNIFICANT CHANGE UP (ref 3.8–10.5)

## 2018-07-29 PROCEDURE — 82550 ASSAY OF CK (CPK): CPT

## 2018-07-29 PROCEDURE — 85610 PROTHROMBIN TIME: CPT

## 2018-07-29 PROCEDURE — 80053 COMPREHEN METABOLIC PANEL: CPT

## 2018-07-29 PROCEDURE — 74177 CT ABD & PELVIS W/CONTRAST: CPT

## 2018-07-29 PROCEDURE — 96374 THER/PROPH/DIAG INJ IV PUSH: CPT | Mod: XU

## 2018-07-29 PROCEDURE — 86703 HIV-1/HIV-2 1 RESULT ANTBDY: CPT

## 2018-07-29 PROCEDURE — 83690 ASSAY OF LIPASE: CPT

## 2018-07-29 PROCEDURE — 85730 THROMBOPLASTIN TIME PARTIAL: CPT

## 2018-07-29 PROCEDURE — 74177 CT ABD & PELVIS W/CONTRAST: CPT | Mod: 26

## 2018-07-29 PROCEDURE — 99223 1ST HOSP IP/OBS HIGH 75: CPT | Mod: GC

## 2018-07-29 PROCEDURE — 85027 COMPLETE CBC AUTOMATED: CPT

## 2018-07-29 PROCEDURE — 96375 TX/PRO/DX INJ NEW DRUG ADDON: CPT

## 2018-07-29 PROCEDURE — 99285 EMERGENCY DEPT VISIT HI MDM: CPT | Mod: 25

## 2018-07-29 PROCEDURE — 84484 ASSAY OF TROPONIN QUANT: CPT

## 2018-07-29 PROCEDURE — 99222 1ST HOSP IP/OBS MODERATE 55: CPT

## 2018-07-29 PROCEDURE — 82553 CREATINE MB FRACTION: CPT

## 2018-07-29 PROCEDURE — 76705 ECHO EXAM OF ABDOMEN: CPT

## 2018-07-29 PROCEDURE — 83605 ASSAY OF LACTIC ACID: CPT

## 2018-07-29 RX ORDER — SODIUM CHLORIDE 9 MG/ML
1000 INJECTION INTRAMUSCULAR; INTRAVENOUS; SUBCUTANEOUS ONCE
Qty: 0 | Refills: 0 | Status: COMPLETED | OUTPATIENT
Start: 2018-07-29 | End: 2018-07-29

## 2018-07-29 RX ORDER — PIPERACILLIN AND TAZOBACTAM 4; .5 G/20ML; G/20ML
3.38 INJECTION, POWDER, LYOPHILIZED, FOR SOLUTION INTRAVENOUS EVERY 8 HOURS
Qty: 0 | Refills: 0 | Status: DISCONTINUED | OUTPATIENT
Start: 2018-07-29 | End: 2018-08-03

## 2018-07-29 RX ORDER — SODIUM CHLORIDE 9 MG/ML
1000 INJECTION, SOLUTION INTRAVENOUS
Qty: 0 | Refills: 0 | Status: DISCONTINUED | OUTPATIENT
Start: 2018-07-29 | End: 2018-07-29

## 2018-07-29 RX ORDER — SODIUM CHLORIDE 9 MG/ML
1000 INJECTION, SOLUTION INTRAVENOUS
Qty: 0 | Refills: 0 | Status: DISCONTINUED | OUTPATIENT
Start: 2018-07-29 | End: 2018-07-31

## 2018-07-29 RX ORDER — PIPERACILLIN AND TAZOBACTAM 4; .5 G/20ML; G/20ML
3.38 INJECTION, POWDER, LYOPHILIZED, FOR SOLUTION INTRAVENOUS ONCE
Qty: 0 | Refills: 0 | Status: COMPLETED | OUTPATIENT
Start: 2018-07-29 | End: 2018-07-29

## 2018-07-29 RX ORDER — MORPHINE SULFATE 50 MG/1
2 CAPSULE, EXTENDED RELEASE ORAL ONCE
Qty: 0 | Refills: 0 | Status: DISCONTINUED | OUTPATIENT
Start: 2018-07-29 | End: 2018-07-29

## 2018-07-29 RX ADMIN — MORPHINE SULFATE 4 MILLIGRAM(S): 50 CAPSULE, EXTENDED RELEASE ORAL at 00:13

## 2018-07-29 RX ADMIN — FAMOTIDINE 20 MILLIGRAM(S): 10 INJECTION INTRAVENOUS at 00:12

## 2018-07-29 RX ADMIN — PIPERACILLIN AND TAZOBACTAM 25 GRAM(S): 4; .5 INJECTION, POWDER, LYOPHILIZED, FOR SOLUTION INTRAVENOUS at 21:21

## 2018-07-29 RX ADMIN — ONDANSETRON 4 MILLIGRAM(S): 8 TABLET, FILM COATED ORAL at 00:13

## 2018-07-29 RX ADMIN — Medication 30 MILLILITER(S): at 00:12

## 2018-07-29 RX ADMIN — MORPHINE SULFATE 4 MILLIGRAM(S): 50 CAPSULE, EXTENDED RELEASE ORAL at 01:38

## 2018-07-29 RX ADMIN — SODIUM CHLORIDE 1000 MILLILITER(S): 9 INJECTION INTRAMUSCULAR; INTRAVENOUS; SUBCUTANEOUS at 09:03

## 2018-07-29 RX ADMIN — PIPERACILLIN AND TAZOBACTAM 200 GRAM(S): 4; .5 INJECTION, POWDER, LYOPHILIZED, FOR SOLUTION INTRAVENOUS at 04:05

## 2018-07-29 RX ADMIN — SODIUM CHLORIDE 1000 MILLILITER(S): 9 INJECTION INTRAMUSCULAR; INTRAVENOUS; SUBCUTANEOUS at 08:03

## 2018-07-29 RX ADMIN — SODIUM CHLORIDE 100 MILLILITER(S): 9 INJECTION, SOLUTION INTRAVENOUS at 13:01

## 2018-07-29 RX ADMIN — PIPERACILLIN AND TAZOBACTAM 25 GRAM(S): 4; .5 INJECTION, POWDER, LYOPHILIZED, FOR SOLUTION INTRAVENOUS at 13:48

## 2018-07-29 RX ADMIN — SODIUM CHLORIDE 100 MILLILITER(S): 9 INJECTION, SOLUTION INTRAVENOUS at 11:42

## 2018-07-29 RX ADMIN — LIDOCAINE 10 MILLILITER(S): 4 CREAM TOPICAL at 00:12

## 2018-07-29 NOTE — H&P ADULT - NSHPREVIEWOFSYSTEMS_GEN_ALL_CORE
CONSTITUTIONAL: No fevers   EYES/ENT: No visual changes;  No  throat pain   NECK: No pain   RESPIRATORY: No cough, wheezing, hemoptysis; No shortness of breath  CARDIOVASCULAR: No chest pain or palpitations  GASTROINTESTINAL: + abdominal. + nausea, +diarrhea. No vomiting, or hematemesis; . No melena or hematochezia.  GENITOURINARY: No dysuria, frequency or hematuria  NEUROLOGICAL: No numbness or weakness  SKIN: No itching, rashes

## 2018-07-29 NOTE — H&P ADULT - PROBLEM SELECTOR PLAN 3
- Abdominal pain, elevated lipase to 6000s. No CT evidence of acute pancreatitis   - Likely gallstone pancreatitis given gallstones on imaging.  alcohol use, no new medications, no hypercalcemia  - s/p IVF in the ED  - Continue LR at 100cc/hr   - Continue IVF to prevent  complications of pancreatitis  - Pain control  - Follow up lipid panel to r/o hypertriglyceridemia as a potential cause for pancreatitis. - Abdominal pain, elevated lipase to 6000s. No CT evidence of acute pancreatitis   - Likely gallstone pancreatitis given gallstones on imaging.  alcohol use, no new medications, no hypercalcemia  - s/p IVF in the ED  - Continue LR at 100cc/hr   - Continue IVF to prevent  complications of pancreatitis  - Pain control  - Surgery consulted for possible cholecystectomy   - Follow up lipid panel to r/o hypertriglyceridemia as a potential cause for pancreatitis.

## 2018-07-29 NOTE — CONSULT NOTE ADULT - ASSESSMENT
63F with Lee[place] class C cirrhosis presents with gallstone pancreatitis and acute cholecystitis    -patient not candidate for surgical intervention at this time  -would recommend MRCP to assess ducts prior to subjecting patient to anesthesia required for ERCP  -antibiotics and NPO for acute cholecystitis, see how patient does clinically  -if no improvement, may be a candidate for IR cholecystostomy tube in the future  -discussed with Dr. Carrington  -page [phone] with surgical questions/ issues

## 2018-07-29 NOTE — H&P ADULT - ASSESSMENT
63F with h/o HCV, liver cirrhosis who presents with severe epigastric/RUQ abdominal pain, associated with nausea. Transferred from OSH for possible ERCP for choledocholithiasis US/CT revealed distended GB with gallstones and dilated CBD to 9 mm. Lipase 6000s.

## 2018-07-29 NOTE — H&P ADULT - NSHPPHYSICALEXAM_GEN_ALL_CORE
VS: BP , HR , RR 18, SpO2 100% while breathing ambient air  GENERAL: NAD  HEAD:  Atraumatic, Normocephalic  EYES: EOMI, conjunctiva and sclera clear  NECK: Supple, No JVD  CHEST/LUNG: Coarse breath sound; No wheeze  HEART: Regular rate and rhythm; No murmurs, rubs, or gallops  ABDOMEN: Soft, +tender to palpation, No rebound tenderness Nondistended; Bowel sounds present. No guarding   EXTREMITIES:  2+ Peripheral Pulses. 1+ LE edema, venous stasis   PSYCH: AAOx3  NEUROLOGY: non-focal  SKIN: No rashes or lesions VS: BP , HR , RR 18, SpO2 100% while breathing ambient air  GENERAL: NAD  HEAD:  Atraumatic, Normocephalic  EYES: EOMI, conjunctiva and sclera clear  NECK: Supple, No JVD  CHEST/LUNG: Coarse breath sound; No wheeze  HEART: Regular rate and rhythm; No murmurs, rubs, or gallops  ABDOMEN: Soft, +tender to palpation, No rebound tenderness Nondistended; Bowel sounds present. No guarding   EXTREMITIES:  2+ Peripheral Pulses. 1+ LE edema, venous stasis with calf tenderness   PSYCH: AAOx3  NEUROLOGY: non-focal  SKIN: No rashes or lesions

## 2018-07-29 NOTE — ED ADULT NURSE NOTE - CHIEF COMPLAINT QUOTE
Pt is transfer from [facility] for gallstones. Pt originally presented for abdominal pain with nausea, vomiting, and diarrhea. Pt has bilateral LE edema. Pt c/o 10/10 pain, appears comfortable. Received with 20G IV access in right AC. PMH Hep C, liver cirrhosis

## 2018-07-29 NOTE — H&P ADULT - NSHPLABSRESULTS_GEN_ALL_CORE
10.5   5.4   )-----------( 76       ( 29 Jul 2018 00:04 )             32.9     29 Jul 2018 00:04    138    |  108    |  21     ----------------------------<  119    4.5     |  25     |  0.85     Ca    7.9        29 Jul 2018 00:04    TPro  8.4    /  Alb  1.9    /  TBili  2.1    /  DBili  x      /  AST  61     /  ALT  24     /  AlkPhos  215    29 Jul 2018 00:04    LIVER FUNCTIONS - ( 29 Jul 2018 00:04 )  Alb: 1.9 g/dL / Pro: 8.4 g/dL / ALK PHOS: 215 U/L / ALT: 24 U/L DA / AST: 61 U/L / GGT: x           PT/INR - ( 29 Jul 2018 00:04 )   PT: 18.8 sec;   INR: 1.71 ratio         PTT - ( 29 Jul 2018 00:04 )  PTT:34.9 sec  CAPILLARY BLOOD GLUCOSE        CARDIAC MARKERS ( 29 Jul 2018 00:04 )  <0.015 ng/mL / x     / 103 U/L / x     / 1.8 ng/mL

## 2018-07-29 NOTE — ED ADULT NURSE NOTE - OBJECTIVE STATEMENT
pt a&o x3, Slovak speaking, c./o abdominal pain since last night accompanied by n/v/d, is a transfer from . denies fevers. was told by  that she had "a stone in liver and was sent here to take it out." presents with b/l le edema, as per pt it is "cellulitis," completed antibx but unsure of name. pt appears comfortable. md at bedside. right ac 20g in place from previous facility. nad noted will monitor

## 2018-07-29 NOTE — H&P ADULT - PROBLEM SELECTOR PLAN 4
- MELD-Na 16  - Varices: unknown  - Coagulopathy: elevated INR, low platelet  - Continue rifaximin, hold diuretics. Hold lactulose for now in the setting of loose stool.  - Get hepatology consult - Likely secondary to chronic hepatitis  - Complicated by coagulopathy, hypoalbuminemia, thrombocytopenia  - MELD-Na 16  - Varices: unknown  - Coagulopathy: elevated INR, low platelet  - Continue rifaximin, hold diuretics. Hold lactulose for now in the setting of loose stool.  - Patient will benefit from upper endoscopy.   - Get hepatology consult in the AM

## 2018-07-29 NOTE — H&P ADULT - PROBLEM SELECTOR PLAN 8
- Chronic anemia.  - No evidence of bleeding  - Monitor Hb - Follow up LE DVT study in the setting of calf tenderness

## 2018-07-29 NOTE — ED ADULT NURSE REASSESSMENT NOTE - NS ED NURSE REASSESS COMMENT FT1
pt resting comfortably, pt for transfer to [facility] ER awaiting tranfer ed observation continues.

## 2018-07-29 NOTE — H&P ADULT - HISTORY OF PRESENT ILLNESS
INCOMPLETE  63F with h/o HCV, liver cirrhosis who presents with severe epigastric/RUQ abdominal pain. Patient initially presented to OSH yesterday with 9/10 abdominal pain radiating to the back and  associated with nausea and diarrhea. Patient notes that onset of abdominal pain started after eating chinese seafood. US/CT revealed distended GB with gallstones and dilated CBD to 9 mm. 63F with h/o HCV (RNA negative), liver cirrhosis who presents with severe epigastric/RUQ abdominal pain. Symptom started yesterday 9/10 abdominal pain radiating to the back and associated with nausea and diarrhea. Pain was aching in quality. Pain worse with food and movement, better with rest. Patient initially presented to OSH yesterday when US/CT revealed distended GB with gallstones, distended gallbladder and dilated CBD to 9 mm, elevated liver enzymes and Lipase in the 6000s. She was given IVF and Zosyn then transferred here for possible ERCP.     ED course:  - VS: /58, HR 77, RR 16, afebrile, SpO2 100% on RA  - Labs: CMP, CBC, Coags  - Given Zosyn, IVF

## 2018-07-29 NOTE — H&P ADULT - PROBLEM SELECTOR PLAN 5
- No evidence of bleeding   - Elevated INR likely secondary to cirrhosis  - Monitor coags and signs of bleeding

## 2018-07-29 NOTE — ED PROVIDER NOTE - OBJECTIVE STATEMENT
62 y/o F with h/o cirrhosis, Hep C transferred from [facility] for choledocholithiasis.  Pt reports developing epigastric pain last night associated with nausea and vomiting.  Also with 2 episodes of soft stool.  Pt found to have choledocholithiasis with CBD measuring 9mm, lipase elevated.  Pt received a dose ao zosyn prior to transfer and sent [facility] for GI evaluation and ERCP.  Pt reports pain improved after morphine.  No current complaints.    Of note, pt reports recent RLE cellulitis, recently completed a course of oral abx.     #[id number]

## 2018-07-29 NOTE — CONSULT NOTE ADULT - ASSESSMENT
63F with h/o HCV (RNA negative with liver cirrhosis who presents with severe epigastric/RUQ abdominal pain which she says started about 1 year ago found to have acute pancreatitis.    1) RUQ pain  Patiet presenting with RUQ pain (acute on chronic) with lipase of >6000 with abdominal pain but without CT evidence of pancreatitis. CBD of 9mm but no evidence of stone. US showing acute cholecystitis that was not seen on CT. Patient with no WBC and fever.  DDx: Acute pancreatitis and/or choledocholithiasis vs. acute cholecystitis vs. acute cholangitis vs. biliary colic  2) Hep C cirrhosis  VL undetected in 2/2018. Now with cirrhosis  Ascites: none seen on imaging  Varices: Patient reports recent EGD which was normal but CT showing perisplenic varices and evidence of portal HTN  HCC: CT showing subcentimeter focus near the dome  MELD Na 16  3) Diarrhea  Patient reporting 1 year of diarrhea (3x per day)  DDx: infectious etiology vs. malabsorption vs. IBS vs. IBD vs. celiacs disease    - please obtain MRCP to evaluate biliary tree  - please trend CBC, CMP, INR daily  - please obtain OSH EGD report  - please send stool studies GI PCR panel and Cdiff for diarrhea  - please check TTG IGA 63F with h/o HCV (RNA negative with liver cirrhosis who presents with severe epigastric/RUQ abdominal pain which she says started about 1 year ago found to have acute pancreatitis.    1) RUQ pain  Patiet presenting with RUQ pain (acute on chronic) with lipase of >6000 with abdominal pain but without CT evidence of pancreatitis. CBD of 9mm but no evidence of stone. US showing acute cholecystitis that was not seen on CT. Patient with no WBC and fever.  DDx: Acute pancreatitis and/or choledocholithiasis vs. acute cholecystitis vs. acute cholangitis vs. biliary colic  2) Hep C cirrhosis  VL undetected in 2/2018. Now with cirrhosis  Ascites: none seen on imaging  Varices: Patient reports recent EGD which was normal but CT showing perisplenic varices and evidence of portal HTN  HCC: CT showing subcentimeter focus near the dome  MELD Na 16  3) Diarrhea  Patient reporting 1 year of diarrhea (3x per day)  DDx: infectious etiology vs. malabsorption vs. IBS vs. IBD vs. celiacs disease    - please obtain MRCP to evaluate biliary tree  - please trend CBC, CMP, INR daily  - please obtain OSH EGD report  - please send stool studies GI PCR panel and Cdiff for diarrhea  - please check TTG IGA  - continue IV abx as per primary team 63F with h/o HCV (RNA negative with liver cirrhosis who presents with severe epigastric/RUQ abdominal pain which she says started about 1 year ago found to have acute pancreatitis.    1) RUQ pain  Patiet presenting with RUQ pain (acute on chronic) with lipase of >6000 with abdominal pain but without CT evidence of pancreatitis. CBD of 9mm but no evidence of stone. US showing acute cholecystitis that was not seen on CT. Patient with no WBC and fever.  DDx: Acute pancreatitis and/or choledocholithiasis vs. acute cholecystitis vs. acute cholangitis  2) Hep C cirrhosis  VL undetected in 2/2018. Now with cirrhosis  Ascites: none seen on imaging  Varices: Patient reports recent EGD which was normal but CT showing perisplenic varices and evidence of portal HTN  HCC: CT showing subcentimeter focus near the dome  MELD Na 16  3) Diarrhea  Patient reporting 1 year of diarrhea (3x per day)  DDx: infectious etiology vs. malabsorption vs. IBS vs. IBD vs. celiacs disease    - please obtain MRCP to evaluate biliary tree  - please trend CBC, CMP, INR daily  - please obtain OSH EGD report  - please send stool studies GI PCR panel and Cdiff for diarrhea  - please check TTG IGA  - continue IV abx as per primary team

## 2018-07-29 NOTE — H&P ADULT - PROBLEM SELECTOR PLAN 7
- Chronic. 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

## 2018-07-29 NOTE — H&P ADULT - PROBLEM SELECTOR PLAN 1
- US abdomen and CT abdomen/pelvis with gallstones, distended gallbladder and ?dilated CBD to 0.9mm  - Pain control with Morphine PRN  - Trend WBC, liver enzymes, bilirubin, coags   - GI consulted via email for possible ERCP to confirm the diagnosis and provide biliary drainage  - NPO for now. Follow up GI recommendations  - Continue Zosyn and IVF - US abdomen and CT abdomen/pelvis with gallstones, distended gallbladder and ?dilated CBD to 0.9mm  - Pain control with Morphine PRN  - Trend WBC, liver enzymes, bilirubin, coags   - GI consulted via email for possible ERCP to confirm the diagnosis and provide biliary drainage  - NPO for now. Follow up GI recommendations  - Surgery consulted for possible cholecystectomy   - Continue Zosyn and IVF - US abdomen and CT abdomen/pelvis with gallstones, distended gallbladder and ?dilated CBD to 0.9mm  - Pain control with Morphine PRN  - Trend WBC, liver enzymes, bilirubin, coags   - GI consulted via email for MRCP/ERCP to confirm the diagnosis and provide biliary drainage  - NPO for now. Follow up GI recommendations  - Surgery consulted for possible cholecystectomy   - Continue Zosyn and IVF

## 2018-07-29 NOTE — ED ADULT TRIAGE NOTE - CHIEF COMPLAINT QUOTE
Pt is transfer from [facility] for gallstones. Pt originally presented for abdominal pain with nausea, vomiting, and diarrhea. Pt has bilateral LE edema. Pt c/o 10/10 pain, appears comfortable. PMH Hep C, liver cirrhosis Pt is transfer from [facility] for gallstones. Pt originally presented for abdominal pain with nausea, vomiting, and diarrhea. Pt has bilateral LE edema. Pt c/o 10/10 pain, appears comfortable. Received with 20G IV access in right AC. PMH Hep C, liver cirrhosis

## 2018-07-29 NOTE — ED ADULT NURSE REASSESSMENT NOTE - NS ED NURSE REASSESS COMMENT FT1
pt ambulated to bathroom without assistance. LR infusing well, with IVPB Zosyn.
pt ambulated to bathroom to wash up, gait steady able to walk no assistance.
pt resting, c/o abdominal pain, but states she does not want any medication. abdomen soft and nontender. respirations equal and non labored. v/s as noted.  bed assigned, pending report.
report receiving from night RN, pt c/o 10/10 generalized abdominal pain. abdomen soft and non tender. respirations equal and non labored.
admitting team at bedside, v/s as noted. assessment as noted. pt endorses worsening LLE edema, and pain to BLE when palpated. pt resting, LR infusing well, patient appears in no acute distress.

## 2018-07-29 NOTE — H&P ADULT - ATTENDING COMMENTS
Patient seen and examined. Chart/lab reviewed. Agree with above with modifications.  63 y/o Kyrgyz speaking female with h/o HCV (RNA neg), liver cirrhosis (on rifaximin, lactulose, aldactone and lasix), recent completed abx for RLE cellulitis, who presents with severe epigastric/RUQ abdominal pain, associated with nausea, no vomiting, loose stool.   Imaging (US/CT) revealed distended GB with gallstones and dilated CBD to 9 mm.   Labs notable for lipase 6081, chronic anemia Hgb 10.5/thrombocytopenia plts 76, hypoalbuminemia (albumin 1.9)    PE: clear lungs, heart regular, abdomen soft, epigastric>RUQ tenderness, extrem: 1+ peripheral edema    Assessment/plan:   # Cholelithiasis, gallstone pancreatitis and ?cholecystitis, likely choledocholithiasis with dilated CBD:   -NPO, IVF LR, TTE to evaluate LVEF  -consult GI for ERCP and surgery for eventual cholecystectomy when medically optimized  -iv zosyn    # Liver cirrhosis, HCV + (RNA neg): cont rifaximin, hold lactulose for now in the setting of loose stool, hold diuretic for now   # Chronic anemia and thrombocytopenia likely due to cirrhosis: monitor CBC, no indication for transfusion

## 2018-07-29 NOTE — CONSULT NOTE ADULT - SUBJECTIVE AND OBJECTIVE BOX
Chief Complaint:  Patient is a 63y old  Female who presents with a chief complaint of ?Choledocholithiasis (29 Jul 2018 12:22)      HPI:    Allergies:  No Known Allergies      Home Medications:    Hospital Medications:  lactated ringers. 1000 milliLiter(s) IV Continuous <Continuous>  morphine  - Injectable 2 milliGRAM(s) IV Push once  piperacillin/tazobactam IVPB. 3.375 Gram(s) IV Intermittent every 8 hours  rifaximin 550 milliGRAM(s) Oral two times a day      PMHX/PSHX:  Edema of both legs  Cirrhosis  Bronchitis  Hepatitis C  S/P hysterectomy      Family history:  No pertinent family history in first degree relatives      Social History:     ROS:     General:  No wt loss, fevers, chills, night sweats, fatigue,   Eyes:  Good vision, no reported pain  ENT:  No sore throat, pain, runny nose, dysphagia  CV:  No pain, palpitations, hypo/hypertension  Resp:  No dyspnea, cough, tachypnea, wheezing  GI:  See HPI  :  No pain, bleeding, incontinence, nocturia  Muscle:  No pain, weakness  Neuro:  No weakness, tingling, memory problems  Psych:  No fatigue, insomnia, mood problems, depression  Endocrine:  No polyuria, polydipsia, cold/heat intolerance  Heme:  No petechiae, ecchymosis, easy bruisability  Skin:  No rash, edema      PHYSICAL EXAM:     GENERAL:  Appears stated age, well-groomed, well-nourished, no distress  HEENT:  NC/AT,  conjunctivae clear and pink,  no JVD  CHEST:  Full & symmetric excursion, no increased effort, breath sounds clear  HEART:  Regular rhythm, S1, S2, no murmur/rub/S3/S4, no abdominal bruit, no edema  ABDOMEN:  Soft, non-tender, non-distended, normoactive bowel sounds,  no masses ,  EXTREMITIES:  no cyanosis,clubbing or edema  SKIN:  No rash/erythema/ecchymoses/petechiae/wounds/abscess/warm/dry  NEURO:  Alert, oriented    Vital Signs:  Vital Signs Last 24 Hrs  T(C): 36.6 (29 Jul 2018 08:42), Max: 36.7 (29 Jul 2018 05:11)  T(F): 97.8 (29 Jul 2018 08:42), Max: 98 (29 Jul 2018 05:11)  HR: 78 (29 Jul 2018 15:18) (72 - 81)  BP: 113/56 (29 Jul 2018 15:18) (112/45 - 168/76)  BP(mean): --  RR: 16 (29 Jul 2018 15:18) (14 - 20)  SpO2: 100% (29 Jul 2018 15:18) (100% - 100%)  Daily     Daily     LABS:                        10.5   5.4   )-----------( 76       ( 29 Jul 2018 00:04 )             32.9     07-29    138  |  108  |  21<H>  ----------------------------<  119<H>  4.5   |  25  |  0.85    Ca    7.9<L>      29 Jul 2018 00:04    TPro  8.4<H>  /  Alb  1.9<L>  /  TBili  2.1<H>  /  DBili  x   /  AST  61<H>  /  ALT  24  /  AlkPhos  215<H>  07-29    LIVER FUNCTIONS - ( 29 Jul 2018 00:04 )  Alb: 1.9 g/dL / Pro: 8.4 g/dL / ALK PHOS: 215 U/L / ALT: 24 U/L DA / AST: 61 U/L / GGT: x           PT/INR - ( 29 Jul 2018 00:04 )   PT: 18.8 sec;   INR: 1.71 ratio         PTT - ( 29 Jul 2018 00:04 )  PTT:34.9 sec    Amylase Serum--      Lipase [id number]       Ammonia--      Imaging: Chief Complaint:  Patient is a 63y old  Female who presents with a chief complaint of ?Choledocholithiasis (29 Jul 2018 12:22)      HPI:  63F with h/o HCV (RNA negative), liver cirrhosis who presents with severe epigastric/RUQ abdominal pain. Symptom started yesterday 9/10 abdominal pain radiating to the back and associated with nausea and diarrhea. Pain was aching in quality. Pain worse with food and movement, better with rest. Patient initially presented to OSH yesterday when US/CT revealed distended GB with gallstones, distended gallbladder and dilated CBD to 9 mm, elevated liver enzymes and Lipase in the 6000s. She was given IVF and Zosyn then transferred here for possible ERCP.     ED course:  - VS: /58, HR 77, RR 16, afebrile, SpO2 100% on RA  - Labs: CMP, CBC, Coags  - Given Zosyn, IVF    Allergies:  No Known Allergies      Home Medications:    Hospital Medications:  lactated ringers. 1000 milliLiter(s) IV Continuous <Continuous>  morphine  - Injectable 2 milliGRAM(s) IV Push once  piperacillin/tazobactam IVPB. 3.375 Gram(s) IV Intermittent every 8 hours  rifaximin 550 milliGRAM(s) Oral two times a day      PMHX/PSHX:  Edema of both legs  Cirrhosis  Bronchitis  Hepatitis C  S/P hysterectomy      Family history:  No pertinent family history in first degree relatives      Social History:     ROS:     General:  No wt loss, fevers, chills, night sweats, fatigue,   Eyes:  Good vision, no reported pain  ENT:  No sore throat, pain, runny nose, dysphagia  CV:  No pain, palpitations, hypo/hypertension  Resp:  No dyspnea, cough, tachypnea, wheezing  GI:  See HPI  :  No pain, bleeding, incontinence, nocturia  Muscle:  No pain, weakness  Neuro:  No weakness, tingling, memory problems  Psych:  No fatigue, insomnia, mood problems, depression  Endocrine:  No polyuria, polydipsia, cold/heat intolerance  Heme:  No petechiae, ecchymosis, easy bruisability  Skin:  No rash, edema      PHYSICAL EXAM:     GENERAL:  Appears stated age, well-groomed, well-nourished, no distress  HEENT:  NC/AT,  conjunctivae clear and pink,  no JVD  CHEST:  Full & symmetric excursion, no increased effort, breath sounds clear  HEART:  Regular rhythm, S1, S2, no murmur/rub/S3/S4, no abdominal bruit, no edema  ABDOMEN:  Soft, non-tender, non-distended, normoactive bowel sounds,  no masses ,  EXTREMITIES:  no cyanosis,clubbing or edema  SKIN:  No rash/erythema/ecchymoses/petechiae/wounds/abscess/warm/dry  NEURO:  Alert, oriented    Vital Signs:  Vital Signs Last 24 Hrs  T(C): 36.6 (29 Jul 2018 08:42), Max: 36.7 (29 Jul 2018 05:11)  T(F): 97.8 (29 Jul 2018 08:42), Max: 98 (29 Jul 2018 05:11)  HR: 78 (29 Jul 2018 15:18) (72 - 81)  BP: 113/56 (29 Jul 2018 15:18) (112/45 - 168/76)  BP(mean): --  RR: 16 (29 Jul 2018 15:18) (14 - 20)  SpO2: 100% (29 Jul 2018 15:18) (100% - 100%)  Daily     Daily     LABS:                        10.5   5.4   )-----------( 76       ( 29 Jul 2018 00:04 )             32.9     07-29    138  |  108  |  21<H>  ----------------------------<  119<H>  4.5   |  25  |  0.85    Ca    7.9<L>      29 Jul 2018 00:04    TPro  8.4<H>  /  Alb  1.9<L>  /  TBili  2.1<H>  /  DBili  x   /  AST  61<H>  /  ALT  24  /  AlkPhos  215<H>  07-29    LIVER FUNCTIONS - ( 29 Jul 2018 00:04 )  Alb: 1.9 g/dL / Pro: 8.4 g/dL / ALK PHOS: 215 U/L / ALT: 24 U/L DA / AST: 61 U/L / GGT: x           PT/INR - ( 29 Jul 2018 00:04 )   PT: 18.8 sec;   INR: 1.71 ratio         PTT - ( 29 Jul 2018 00:04 )  PTT:34.9 sec    Amylase Serum--      Lipase [id number]       Ammonia--      Imaging: Chief Complaint:  Patient is a 63y old  Female who presents with a chief complaint of ?Choledocholithiasis (29 Jul 2018 12:22)      HPI:  63F with h/o HCV (RNA negative with liver cirrhosis who presents with severe epigastric/RUQ abdominal pain which she says started about 1 year ago found to have acute pancreatitis.    As per patient and daugther, she has had intermittent crampy aching R flank and RUQ pain for 1 year worsened with food, movement and defecation. She reports that abdominal pain radiates to her back with associated nausea and diarrhea (3 episodes per day). Denies any melena, BRBpR, vomiting.    As per daughter and patient, she was seen at [facility] about 3 weeks ago with similar but less severe abdominal pain. EGD and CTAP was done and she was told that was normal.    Yesterday, patient says her pain returned but more severe and lasting longer. She originally went to [place] where US/CT revealed distended GB with gallstones, distended gallbladder and dilated CBD to 9 mm, elevated liver enzymes and Lipase in the 6000s. She was given IVF and Zosyn then transferred here for possible ERCP.     Denies alcohol use.  She is seen at [facility] for her liver disease which she says she was followed up recently within the last month. She does not know whether she was treated for her HCV but says she was told her virus was cleared.    In the ED, vitals stable. WBC WNL. Hb of 10.5 (basleine of 12). Plts of 76. INR 1.71 Na of 138 Cr of 0.85. T bili of 2.1 Albumin 1.9 alk phos of 215 AST of 61 ALT of 24. Lipase of 6081.She was given Zosyn and IV fluids.    ED course:  - VS: /58, HR 77, RR 16, afebrile, SpO2 100% on RA  - Labs: CMP, CBC, Coags  - Given Zosyn, IVF    Allergies:  No Known Allergies      Home Medications:    Hospital Medications:  lactated ringers. 1000 milliLiter(s) IV Continuous <Continuous>  morphine  - Injectable 2 milliGRAM(s) IV Push once  piperacillin/tazobactam IVPB. 3.375 Gram(s) IV Intermittent every 8 hours  rifaximin 550 milliGRAM(s) Oral two times a day      PMHX/PSHX:  Edema of both legs  Cirrhosis  Bronchitis  Hepatitis C  S/P hysterectomy      Family history:  No pertinent family history in first degree relatives      Social History:     ROS:     General:  No wt loss, fevers, chills, night sweats, fatigue,   Eyes:  Good vision, no reported pain  ENT:  No sore throat, pain, runny nose, dysphagia  CV:  No pain, palpitations, hypo/hypertension  Resp:  No dyspnea, cough, tachypnea, wheezing  GI:  See HPI  :  No pain, bleeding, incontinence, nocturia  Muscle:  No pain, weakness  Neuro:  No weakness, tingling, memory problems  Psych:  No fatigue, insomnia, mood problems, depression  Endocrine:  No polyuria, polydipsia, cold/heat intolerance  Heme:  No petechiae, ecchymosis, easy bruisability  Skin:  No rash, edema      PHYSICAL EXAM:     GENERAL:  Appears stated age, well-groomed, well-nourished, no distress  HEENT:  NC/AT,  conjunctivae clear and pink,  no JVD  CHEST:  Full & symmetric excursion, no increased effort, breath sounds clear  HEART:  Regular rhythm, S1, S2, no murmur/rub/S3/S4, no abdominal bruit, no edema  ABDOMEN:  Soft, non-tender, non-distended, normoactive bowel sounds,  no masses ,  EXTREMITIES:  no cyanosis,clubbing or edema  SKIN:  No rash/erythema/ecchymoses/petechiae/wounds/abscess/warm/dry  NEURO:  Alert, oriented    Vital Signs:  Vital Signs Last 24 Hrs  T(C): 36.6 (29 Jul 2018 08:42), Max: 36.7 (29 Jul 2018 05:11)  T(F): 97.8 (29 Jul 2018 08:42), Max: 98 (29 Jul 2018 05:11)  HR: 78 (29 Jul 2018 15:18) (72 - 81)  BP: 113/56 (29 Jul 2018 15:18) (112/45 - 168/76)  BP(mean): --  RR: 16 (29 Jul 2018 15:18) (14 - 20)  SpO2: 100% (29 Jul 2018 15:18) (100% - 100%)  Daily     Daily     LABS:                        10.5   5.4   )-----------( 76       ( 29 Jul 2018 00:04 )             32.9     07-29    138  |  108  |  21<H>  ----------------------------<  119<H>  4.5   |  25  |  0.85    Ca    7.9<L>      29 Jul 2018 00:04    TPro  8.4<H>  /  Alb  1.9<L>  /  TBili  2.1<H>  /  DBili  x   /  AST  61<H>  /  ALT  24  /  AlkPhos  215<H>  07-29    LIVER FUNCTIONS - ( 29 Jul 2018 00:04 )  Alb: 1.9 g/dL / Pro: 8.4 g/dL / ALK PHOS: 215 U/L / ALT: 24 U/L DA / AST: 61 U/L / GGT: x           PT/INR - ( 29 Jul 2018 00:04 )   PT: 18.8 sec;   INR: 1.71 ratio         PTT - ( 29 Jul 2018 00:04 )  PTT:34.9 sec    Amylase Serum--      Lipase [id number]       Ammonia--      Imaging:  < from: CT Abdomen and Pelvis w/ Oral Cont and w/ IV Cont (07.29.18 @ 02:14) >  FINDINGS:    LUNG BASES:  There are dependent atelectatic changes at the lung bases.  PERITONEUM:  There is no free air or focal collection.  No free fluid.  LIVER: Cirrhotic. Small from small subcentimeter focus near the dome too   small tocharacterize.  SPLEEN: The spleen is enlarged, measuring 13 x 16.2 x 7.7  cm.  GALLBLADDER: Distended with gallstones.  BILIARY TREE: The CBD measures up to 9 mm in the intrapancreatic segment  PANCREAS: Normal.  ADRENAL GLANDS: Normal.  KIDNEYS: Normal.  BOWEL: The stomach is incompletely distended.  There is no small bowel   obstruction, focal bowel wall thickening or diverticulitis. The appendix   is unremarkable.    URINARY BLADDER: Incompletely distended.  PELVIC ORGANS: Hysterectomy.    There is no significant adenopathy.  VASCULATURE: Large perisplenic varices. IVC filter.  RETROPERITONEUM:  There is no mass.  BONES: Intramedullary sangita in the right proximal femoral shaft. Mild grade   1 anterolisthesis of L4 and L5.  ABDOMINAL WALL: Unremarkable.    IMPRESSION:    No focal bowel wall thickening as clinically questioned.  Cirrhotic liver with an indeterminant subcentimeter focus near the dome   may be characterized by annual surveillance MRI.  Distended gallbladder containing gallstones. The CBD is dilated up to 9   mm. Recommend correlation with liver function tests. If clinically   indicated, further characterization with MRI/MRCP may be considered.  Perisplenic varices and splenomegaly indicative of portal hypertension.   Status post hysterectomy without small bowel obstruction.    < end of copied text >    < from: US Abdomen Limited (07.28.18 @ 23:30) >    FINDINGS:  Evaluation is limited due to extensive bowel gas and limitations in   patient positioning.    The hepatic parenchyma appears unremarkable.    The gallbladder is distended. There is cholelithiasis.    The common duct measures 8 mm in diameter.    Partially visualized pancreas appears unremarkable without peripancreatic   fluid.    Right kidney is normal size without hydronephrosis.    IMPRESSION:  Limited exam. Distended gallbladder suspicious for acute cholecystitis.    Mildly dilated common bile duct, choledocholithiasis is not excluded.   This is grossly stable from the prior exam however.    < end of copied text >

## 2018-07-30 ENCOUNTER — TRANSCRIPTION ENCOUNTER (OUTPATIENT)
Age: 63
End: 2018-07-30

## 2018-07-30 DIAGNOSIS — I47.1 SUPRAVENTRICULAR TACHYCARDIA: ICD-10-CM

## 2018-07-30 LAB
ALBUMIN SERPL ELPH-MCNC: 1.8 G/DL — LOW (ref 3.3–5)
ALP SERPL-CCNC: 105 U/L — SIGNIFICANT CHANGE UP (ref 40–120)
ALT FLD-CCNC: 13 U/L — SIGNIFICANT CHANGE UP (ref 4–33)
ANISOCYTOSIS BLD QL: SLIGHT — SIGNIFICANT CHANGE UP
APTT BLD: 38.2 SEC — HIGH (ref 27.5–37.4)
AST SERPL-CCNC: 34 U/L — HIGH (ref 4–32)
BASOPHILS # BLD AUTO: 0.01 K/UL — SIGNIFICANT CHANGE UP (ref 0–0.2)
BASOPHILS NFR BLD AUTO: 0.3 % — SIGNIFICANT CHANGE UP (ref 0–2)
BILIRUB SERPL-MCNC: 2.7 MG/DL — HIGH (ref 0.2–1.2)
BLD GP AB SCN SERPL QL: NEGATIVE — SIGNIFICANT CHANGE UP
BUN SERPL-MCNC: 10 MG/DL — SIGNIFICANT CHANGE UP (ref 7–23)
CALCIUM SERPL-MCNC: 7.7 MG/DL — LOW (ref 8.4–10.5)
CHLORIDE SERPL-SCNC: 105 MMOL/L — SIGNIFICANT CHANGE UP (ref 98–107)
CHOLEST SERPL-MCNC: 69 MG/DL — LOW (ref 120–199)
CK MB BLD-MCNC: 1.76 NG/ML — SIGNIFICANT CHANGE UP (ref 1–4.7)
CK MB BLD-MCNC: SIGNIFICANT CHANGE UP (ref 0–2.5)
CK SERPL-CCNC: 65 U/L — SIGNIFICANT CHANGE UP (ref 25–170)
CO2 SERPL-SCNC: 22 MMOL/L — SIGNIFICANT CHANGE UP (ref 22–31)
CREAT SERPL-MCNC: 0.72 MG/DL — SIGNIFICANT CHANGE UP (ref 0.5–1.3)
EOSINOPHIL # BLD AUTO: 0.08 K/UL — SIGNIFICANT CHANGE UP (ref 0–0.5)
EOSINOPHIL NFR BLD AUTO: 2 % — SIGNIFICANT CHANGE UP (ref 0–6)
GLUCOSE SERPL-MCNC: 75 MG/DL — SIGNIFICANT CHANGE UP (ref 70–99)
HBA1C BLD-MCNC: 4.7 % — SIGNIFICANT CHANGE UP (ref 4–5.6)
HCT VFR BLD CALC: 30.4 % — LOW (ref 34.5–45)
HCT VFR BLD CALC: 32.9 % — LOW (ref 34.5–45)
HDLC SERPL-MCNC: 32 MG/DL — LOW (ref 45–65)
HGB BLD-MCNC: 10.5 G/DL — LOW (ref 11.5–15.5)
HGB BLD-MCNC: 9.9 G/DL — LOW (ref 11.5–15.5)
IMM GRANULOCYTES # BLD AUTO: 0.04 # — SIGNIFICANT CHANGE UP
IMM GRANULOCYTES NFR BLD AUTO: 1 % — SIGNIFICANT CHANGE UP (ref 0–1.5)
INR BLD: 1.77 — HIGH (ref 0.88–1.17)
LG PLATELETS BLD QL AUTO: SLIGHT — SIGNIFICANT CHANGE UP
LIPID PNL WITH DIRECT LDL SERPL: 36 MG/DL — SIGNIFICANT CHANGE UP
LYMPHOCYTES # BLD AUTO: 0.47 K/UL — LOW (ref 1–3.3)
LYMPHOCYTES # BLD AUTO: 11.8 % — LOW (ref 13–44)
MACROCYTES BLD QL: SLIGHT — SIGNIFICANT CHANGE UP
MAGNESIUM SERPL-MCNC: 1.6 MG/DL — SIGNIFICANT CHANGE UP (ref 1.6–2.6)
MANUAL SMEAR VERIFICATION: SIGNIFICANT CHANGE UP
MCHC RBC-ENTMCNC: 30.8 PG — SIGNIFICANT CHANGE UP (ref 27–34)
MCHC RBC-ENTMCNC: 31.5 PG — SIGNIFICANT CHANGE UP (ref 27–34)
MCHC RBC-ENTMCNC: 31.9 % — LOW (ref 32–36)
MCHC RBC-ENTMCNC: 32.6 % — SIGNIFICANT CHANGE UP (ref 32–36)
MCV RBC AUTO: 96.5 FL — SIGNIFICANT CHANGE UP (ref 80–100)
MCV RBC AUTO: 96.8 FL — SIGNIFICANT CHANGE UP (ref 80–100)
MONOCYTES # BLD AUTO: 0.35 K/UL — SIGNIFICANT CHANGE UP (ref 0–0.9)
MONOCYTES NFR BLD AUTO: 8.8 % — SIGNIFICANT CHANGE UP (ref 2–14)
NEUTROPHILS # BLD AUTO: 3.02 K/UL — SIGNIFICANT CHANGE UP (ref 1.8–7.4)
NEUTROPHILS NFR BLD AUTO: 76.1 % — SIGNIFICANT CHANGE UP (ref 43–77)
NRBC # FLD: 0 — SIGNIFICANT CHANGE UP
NRBC # FLD: 0 — SIGNIFICANT CHANGE UP
OVALOCYTES BLD QL SMEAR: SLIGHT — SIGNIFICANT CHANGE UP
PHOSPHATE SERPL-MCNC: 2.6 MG/DL — SIGNIFICANT CHANGE UP (ref 2.5–4.5)
PLATELET # BLD AUTO: 60 K/UL — LOW (ref 150–400)
PLATELET # BLD AUTO: 61 K/UL — LOW (ref 150–400)
PLATELET COUNT - ESTIMATE: SIGNIFICANT CHANGE UP
PMV BLD: 10.6 FL — SIGNIFICANT CHANGE UP (ref 7–13)
PMV BLD: 12 FL — SIGNIFICANT CHANGE UP (ref 7–13)
POTASSIUM SERPL-MCNC: 4.7 MMOL/L — SIGNIFICANT CHANGE UP (ref 3.5–5.3)
POTASSIUM SERPL-SCNC: 4.7 MMOL/L — SIGNIFICANT CHANGE UP (ref 3.5–5.3)
PROT SERPL-MCNC: 7 G/DL — SIGNIFICANT CHANGE UP (ref 6–8.3)
PROTHROM AB SERPL-ACNC: 19.9 SEC — HIGH (ref 9.8–13.1)
RBC # BLD: 3.14 M/UL — LOW (ref 3.8–5.2)
RBC # BLD: 3.41 M/UL — LOW (ref 3.8–5.2)
RBC # FLD: 17.9 % — HIGH (ref 10.3–14.5)
RBC # FLD: 18 % — HIGH (ref 10.3–14.5)
REVIEW TO FOLLOW: YES — SIGNIFICANT CHANGE UP
RH IG SCN BLD-IMP: POSITIVE — SIGNIFICANT CHANGE UP
SODIUM SERPL-SCNC: 134 MMOL/L — LOW (ref 135–145)
TRIGL SERPL-MCNC: 37 MG/DL — SIGNIFICANT CHANGE UP (ref 10–149)
TROPONIN T, HIGH SENSITIVITY: 8 NG/L — SIGNIFICANT CHANGE UP (ref ?–14)
WBC # BLD: 3.27 K/UL — LOW (ref 3.8–10.5)
WBC # BLD: 3.97 K/UL — SIGNIFICANT CHANGE UP (ref 3.8–10.5)
WBC # FLD AUTO: 3.27 K/UL — LOW (ref 3.8–10.5)
WBC # FLD AUTO: 3.97 K/UL — SIGNIFICANT CHANGE UP (ref 3.8–10.5)

## 2018-07-30 PROCEDURE — 99232 SBSQ HOSP IP/OBS MODERATE 35: CPT | Mod: GC

## 2018-07-30 PROCEDURE — 93970 EXTREMITY STUDY: CPT | Mod: 26

## 2018-07-30 PROCEDURE — 99233 SBSQ HOSP IP/OBS HIGH 50: CPT

## 2018-07-30 PROCEDURE — 93010 ELECTROCARDIOGRAM REPORT: CPT | Mod: 76

## 2018-07-30 RX ORDER — ADENOSINE 3 MG/ML
6 INJECTION INTRAVENOUS ONCE
Qty: 0 | Refills: 0 | Status: COMPLETED | OUTPATIENT
Start: 2018-07-30 | End: 2018-07-30

## 2018-07-30 RX ORDER — METOPROLOL TARTRATE 50 MG
25 TABLET ORAL
Qty: 0 | Refills: 0 | Status: DISCONTINUED | OUTPATIENT
Start: 2018-07-30 | End: 2018-07-31

## 2018-07-30 RX ORDER — MAGNESIUM SULFATE 500 MG/ML
1 VIAL (ML) INJECTION ONCE
Qty: 0 | Refills: 0 | Status: COMPLETED | OUTPATIENT
Start: 2018-07-30 | End: 2018-07-30

## 2018-07-30 RX ADMIN — SODIUM CHLORIDE 100 MILLILITER(S): 9 INJECTION, SOLUTION INTRAVENOUS at 13:34

## 2018-07-30 RX ADMIN — Medication 25 MILLIGRAM(S): at 18:30

## 2018-07-30 RX ADMIN — PIPERACILLIN AND TAZOBACTAM 25 GRAM(S): 4; .5 INJECTION, POWDER, LYOPHILIZED, FOR SOLUTION INTRAVENOUS at 13:33

## 2018-07-30 RX ADMIN — PIPERACILLIN AND TAZOBACTAM 25 GRAM(S): 4; .5 INJECTION, POWDER, LYOPHILIZED, FOR SOLUTION INTRAVENOUS at 22:19

## 2018-07-30 RX ADMIN — ADENOSINE 6 MILLIGRAM(S): 3 INJECTION INTRAVENOUS at 17:46

## 2018-07-30 RX ADMIN — PIPERACILLIN AND TAZOBACTAM 25 GRAM(S): 4; .5 INJECTION, POWDER, LYOPHILIZED, FOR SOLUTION INTRAVENOUS at 05:26

## 2018-07-30 RX ADMIN — SODIUM CHLORIDE 100 MILLILITER(S): 9 INJECTION, SOLUTION INTRAVENOUS at 22:19

## 2018-07-30 RX ADMIN — Medication 100 GRAM(S): at 18:30

## 2018-07-30 NOTE — PROGRESS NOTE ADULT - PROBLEM SELECTOR PLAN 4
- Likely secondary to chronic hepatitis, MELD-Na 16  - Complicated by coagulopathy, hypoalbuminemia, thrombocytopenia  evidence of portal HTN on CT with splenomegaly  - Continue rifaximin, holding diuretics and lactulose for now  EGD normal per patient, performed at OSH, hepatology signed off

## 2018-07-30 NOTE — PROGRESS NOTE ADULT - SUBJECTIVE AND OBJECTIVE BOX
Chief Complaint:  Patient is a 63y old  Female who presents with a chief complaint of ?Choledocholithiasis (29 Jul 2018 12:22)      Interval Events:   Patient still with abdominal pain, some improvement from admission  MRCP still pending.    Allergies:  No Known Allergies      Home Medications:    Hospital Medications:  lactated ringers. 1000 milliLiter(s) IV Continuous <Continuous>  piperacillin/tazobactam IVPB. 3.375 Gram(s) IV Intermittent every 8 hours  rifaximin 550 milliGRAM(s) Oral two times a day      PMHX/PSHX:  Edema of both legs  Cirrhosis  Bronchitis  Hepatitis C  S/P hysterectomy      Family history:  No pertinent family history in first degree relatives      ROS:     General:  No wt loss, fevers, chills, night sweats, fatigue,   Eyes:  Good vision, no reported pain  ENT:  No sore throat, pain, runny nose, dysphagia  CV:  No pain, palpitations, hypo/hypertension  Resp:  No dyspnea, cough, tachypnea, wheezing  GI:  No pain, No nausea, No vomiting, No diarrhea, No constipation, No weight loss, No fever, No pruritis, No rectal bleeding, No tarry stools, No dysphagia,  :  No pain, bleeding, incontinence, nocturia  Muscle:  No pain, weakness  Neuro:  No weakness, tingling, memory problems  Psych:  No fatigue, insomnia, mood problems, depression  Endocrine:  No polyuria, polydipsia, cold/heat intolerance  Heme:  No petechiae, ecchymosis, easy bruisability  Skin:  No rash, tattoos, scars, edema      PHYSICAL EXAM:   Vital Signs:  Vital Signs Last 24 Hrs  T(C): 37.1 (30 Jul 2018 05:32), Max: 37.1 (30 Jul 2018 05:32)  T(F): 98.8 (30 Jul 2018 05:32), Max: 98.8 (30 Jul 2018 05:32)  HR: 88 (30 Jul 2018 05:32) (75 - 88)  BP: 122/74 (30 Jul 2018 05:32) (112/45 - 128/76)  BP(mean): --  RR: 18 (30 Jul 2018 05:32) (16 - 20)  SpO2: 98% (30 Jul 2018 05:32) (98% - 100%)  Daily Height in cm: 152.4 (30 Jul 2018 05:32)    Daily     GENERAL:  Appears stated age, well-groomed, well-nourished, no distress  HEENT:  NC/AT,  conjunctivae clear and pink, no thyromegaly, nodules, adenopathy, no JVD, sclera -anicteric  CHEST:  Full & symmetric excursion, no increased effort, breath sounds clear  HEART:  Regular rhythm, S1, S2, no murmur/rub/S3/S4, no abdominal bruit, no edema  ABDOMEN:  right flank pain with palpation, no rebound or guarding, obese belly  EXTEREMITIES:  no cyanosis,clubbing or edema  SKIN:  No rash/erythema/ecchymoses/petechiae/wounds/abscess/warm/dry  NEURO:  Alert, oriented, no asterixis, no tremor, no encephalopathy    LABS:                        10.5   5.4   )-----------( 76       ( 29 Jul 2018 00:04 )             32.9     07-29    138  |  108  |  21<H>  ----------------------------<  119<H>  4.5   |  25  |  0.85    Ca    7.9<L>      29 Jul 2018 00:04    TPro  8.4<H>  /  Alb  1.9<L>  /  TBili  2.1<H>  /  DBili  x   /  AST  61<H>  /  ALT  24  /  AlkPhos  215<H>  07-29    LIVER FUNCTIONS - ( 29 Jul 2018 00:04 )  Alb: 1.9 g/dL / Pro: 8.4 g/dL / ALK PHOS: 215 U/L / ALT: 24 U/L DA / AST: 61 U/L / GGT: x           PT/INR - ( 29 Jul 2018 00:04 )   PT: 18.8 sec;   INR: 1.71 ratio         PTT - ( 29 Jul 2018 00:04 )  PTT:34.9 sec        Imaging:

## 2018-07-30 NOTE — DISCHARGE NOTE ADULT - MEDICATION SUMMARY - MEDICATIONS TO STOP TAKING
I will STOP taking the medications listed below when I get home from the hospital:    linezolid 600 mg oral tablet  -- 1 tab(s) by mouth 2 times a day   -- Avoid chocolate, wine and cheese while taking this medication.  Finish all this medication unless otherwise directed by prescriber.  Obtain medical advice before taking any non-prescription drugs as some may affect the action of this medication.    cholecalciferol oral tablet  -- 1000 unit(s) by mouth once a day

## 2018-07-30 NOTE — DISCHARGE NOTE ADULT - CARE PLAN
Principal Discharge DX:	Acute cholecystitis  Goal:	improvement of pain  Assessment and plan of treatment:	you had an inflammation of your gallbladder. No blockages were seen on the MRCP done of your abdomen. You received antibiotics while in hospital and will need to take 2 antibiotics on discharge by mouth for one more day. Followup with surgeon outpatient.  Secondary Diagnosis:	Calculus of gallbladder with acute cholecystitis without obstruction  Goal:	prevent recurrence  Assessment and plan of treatment:	you need to followup with general surgery to see if you can have your gallbladder removed as an outpatient  Secondary Diagnosis:	Other cirrhosis of liver  Goal:	stable  Assessment and plan of treatment:	followup with your liver specialist at hepatology clinic. Call [phone] for appt. You will need a referral from your PCP.  Secondary Diagnosis:	Portal vein thrombosis  Goal:	resolution and followup with liver specialist  Assessment and plan of treatment:	we found a clot in one of the veins in your liver. The liver specialist suggested starting you on a blood thinner that you need to take for several months. You need to take this and followup with the liver specialist within 1 month.  Secondary Diagnosis:	Thrombocytopenia  Goal:	keep platelets stable  Assessment and plan of treatment:	your platelets that are responsible for clotting are chronically low in your body due to your liver cirrhosis. You are at risk for bleeding and bruising due to this but your platelet numbers have been stable.  Secondary Diagnosis:	SVT (supraventricular tachycardia)  Goal:	followup with cardiology  Assessment and plan of treatment:	you had an abnormal rhythm where your heart rate was fast and it was stopped with a medicine called adenosine. 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 Your heart rate has been stable and normal after this medication. You will need to continue taking the medication called metoprolol 25mg twice a day. Followup with cardiology/EP specialist  Secondary Diagnosis:	Hepatitis C virus infection without hepatic coma, unspecified chronicity  Goal:	followup with liver specialist  Assessment and plan of treatment:	Followup with your liver specialist if needs continued treatment Principal Discharge DX:	Acute cholecystitis  Goal:	improvement of pain  Assessment and plan of treatment:	you had an inflammation of your gallbladder. No blockages were seen on the MRCP done of your abdomen. You received antibiotics while in hospital and will need to take 2 antibiotics on discharge by mouth for one more day. Followup with surgeon outpatient.  Secondary Diagnosis:	Calculus of gallbladder with acute cholecystitis without obstruction  Goal:	prevent recurrence  Assessment and plan of treatment:	you need to followup with general surgery to see if you can have your gallbladder removed as an outpatient  Secondary Diagnosis:	Other cirrhosis of liver  Goal:	stable  Assessment and plan of treatment:	followup with your liver specialist at hepatology clinic. Call [phone] for appt. You will need a referral from your PCP.  Secondary Diagnosis:	Portal vein thrombosis  Goal:	resolution and followup with liver specialist  Assessment and plan of treatment:	we found a clot in one of the veins in your liver. The liver specialist suggested starting you on a blood thinner that you need to take for several months. You need to take this and followup with the liver specialist within 1 month.  Secondary Diagnosis:	Thrombocytopenia  Goal:	keep platelets stable  Assessment and plan of treatment:	your platelets that are responsible for clotting are chronically low in your body due to your liver cirrhosis. You are at risk for bleeding and bruising due to this but your platelet numbers have been stable.  Secondary Diagnosis:	SVT (supraventricular tachycardia)  Goal:	followup with cardiology  Assessment and plan of treatment:	you had an abnormal rhythm where your heart rate was fast and it was stopped with a medicine called adenosine. 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 Your heart rate has been stable and normal after this medication. You will need to continue taking the medication called metoprolol 25mg twice a day. Followup with cardiology/EP specialist  Secondary Diagnosis:	Hepatitis C virus infection without hepatic coma, unspecified chronicity  Goal:	follow up with liver specialist  Assessment and plan of treatment:	Followup with your liver specialist if needs continued treatment Principal Discharge DX:	Acute cholecystitis  Goal:	improvement of pain  Assessment and plan of treatment:	you had an inflammation of your gallbladder. No blockages were seen on the MRCP done of your abdomen. You received antibiotics while in hospital and will need to take 2 antibiotics on discharge by mouth for one more day. Followup with surgeon outpatient.  Secondary Diagnosis:	Calculus of gallbladder with acute cholecystitis without obstruction  Goal:	prevent recurrence  Assessment and plan of treatment:	you need to followup with general surgery to see if you can have your gallbladder removed as an outpatient  Secondary Diagnosis:	Other cirrhosis of liver  Goal:	stable  Assessment and plan of treatment:	followup with your liver specialist at hepatology clinic. Call [phone] for appt. You will need a referral from your PCP.  Secondary Diagnosis:	Portal vein thrombosis  Goal:	resolution and followup with liver specialist  Assessment and plan of treatment:	we found a clot in one of the veins in your liver. The liver specialist suggested starting you on a blood thinner that you need to take for several months. You need to take this and followup with the liver specialist within 1 month. You were started on coumadin in hospital and you need to get your INR checked on Mon 8/6  Secondary Diagnosis:	Thrombocytopenia  Goal:	keep platelets stable  Assessment and plan of treatment:	your platelets that are responsible for clotting are chronically low in your body due to your liver cirrhosis. You are at risk for bleeding and bruising due to this but your platelet numbers have been stable.  Secondary Diagnosis:	SVT (supraventricular tachycardia)  Goal:	followup with cardiology  Assessment and plan of treatment:	you had an abnormal rhythm where your heart rate was fast and it was stopped with a medicine called adenosine. 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 Your heart rate has been stable and normal after this medication. You will need to continue taking the medication called metoprolol 25mg twice a day. Followup with cardiology/EP specialist  Secondary Diagnosis:	Hepatitis C virus infection without hepatic coma, unspecified chronicity  Goal:	follow up with liver specialist  Assessment and plan of treatment:	Followup with your liver specialist if needs continued treatment

## 2018-07-30 NOTE — PROGRESS NOTE ADULT - PROBLEM SELECTOR PLAN 6
- Likely secondary to cholelithiasis/cirrhosis   - Trend liver enzymes, Tbili persistently elevated and in 2s since Feb 2018

## 2018-07-30 NOTE — PROGRESS NOTE ADULT - PROBLEM SELECTOR PLAN 5
- No evidence of bleeding, Elevated INR likely secondary to cirrhosis  - H/H stable, check iron panel, b12, folate

## 2018-07-30 NOTE — DISCHARGE NOTE ADULT - CARE PROVIDER_API CALL
Gosia Duarte), Internal Medicine  [address] [place] [address]  Phone: [phone]  Fax: [phone]    Cain Eng), Cardiac Electrophysiology; Cardiology; Internal Medicine  [address] [place] [address]  Phone: [phone]  Fax: [phone]    hepatology clinic,   Phone: [phone]  Fax: (   )    -    Angélica Carrington), DieticianNutrition; Surgery; Surgical Critical Care  [address] [facility]  [address], [place] [address]  Phone: [phone]  Fax: [phone] Gosia Duarte), Internal Medicine  [address] [place] [address]  Phone: [phone]  Fax: [phone]    Cain Eng), Cardiac Electrophysiology; Cardiology; Internal Medicine  [address] [place] [address]  Phone: [phone]  Fax: [phone]    hepatology clinic,   Phone: [phone]  Fax: (   )    -    Irvin Stevens), Gastroenterology; Internal Medicine  [address] [place] [address]  Phone: [phone]  Fax: [phone]

## 2018-07-30 NOTE — PROGRESS NOTE ADULT - PROBLEM SELECTOR PLAN 7
- Chronic. 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

## 2018-07-30 NOTE — CONSULT NOTE ADULT - SUBJECTIVE AND OBJECTIVE BOX
Patient seen and evaluated at bedside    Chief Complaint: Tachyarrhythmia    HPI:  63F with h/o HCV (RNA negative), liver cirrhosis who presents from OSH with acute pancreatitis, cholelithiasis with dilated CBD, transferred to [facility] for MRCP. This am pt was noted by rn with HR ~120, who notified PA. 12 lead EKG was obtained, showing regular narrow complex tachycardia to the 160s. Cardiology was consulted for SVT.     Pt seen and examined at bedside. Kazakh speaking pt, hx obtained via [employer] #[id number].  Pt felt palpitations and chest pain starting around 6am and lasting approx 2 hours. CP was left-sided anteriorly, non-radiating, dull moderate-to-severe. Accompanied by fatigue. Pt denies SOB, syncope. The palpitations and CP resolved spontaneously after ~2 hrs without intervention. Pt currently denies any cardiac symptoms such as CP, SOB, palpitations. Currently pt's main complaint is RUQ abdominal pain. Denies any cardiac history, has never been under the care of a cardiologist.     ED course:  - VS: /58, HR 77, RR 16, afebrile, SpO2 100% on RA  - Labs: CMP, CBC, Coags  - Given Zosyn, IVF (2018 12:22)      PMHx:   Edema of both legs  Cirrhosis  Bronchitis  Hepatitis C      PSHx:   S/P hysterectomy      Allergies:  No Known Allergies      Home Meds:  · 	rifAXIMin 550 mg oral tablet: 1 tab(s) orally 2 times a day  · 	lactulose 10 g/15 mL oral syrup: 30 milliliter(s) orally once a day (at bedtime)  · 	spironolactone 25 mg oral tablet: 2 tab(s) orally once a day  · 	Lasix 40 mg oral tablet: 1 tab(s) orally once a day  · 	vitamin A & D topical ointment: 1 application topically 3 times a day  · 	cholecalciferol oral tablet: 1000 unit(s) orally once a day  · 	linezolid 600 mg oral tablet: 1 tab(s) orally 2 times a day     Current Medications:   lactated ringers. 1000 milliLiter(s) IV Continuous <Continuous>  piperacillin/tazobactam IVPB. 3.375 Gram(s) IV Intermittent every 8 hours  rifaximin 550 milliGRAM(s) Oral two times a day      FAMILY HISTORY:  No pertinent family history in first degree relatives      Social History:  Smoking History: Denies  Alcohol Use: Denies  Drug Use: Denies    REVIEW OF SYSTEMS:  CONSTITUTIONAL: No weakness, fevers or chills  EYES/ENT: No visual changes;  No dysphagia  NECK: No pain or stiffness  RESPIRATORY: No cough, wheezing, hemoptysis; No shortness of breath  CARDIOVASCULAR: see HPI  GASTROINTESTINAL: Abdominal pain (RUQ)  BACK: No back pain  GENITOURINARY: No dysuria, frequency or hematuria  NEUROLOGICAL: No numbness or weakness  SKIN: No itching, burning, rashes, or lesions   All other review of systems is negative unless indicated above.    Physical Exam:  T(F): 98.8 (), Max: 98.8 ()  HR: 120 () (76 - 120)  BP: 122/74 () (112/45 - 128/76)  RR: 18 ()  SpO2: 98% ()  GENERAL: No acute distress, well-developed  HEAD:  Atraumatic, Normocephalic  ENT: EOMI, PERRLA, conjunctiva and sclera clear, Neck supple, No JVD, moist mucosa  CHEST/LUNG: Clear to auscultation bilaterally; No wheeze, equal breath sounds bilaterally   BACK: No spinal tenderness  HEART: Regular rate and rhythm; No murmurs, rubs, or gallops  ABDOMEN: Soft, Nontender, Nondistended; Bowel sounds present  EXTREMITIES:  No clubbing, cyanosis, or edema  PSYCH: Nl behavior, nl affect  NEUROLOGY: AAOx3, non-focal, cranial nerves intact  SKIN: Normal color, No rashes or lesions  LINES:    Cardiovascular Diagnostic Testing:    ECG: Personally reviewed: (8:45am)  regular, narrow QRS, no ST segment abnormalities    Labs: Personally reviewed                        9.9    3.27  )-----------( 61       ( 2018 05:55 )             30.4     -    134<L>  |  105  |  10  ----------------------------<  75  4.7   |  22  |  0.72    Ca    7.7<L>      2018 05:55  Phos  2.6       Mg     1.6     07-30    TPro  7.0  /  Alb  1.8<L>  /  TBili  2.7<H>  /  DBili  x   /  AST  34<H>  /  ALT  13  /  AlkPhos  105      PT/INR - ( 2018 05:55 )   PT: 19.9 SEC;   INR: 1.77          PTT - ( 2018 05:55 )  PTT:38.2 SEC    CARDIAC MARKERS ( 2018 00:04 )  x     / <0.015 ng/mL / x     / 103 U/L / x     / 1.8 ng/mL          Total Cholesterol: 69  LDL: 36  HDL: 32  T    Hemoglobin A1C, Whole Blood: 4.7 % ( @ 05:55) Patient seen and evaluated at bedside    Chief Complaint: Tachyarrhythmia    HPI:  63F with h/o HCV (RNA negative), liver cirrhosis who presents from OSH with acute pancreatitis, cholelithiasis with dilated CBD, transferred to [facility] for MRCP. This am pt was noted by rn with HR ~120, who notified PA. 12 lead EKG was obtained, showing regular narrow complex tachycardia to the 160s. Cardiology was consulted for SVT.     Pt seen and examined at bedside. Mongolian speaking pt, hx obtained via [email or web address] #[id number].  Pt felt palpitations and chest pain starting around 6am and lasting approx 2 hours. CP was left-sided anteriorly, non-radiating, dull moderate-to-severe. Accompanied by fatigue. Pt denies SOB, syncope. The palpitations and CP resolved spontaneously after ~2 hrs without intervention. Pt currently denies any cardiac symptoms such as CP, SOB, palpitations. Currently pt's main complaint is RUQ abdominal pain. Denies any cardiac history, has never been under the care of a cardiologist.     ED course:  - VS: /58, HR 77, RR 16, afebrile, SpO2 100% on RA  - Labs: CMP, CBC, Coags  - Given Zosyn, IVF (2018 12:22)      PMHx:   Edema of both legs  Cirrhosis  Bronchitis  Hepatitis C      PSHx:   S/P hysterectomy      Allergies:  No Known Allergies      Home Meds:  · 	rifAXIMin 550 mg oral tablet: 1 tab(s) orally 2 times a day  · 	lactulose 10 g/15 mL oral syrup: 30 milliliter(s) orally once a day (at bedtime)  · 	spironolactone 25 mg oral tablet: 2 tab(s) orally once a day  · 	Lasix 40 mg oral tablet: 1 tab(s) orally once a day  · 	vitamin A & D topical ointment: 1 application topically 3 times a day  · 	cholecalciferol oral tablet: 1000 unit(s) orally once a day  · 	linezolid 600 mg oral tablet: 1 tab(s) orally 2 times a day     Current Medications:   lactated ringers. 1000 milliLiter(s) IV Continuous <Continuous>  piperacillin/tazobactam IVPB. 3.375 Gram(s) IV Intermittent every 8 hours  rifaximin 550 milliGRAM(s) Oral two times a day      FAMILY HISTORY:  No pertinent family history in first degree relatives      Social History:  Smoking History: Denies  Alcohol Use: Denies  Drug Use: Denies    REVIEW OF SYSTEMS:  CONSTITUTIONAL: No weakness, fevers or chills  EYES/ENT: No visual changes;  No dysphagia  NECK: No pain or stiffness  RESPIRATORY: No cough, wheezing, hemoptysis; No shortness of breath  CARDIOVASCULAR: see HPI  GASTROINTESTINAL: Abdominal pain (RUQ)  BACK: No back pain  GENITOURINARY: No dysuria, frequency or hematuria  NEUROLOGICAL: No numbness or weakness  SKIN: No itching, burning, rashes, or lesions   All other review of systems is negative unless indicated above.    Physical Exam:  T(F): 98.8 (), Max: 98.8 ()  HR: 120 () (76 - 120)  BP: 122/74 () (112/45 - 128/76)  RR: 18 ()  SpO2: 98% ()  GENERAL: No acute distress, well-developed  HEAD:  Atraumatic, Normocephalic  ENT: EOMI, conjunctiva and sclera clear, Neck supple, No JVD, moist mucosa  CHEST/LUNG: Clear to auscultation bilaterally; No wheeze, equal breath sounds bilaterally   HEART: Regular rate and rhythm; No murmurs, rubs, or gallops  ABDOMEN: Soft, Nontender, Nondistended; Bowel sounds present  EXTREMITIES:  No clubbing, cyanosis. RLE slightly edematous vs left, non-pitting, with overlying skin induration. Negative Jace's   PSYCH: Nl behavior, nl affect  NEUROLOGY: AAOx3, non-focal, cranial nerves intact    Cardiovascular Diagnostic Testing:    ECG: Personally reviewed: (8:45am)  regular, narrow QRS, no ST segment abnormalities    Labs: Personally reviewed                        9.9    3.27  )-----------( 61       ( 2018 05:55 )             30.4     07-    134<L>  |  105  |  10  ----------------------------<  75  4.7   |  22  |  0.72    Ca    7.7<L>      2018 05:55  Phos  2.6       Mg     1.6         TPro  7.0  /  Alb  1.8<L>  /  TBili  2.7<H>  /  DBili  x   /  AST  34<H>  /  ALT  13  /  AlkPhos  105      PT/INR - ( 2018 05:55 )   PT: 19.9 SEC;   INR: 1.77          PTT - ( 2018 05:55 )  PTT:38.2 SEC    CARDIAC MARKERS ( 2018 00:04 )  x     / <0.015 ng/mL / x     / 103 U/L / x     / 1.8 ng/mL          Total Cholesterol: 69  LDL: 36  HDL: 32  T    Hemoglobin A1C, Whole Blood: 4.7 % ( @ 05:55)

## 2018-07-30 NOTE — DISCHARGE NOTE ADULT - PLAN OF CARE
you had an inflammation of your gallbladder. No blockages were seen on the MRCP done of your abdomen. You received antibiotics while in hospital and will need to take 2 antibiotics on discharge by mouth for one more day. Followup with surgeon outpatient. prevent recurrence you need to followup with general surgery to see if you can have your gallbladder removed as an outpatient stable followup with your liver specialist at hepatology clinic. Call [phone] for appt. You will need a referral from your PCP. resolution and followup with liver specialist we found a clot in one of the veins in your liver. The liver specialist suggested starting you on a blood thinner that you need to take for several months. You need to take this and followup with the liver specialist within 1 month. keep platelets stable your platelets that are responsible for clotting are chronically low in your body due to your liver cirrhosis. You are at risk for bleeding and bruising due to this but your platelet numbers have been stable. followup with cardiology you had an abnormal rhythm where your heart rate was fast and it was stopped with a medicine called adenosine. 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 Your heart rate has been stable and normal after this medication. You will need to continue taking the medication called metoprolol 25mg twice a day. Followup with cardiology/EP specialist followup with liver specialist Followup with your liver specialist if needs continued treatment improvement of pain follow up with liver specialist we found a clot in one of the veins in your liver. The liver specialist suggested starting you on a blood thinner that you need to take for several months. You need to take this and followup with the liver specialist within 1 month. You were started on coumadin in hospital and you need to get your INR checked on Mon 8/6

## 2018-07-30 NOTE — PROGRESS NOTE ADULT - ASSESSMENT
63F with h/o HCV (RNA negative with liver cirrhosis who presents with severe epigastric/RUQ abdominal pain which she says started about 1 year ago found to have acute pancreatitis.    1) RUQ pain  Patiet presenting with RUQ pain (acute on chronic) with lipase of >6000 with abdominal pain but without CT evidence of pancreatitis. CBD of 9mm but no evidence of stone. US showing acute cholecystitis that was not seen on CT. Patient with no WBC and fever.  DDx: Acute pancreatitis and/or choledocholithiasis vs. acute cholecystitis vs. acute cholangitis  2) Hep C cirrhosis  VL undetected in 2/2018. Now with cirrhosis  Ascites: none seen on imaging  Varices: Patient reports recent EGD which was normal but CT showing perisplenic varices and evidence of portal HTN  HCC: CT showing subcentimeter focus near the dome  MELD Na 16 (labs from yesterday, labs today still pending)  3) Diarrhea  Patient reporting 1 year of diarrhea (3x per day)  DDx: infectious etiology vs. malabsorption vs. IBS vs. IBD vs. celiacs disease    - please obtain MRCP to evaluate biliary tree  - please trend CBC, CMP, INR daily  - please obtain OSH EGD report  - please send stool studies GI PCR panel and Cdiff for diarrhea  - please check TTG IGA  - continue IV abx as per primary team

## 2018-07-30 NOTE — DISCHARGE NOTE ADULT - ADDITIONAL INSTRUCTIONS
PT/INR to be done on Monday 8/6/18. results to be sent to PMD for review and coumadin dosing  Follow up with your PMD within 1 week. Call for appointment  Take medications as prescribed  Follow up with GI Dr Stevens  within 1 week. Call for appointment  Follow up with EPS Dr Eng within 1 week. Call for appointment PT/INR to be done on Monday 8/6/18. results to be sent to PMD for review and coumadin dosing  Follow up with your PMD within 1 week. You may follow up in [facility]. Call for appointment - [phone] Appointment is scheduled for   Take medications as prescribed  Follow up with GI Dr Stevens  within 1 week. Call for appointment  Follow up with EPS Dr Egn within 1 week. Call for appointment PT/INR to be done on Monday 8/6/18. results to be sent to PMD for review and coumadin dosing  Follow up with your PMD within 1 week. You may follow up in [facility] clinic. Call for appointment - [phone] Appointment is scheduled for Monday 8/6/18   1:30 pm. Bring your ID, insurance card, and discharge papers with you. Ambulatory clinic -[facility] - is located on the 3rd floor of the oncology building  Take medications as prescribed  Follow up with GI Dr Stevens  within 1 week. Call for appointment  Follow up with EPS Dr Eng within 1 week. Call for appointment PT/INR to be done on 18. results to be sent to PMD for review and coumadin dosing  Follow up with your PMD within 1 week. You may follow up in [facility] clinic. Number is [phone] Appointment is scheduled for 18   1:30 pm. Bring your ID, insurance card, and discharge papers with you. Ambulatory clinic -[facility] - is located on the 3rd floor of the oncology [phone]- call for any Questions  Take medications as prescribed  Follow up with GI Dr Stevens  within 1 week. Call for appointment  Follow up with EPS Dr Eng within 1 week. Call for appointment

## 2018-07-30 NOTE — DISCHARGE NOTE ADULT - CARE PROVIDERS DIRECT ADDRESSES
,DirectAddress_Unknown,[email or web address] [email or web address],DirectAddress_Unknown,[email or web address] [email or web address] ,DirectAddress_Unknown,[email or web address] [email or web address].[employer].[facility],DirectAddress_Unknown,[email or web address] [email or web address]

## 2018-07-30 NOTE — DISCHARGE NOTE ADULT - PROVIDER TOKENS
TOKEN:'[id number]',TOKEN:'[id number]',FREE:[LAST:[hepatology clinic],PHONE:[phone]],FAX:[(   )    -]],TOKEN:'[id number]' TOKEN:'[id number]:'[id number]',FREE:[LAST:[hepatology clinic],PHONE:[phone]],FAX:[(   )    -]],TOKEN:'[id number]'

## 2018-07-30 NOTE — PROGRESS NOTE ADULT - PROBLEM SELECTOR PLAN 3
- Abdominal pain, elevated lipase to 6000s. No CT evidence of acute pancreatitis   - Likely gallstone pancreatitis given gallstones on imaging.   Continue IVF, pain control, f/u MRCP, surgery and GI recs appreciated  lipid panel unremarkable

## 2018-07-30 NOTE — PROGRESS NOTE ADULT - ASSESSMENT
63F with h/o HCV, liver cirrhosis who presents with severe epigastric/RUQ abdominal pain, associated with nausea. Transferred from OSH for MRCP/ERCP for choledocholithiasis. US/CT revealed distended GB with gallstones and dilated CBD to 9 mm with lipase 6000s but no CT evidence of pancreatitis. Course c/b SVT to 160s on 7/30

## 2018-07-30 NOTE — DISCHARGE NOTE ADULT - PATIENT PORTAL LINK FT
You can access the [facility] Patient Portal, offered by [facility], by registering with the following website: [email or web address]

## 2018-07-30 NOTE — PROGRESS NOTE ADULT - SUBJECTIVE AND OBJECTIVE BOX
Chief Complaint:  Patient is a 63y old  Female who presents with a chief complaint of ?Choledocholithiasis (30 Jul 2018 09:07)    Interval Events:   - Patient complained of palpitations. Found to have HRs in the 160s with spontaneous resolution per primary team. Patient transferred to telemetry unit for closer monitoring.  - Patient endorses continued intermittent episodes of abdominal pain, but endorse improvement    Allergies:  No Known Allergies    Hospital Medications:  lactated ringers. 1000 milliLiter(s) IV Continuous <Continuous>  piperacillin/tazobactam IVPB. 3.375 Gram(s) IV Intermittent every 8 hours  rifaximin 550 milliGRAM(s) Oral two times a day    PMHX/PSHX:  Edema of both legs  Cirrhosis  Bronchitis  Hepatitis C  S/P hysterectomy    Family history:  No pertinent family history in first degree relatives    ROS:     General:  No wt loss, fevers, chills, night sweats, fatigue,   Eyes:  Good vision, no reported pain  ENT:  No sore throat, pain, runny nose, dysphagia  CV:  No pain, +palpitations  Resp:  No dyspnea, cough  GI:  + pain, No nausea, No vomiting, No diarrhea, No constipation, No weight loss, No fever, No pruritis, No rectal bleeding, No tarry stools  :  No pain    PHYSICAL EXAM:   Vital Signs:  Vital Signs Last 24 Hrs  T(C): 37.1 (30 Jul 2018 05:32), Max: 37.1 (30 Jul 2018 05:32)  T(F): 98.8 (30 Jul 2018 05:32), Max: 98.8 (30 Jul 2018 05:32)  HR: 120 (30 Jul 2018 07:53) (76 - 120)  BP: 122/74 (30 Jul 2018 05:32) (112/45 - 128/76)  BP(mean): --  RR: 18 (30 Jul 2018 05:32) (16 - 20)  SpO2: 98% (30 Jul 2018 05:32) (98% - 100%)  Daily Height in cm: 152.4 (30 Jul 2018 05:32)    Daily     GENERAL: No acute distress, patient lying comfortably in bed  HEENT:  NC/AT, no scleral icterus  CHEST:  CTAB, no w/r/r  HEART:  Tachycardic, regular rhythm, no m/r/g  ABDOMEN:  Soft, non-tender, non-distended, normoactive bowel sounds, + hepatomegaly, no signs of chronic liver disease  EXTREMITIES:  No edema  SKIN:  No rash/erythema/ecchymoses/petechiae/wounds/abscess/warm/dry  NEURO:  Alert, oriented, no asterixis, no tremor, no encephalopathy    LABS:                        9.9    3.27  )-----------( 61       ( 30 Jul 2018 05:55 )             30.4     Mean Cell Volume: 96.8 fL (07-30-18 @ 05:55)    07-30    134<L>  |  105  |  10  ----------------------------<  75  4.7   |  22  |  0.72    Ca    7.7<L>      30 Jul 2018 05:55  Phos  2.6     07-30  Mg     1.6     07-30    TPro  7.0  /  Alb  1.8<L>  /  TBili  2.7<H>  /  DBili  x   /  AST  34<H>  /  ALT  13  /  AlkPhos  105  07-30    LIVER FUNCTIONS - ( 30 Jul 2018 05:55 )  Alb: 1.8 g/dL / Pro: 7.0 g/dL / ALK PHOS: 105 u/L / ALT: 13 u/L / AST: 34 u/L / GGT: x           PT/INR - ( 30 Jul 2018 05:55 )   PT: 19.9 SEC;   INR: 1.77          PTT - ( 30 Jul 2018 05:55 )  PTT:38.2 SEC                            9.9    3.27  )-----------( 61       ( 30 Jul 2018 05:55 )             30.4                         10.5   5.4   )-----------( 76       ( 29 Jul 2018 00:04 )             32.9     Imaging:     < from: CT Abdomen and Pelvis w/ Oral Cont and w/ IV Cont (07.29.18 @ 02:14) >  IMPRESSION:    No focal bowel wall thickening as clinically questioned.  Cirrhotic liver with an indeterminant subcentimeter focus near the dome   may be characterized by annual surveillance MRI.  Distended gallbladder containing gallstones. The CBD is dilated up to 9   mm. Recommend correlation with liver function tests. If clinically   indicated, further characterization with MRI/MRCP may be considered.  Perisplenic varices and splenomegaly indicative of portal hypertension.   Status post hysterectomy without small bowel obstruction.    < end of copied text >      MRCP pending

## 2018-07-30 NOTE — CONSULT NOTE ADULT - ASSESSMENT
63F PMHx HCV, cirrhosis presents from OSH with pancreatitis and ?choledocholithiasis pending MRCP. Cardiology consulted for symptomatic SVT lasting 2 hours this am. Now resolved spontaneously. Pt has no known cardiac hx 63F PMHx HCV, cirrhosis presents from OSH with pancreatitis and ?choledocholithiasis pending MRCP. Cardiology consulted for symptomatic SVT lasting 2 hours this am, spontaneously resolved. Neg cardiac enzymes. 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   -TTE   -Referred to EP for possible ablation  -General cardiology will continue to follow pending TTE

## 2018-07-30 NOTE — CONSULT NOTE ADULT - ASSESSMENT
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     Plan:   - will discuss with EP attending, role of ablation once pt's acute GI process is resolved.   - if SVT recurs would suggest breaking rhythm with adenosine and starting pt on beta blockers  - f/u TTE   - continue to monitor on tele.     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     Plan:   - will discuss with EP attending, role of ablation once pt's acute GI process is resolved.   - if SVT recurs would suggest breaking rhythm with adenosine and starting pt on beta blockers  - f/u TTE   - would obtain LE duplex to rule out DVT   - continue to monitor on tele.     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     Plan:   -role of ablation once pt's acute GI process is resolved   - if AVNRT recurs would suggest breaking rhythm with adenosine and starting pt on beta blockers  - f/u TTE   - continue to monitor on tele.     Wade Dumas MD  EP fellow   x [phone]     Addendum: called at 530 pm for repeat episode of SVT, appeared AVNRT at rate of 155 , pushed 6 mg of adenosine, broke into NSR 80s.

## 2018-07-30 NOTE — CONSULT NOTE ADULT - SUBJECTIVE AND OBJECTIVE BOX
Patient seen and evaluated at bedside    HPI:  62 yo F with h/o HCV (RNA negative), liver cirrhosis who presents with severe epigastric/RUQ abdominal pain. Symptom started yesterday 9/10 abdominal pain radiating to the back and associated with nausea and diarrhea. Pain was aching in quality. Pain worse with food and movement, better with rest. Patient initially presented to OSH yesterday when US/CT revealed distended GB with gallstones, distended gallbladder and dilated CBD to 9 mm, elevated liver enzymes and Lipase in the 6000s. She was given IVF and Zosyn then transferred here for possible ERCP.     Pt currently being treated for possible choledocholithiasis. This am pt was noted by rn with HR ~120, who notified PA. 12 lead EKG was obtained, showing regular narrow complex tachycardia to the 160s. EP was consulted for SVT concerning for AVNRT.   Pt felt palpitations and chest pain starting around 6am and lasting approx 2 hours. CP was left-sided anteriorly, non-radiating, dull moderate-to-severe. Accompanied by fatigue. Pt denies SOB, syncope. The palpitations and CP resolved spontaneously after ~2 hrs without intervention. Pt currently denies any cardiac symptoms such as CP, SOB, palpitations. Currently pt's main complaint is RUQ abdominal pain. Denies any cardiac history, has never been under the care of a cardiologist.       PMH:   Edema of both legs  Cirrhosis  Bronchitis  Hepatitis C      PSH:   S/P hysterectomy      Medications:   MEDICATIONS  (STANDING):  lactated ringers. 1000 milliLiter(s) (100 mL/Hr) IV Continuous <Continuous>  piperacillin/tazobactam IVPB. 3.375 Gram(s) IV Intermittent every 8 hours  rifaximin 550 milliGRAM(s) Oral two times a day      Allergies:  No Known Allergies      FAMILY HISTORY:  No pertinent family history in first degree relatives      Review of Systems:  REVIEW OF SYSTEMS:  CONSTITUTIONAL: No weakness, fevers or chills  EYES/ENT: No visual changes;  No dysphagia  NECK: No pain or stiffness  RESPIRATORY: No cough, wheezing, hemoptysis; No shortness of breath  CARDIOVASCULAR: No chest pain or palpitations; No lower extremity edema  GASTROINTESTINAL: see HPI   BACK: No back pain  GENITOURINARY: No dysuria, frequency or hematuria  NEUROLOGICAL: No numbness or weakness  SKIN: No itching, burning, rashes, or lesions   All other review of systems is negative unless indicated above.    Physical Exam:    T(F): 98.1 (-), Max: 98.8 ()  HR: 86 () (80 - 120)  BP: 111/49 (-) (111/49 - 128/76)  RR: 18 (-)  SpO2: 96% ()    GENERAL: No acute distress, well-developed  HEAD:  Atraumatic, Normocephalic  ENT: EOMI, PERRLA, conjunctiva and sclera clear, Neck supple, No JVD, moist mucosa  CHEST/LUNG: Clear to auscultation bilaterally; No wheeze, equal breath sounds bilaterally   BACK: No spinal tenderness  HEART: Regular rate and rhythm; No murmurs, rubs, or gallops  ABDOMEN: Soft, Nontender, Nondistended; Bowel sounds present  EXTREMITIES:  No clubbing, cyanosis, or edema  PSYCH: Nl behavior, nl affect  NEUROLOGY: AAOx3, non-focal, cranial nerves intact  SKIN: Normal color, No rashes or lesions  LINES:    Cardiovascular Diagnostic Testing:    ECG: Personally reviewed  HR of 167, narrow complex tachycardia with micro R waves after s- wave in lead V1-V2, suggestive of possible AVNRT.       Echo:      Stress Testing:      Cath:  none     Interpretation of Telemetry:    Imaging:    Labs: Personally reviewed                        10.5   3.97  )-----------( 60       ( 2018 10:35 )             32.9     -30    134<L>  |  105  |  10  ----------------------------<  75  4.7   |  22  |  0.72    Ca    7.7<L>      2018 05:55  Phos  2.6     -30  Mg     1.6     -30    TPro  7.0  /  Alb  1.8<L>  /  TBili  2.7<H>  /  DBili  x   /  AST  34<H>  /  ALT  13  /  AlkPhos  105  07-30    PT/INR - ( 2018 05:55 )   PT: 19.9 SEC;   INR: 1.77          PTT - ( 2018 05:55 )  PTT:38.2 SEC  CARDIAC MARKERS ( 2018 09:15 )  x     / x     / x     / 65 u/L / 1.76 ng/mL / x      CARDIAC MARKERS ( 2018 00:04 )  x     / <0.015 ng/mL / x     / 103 U/L / x     / 1.8 ng/mL        Total Cholesterol: 69  LDL: 36  HDL: 32  T    Hemoglobin A1C, Whole Blood: 4.7 % ( @ 05:55) Patient seen and evaluated at bedside    HPI:  64 yo F with h/o HCV (RNA negative), liver cirrhosis who presents with severe epigastric/RUQ abdominal pain. Symptom started yesterday 9/10 abdominal pain radiating to the back and associated with nausea and diarrhea. Pain was aching in quality. Pain worse with food and movement, better with rest. Patient initially presented to OSH yesterday when US/CT revealed distended GB with gallstones, distended gallbladder and dilated CBD to 9 mm, elevated liver enzymes and Lipase in the 6000s. She was given IVF and Zosyn then transferred here for possible ERCP.     Pt currently being treated for possible choledocholithiasis. This am pt was noted by rn with HR ~120, who notified PA. 12 lead EKG was obtained, showing regular narrow complex tachycardia to the 160s. EP was consulted for SVT concerning for AVNRT.   Pt felt palpitations and chest pain starting around 6am and lasting approx 2 hours. CP was left-sided anteriorly, non-radiating, dull moderate-to-severe. Accompanied by fatigue. Pt denies SOB, syncope. The palpitations and CP resolved spontaneously after ~2 hrs without intervention. Pt currently denies any cardiac symptoms such as CP, SOB, palpitations. Currently pt's main complaint is RUQ abdominal pain. Denies any cardiac history, has never been under the care of a cardiologist.       PMH:   Edema of both legs  Cirrhosis  Bronchitis  Hepatitis C      PSH:   S/P hysterectomy      Medications:   MEDICATIONS  (STANDING):  lactated ringers. 1000 milliLiter(s) (100 mL/Hr) IV Continuous <Continuous>  piperacillin/tazobactam IVPB. 3.375 Gram(s) IV Intermittent every 8 hours  rifaximin 550 milliGRAM(s) Oral two times a day      Allergies:  No Known Allergies      FAMILY HISTORY:  No pertinent family history in first degree relatives      Review of Systems:  REVIEW OF SYSTEMS:  CONSTITUTIONAL: No weakness, fevers or chills  EYES/ENT: No visual changes;  No dysphagia  NECK: No pain or stiffness  RESPIRATORY: No cough, wheezing, hemoptysis; No shortness of breath  CARDIOVASCULAR: No chest pain or palpitations; No lower extremity edema  GASTROINTESTINAL: see HPI   BACK: No back pain  GENITOURINARY: No dysuria, frequency or hematuria  NEUROLOGICAL: No numbness or weakness  SKIN: No itching, burning, rashes, or lesions   All other review of systems is negative unless indicated above.    Physical Exam:    T(F): 98.1 (-), Max: 98.8 ()  HR: 86 () (80 - 120)  BP: 111/49 (-) (111/49 - 128/76)  RR: 18 (-)  SpO2: 96% ()    GENERAL: No acute distress, well-developed  HEAD:  Atraumatic, Normocephalic  ENT: EOMI, PERRLA, conjunctiva and sclera clear, Neck supple, No JVD, moist mucosa  CHEST/LUNG: Clear to auscultation bilaterally; No wheeze, equal breath sounds bilaterally   BACK: No spinal tenderness  HEART: Regular rate and rhythm; No murmurs, rubs, or gallops  ABDOMEN: Soft, Nontender, Nondistended; Bowel sounds present  EXTREMITIES:  RLE edema > LLE edema   PSYCH: Nl behavior, nl affect  NEUROLOGY: AAOx3, non-focal, cranial nerves intact  SKIN: Normal color, No rashes or lesions  LINES:    Cardiovascular Diagnostic Testing:    ECG: Personally reviewed  HR of 167, narrow complex tachycardia with micro R waves after s- wave in lead V1-V2, suggestive of possible AVNRT.       Echo:      Stress Testing:      Cath:  none     Interpretation of Telemetry:    Imaging:    Labs: Personally reviewed                        10.5   3.97  )-----------( 60       ( 2018 10:35 )             32.9     -30    134<L>  |  105  |  10  ----------------------------<  75  4.7   |  22  |  0.72    Ca    7.7<L>      2018 05:55  Phos  2.6     07-30  Mg     1.6     -30    TPro  7.0  /  Alb  1.8<L>  /  TBili  2.7<H>  /  DBili  x   /  AST  34<H>  /  ALT  13  /  AlkPhos  105  07-30    PT/INR - ( 2018 05:55 )   PT: 19.9 SEC;   INR: 1.77          PTT - ( 2018 05:55 )  PTT:38.2 SEC  CARDIAC MARKERS ( 2018 09:15 )  x     / x     / x     / 65 u/L / 1.76 ng/mL / x      CARDIAC MARKERS ( 2018 00:04 )  x     / <0.015 ng/mL / x     / 103 U/L / x     / 1.8 ng/mL        Total Cholesterol: 69  LDL: 36  HDL: 32  T    Hemoglobin A1C, Whole Blood: 4.7 % ( @ 05:55)

## 2018-07-30 NOTE — DISCHARGE NOTE ADULT - MEDICATION SUMMARY - MEDICATIONS TO TAKE
I will START or STAY ON the medications listed below when I get home from the hospital:    spironolactone 25 mg oral tablet  -- 2 tab(s) by mouth once a day  -- Indication: For Cardiac     Coumadin 2 mg oral tablet  -- 1 tab(s) by mouth once a day (at bedtime)  -- Indication: For SVT (supraventricular tachycardia)    metoprolol tartrate 25 mg oral tablet  -- 1 tab(s) by mouth 2 times a day  -- Indication: For SVT (supraventricular tachycardia)    vitamin A & D topical ointment  -- 1 application on skin 3 times a day  -- Indication: For Skin    furosemide 40 mg oral tablet  -- 1 tab(s) by mouth once a day  -- Indication: For fluid excess    lactulose 10 g/15 mL oral syrup  -- 30 milliliter(s) by mouth once a day (at bedtime)  -- Indication: For Need for prophylactic measure    rifAXIMin 550 mg oral tablet  -- 1 tab(s) by mouth 2 times a day  -- Indication: For Hepatitis C virus infection without hepatic coma, unspecified chronicity

## 2018-07-30 NOTE — CONSULT NOTE ADULT - ATTENDING COMMENTS
I have reviewed the history, pertinent labs and imaging, and discussed the care with the consult resident.  Agree with her assessment and recommendation.    The Acute Care Surgery (B Team) Attending Group Practice:  Dr. Delfino Baker, Dr. Angélica Carrington, Dr. Byron Thompson, Dr. Chana Davis, Dr. Dick Bobo    urgent issues - spectra [id number] or [id number]  nonurgent issues - [phone]  patient appointments or afterhours - [phone]
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  Does have slightly dilated bile duct and elevated T bili (may be at baseline given ESLD).  Inflammation at gallbladder - unclear if reactive to inflammation in area or primary causea as well.  Intermediate risk for choledocolithiasis.  Next step is MRCP, no role for empiric ERCP at this time.  Benign exam    Impression:  1) Acute pancreatitis with gallbladder thickening (? 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

## 2018-07-30 NOTE — CHART NOTE - NSCHARTNOTEFT_GEN_A_CORE
Called to patients bedside for SVT, and pt. co abd RUQ pain and chest tightness with the SVT.  VSS.  Pt. complaining NPO all day and hungry.  EP called.  Adenosine given and SVT broke.  Attending called.  NPO order changed to clears.  MRI called-they may not be able to do pt. tonight.  Pt. now stable in NSR on IVF and clears.  No complaints of pain except some intermittent RUQ discomfort.  Continue to monitor.  If another episode, call cardiology/EP and pt. may require adenosine and BB.

## 2018-07-30 NOTE — DISCHARGE NOTE ADULT - SECONDARY DIAGNOSIS.
Calculus of gallbladder with acute cholecystitis without obstruction Other cirrhosis of liver Portal vein thrombosis Thrombocytopenia SVT (supraventricular tachycardia) Hepatitis C virus infection without hepatic coma, unspecified chronicity

## 2018-07-30 NOTE — DISCHARGE NOTE ADULT - HOSPITAL COURSE
63F with h/o HCV, liver cirrhosis who presents with severe epigastric/RUQ abdominal pain, associated with nausea. Transferred from OSH for possible ERCP for choledocholithiasis US/CT revealed distended GB with gallstones and dilated CBD to 9 mm. Lipase 6000s.   -Surgery and GI consulted: recommended MRCP---   -Calf tenderness: LE doppler ordered   -Tachycardia: EKG revealed HR 160s, complaining of palpitations and chest pain- trop/cpk ordered, drop in hemoglobin-- cbc/blood occult, cardiology consulted  -HR now in 90s on monitor-- transfer to tele 63F with h/o HCV, liver cirrhosis who presents with severe epigastric/RUQ abdominal pain, associated with nausea. Transferred from OSH for possible ERCP for choledocholithiasis US/CT revealed distended GB with gallstones and dilated CBD to 9 mm. Lipase 6000s.   -Surgery and GI consulted: recommended MRCP which showed acute cholecystitis without choledocholithiasis as well as intrahepatic portal vein thrombosis for which hepatology recommended anticoagulation. No plans for surgery inpatient. 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 TTE was WNL. Calf tenderness: LE US negative 63F with h/o HCV, liver cirrhosis who presents with severe epigastric/RUQ abdominal pain, associated with nausea. Transferred from OSH for possible ERCP for choledocholithiasis US/CT revealed distended GB with gallstones and dilated CBD to 9 mm. Lipase 6000s.   -Surgery and GI consulted: recommended MRCP which showed acute cholecystitis without choledocholithiasis as well as intrahepatic portal vein thrombosis for which hepatology recommended anticoagulation. No plans for surgery inpatient. 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 TTE was WNL. Calf tenderness: LE US negative  R/O Choledocholithiasis  - US abdomen and CT abdomen/pelvis with gallstones, distended gallbladder and ?dilated CBD to 0.9mm  - Trend WBC, liver enzymes, bilirubin, coags   - GI consulted via email for possible ERCP to confirm the diagnosis and provide biliary drainage  - NPO for now. Follow up GI recommendations  - Surgery consulted for possible cholecystectomy     Cholelithiasis  - NPO for now. Follow up GI recommendations  - Continue Zosyn and IVF.     Acute pancreatitis  - Abdominal pain, elevated lipase to 6000s. No CT evidence of acute pancreatitis   - Likely gallstone pancreatitis given gallstones on imaging.  alcohol use, no new medications, no hypercalcemia    Cirrhosis  - Likely secondary to chronic hepatitis  - Complicated by coagulopathy, hypoalbuminemia, thrombocytopenia  - Coagulopathy: elevated INR, low platelet  - Continue rifaximin, hold diuretics. Hold lactulose for now in the setting of loose stool.    Coagulopathy  - Elevated INR likely secondary to cirrhosis  - Monitor coags and signs of bleeding.     Elevated liver enzymes  - Likely secondary to cholelithiasis/cirrhosis   - Trend liver enzymes.    Thrombocytopenia  - Chronic. Likely complication of cirrhosis due to splenic sequestration  - Monitor platelet and signs of bleeding.     Venous stasis  - LE dopplers NEG for DVT    7/31 Echo: CONCLUSIONS: Mitral annular calcification, otherwise normal mitral  valve. Mild-moderate mitral regurgitation. Normal left ventricular internal dimensions and wall thicknesses.. Endocardium not well visualized; grossly normal left ventricular systolic function. The right ventricle is not well visualized; grossly normal right ventricular systolic function. Estimated right ventricular systolic pressure equals 42 mm Hg, assuming right atrial pressure equals 10 mm Hg,consistent with mild pulmonary hypertension.  7/31 Amylase/lipase levels are normal today  7/31-: MR MRCP No Cont -Liver cirrhosis with portal hypertension. 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 Patent main portal and hepatic veins. No HCC. Cholelithiasis with wall thickening and pericholecystic fluid, concerning for acute cholecystitis. No biliary distention. No choledocholithiasis. 63F with h/o HCV, liver cirrhosis who presents with severe epigastric/RUQ abdominal pain, associated with nausea. Transferred from OSH for possible ERCP for choledocholithiasis US/CT revealed distended GB with gallstones and dilated CBD to 9 mm. Lipase 6000s.     MRCP  showed acute cholecystitis without choledocholithiasis as well as intrahepatic portal vein thrombosis for which hepatology recommended anticoagulation therefore transitioned from lovenox to coumadin, INR therapeutic on day of DC. INR check for Mon 8/6/18 scheduled. Completed 5 days of Zosyn inpatient. No plans for surgery inpatient.     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 TTE was WNL. Calf tenderness: LE US negative

## 2018-07-30 NOTE — PROGRESS NOTE ADULT - PROBLEM SELECTOR PLAN 1
self resolved, was seen in NSR 80s on Zoll monitor, ? related to pain vs cholecystitis vs choledocholithiasis  EKG personally reviewed with SVT to 167, no ST-T changes, TTE pending  Transfer to [facility], HST negative, cardio consulted, not on beta blocker at home

## 2018-07-30 NOTE — PROGRESS NOTE ADULT - ASSESSMENT
Impression:  1) RUQ pain: Concern for acute pancreatitis versus biliary pathology - general GI service following  2) Cirrhosis: Positive Hep C Ab, negative HCV RNA in 2/2018.   Ascites: No ascites on CT A/P on 7/29  Varices: EGD normal per patient, performed at OSH  HCC: CT A/P on 7/29 showing subcentimeter focus near the dome; Abd U/S on 7/28 with no lesions  MELD-Na (7/30/18): 19     - Pending MCRP   - Obtain OSH EGD   - Patient will require surveillance imaging with RUQ every 6 months   - Hepatology signing off; please re-consult if there are any additional questions or concerns    Shad Vanegas MD  Gastroenterology Fellow  Pager number: [phone] / [phone]

## 2018-07-30 NOTE — CHART NOTE - NSCHARTNOTEFT_GEN_A_CORE
Notified by rn that pt's HR elevated in 120s. EKG ordered- showed SVT in 160s. Complaining of palpitations and chest pain- denies any difficulty breathing. Seen and evaluated pt at bedside- in no acute distress, lungs sounds CTA, +tachy. LE doppler ordered overnight for calf tenderness, r/o DVT. HR came down to 90s on its own w/o medical intervention ; /69, O2 sat 99% on RA. Cardiology consult called. Trop/CPK, cbc and blood occult ordered. Dr. Shepherd notified. Recommended transfer to tele-- bed board called and updated d/c summary. Will continue to monitor.

## 2018-07-30 NOTE — PROGRESS NOTE ADULT - SUBJECTIVE AND OBJECTIVE BOX
Patient is a 63y old  Female who presents with a chief complaint of ?Choledocholithiasis (30 Jul 2018 09:07)      SUBJECTIVE / OVERNIGHT EVENTS: Had SVT up to 167 this morning with complaint of chest pain and palpitations that self resolved. At time of exam patient reports having RUQ pain without N/V/D. Chest pain and palpitations now resolved.     MEDICATIONS  (STANDING):  lactated ringers. 1000 milliLiter(s) (100 mL/Hr) IV Continuous <Continuous>  piperacillin/tazobactam IVPB. 3.375 Gram(s) IV Intermittent every 8 hours  rifaximin 550 milliGRAM(s) Oral two times a day    MEDICATIONS  (PRN):      T(C): 37.1 (07-30-18 @ 05:32), Max: 37.1 (07-30-18 @ 05:32)  HR: 120 (07-30-18 @ 07:53) (76 - 120)  BP: 122/74 (07-30-18 @ 05:32) (112/45 - 128/76)  RR: 18 (07-30-18 @ 05:32) (16 - 20)  SpO2: 98% (07-30-18 @ 05:32) (98% - 100%)  CAPILLARY BLOOD GLUCOSE        I&O's Summary      PHYSICAL EXAM:  GENERAL: no apparent distress, on room air with Zoll pads on  EYES: sclera clear b/l  CHEST/LUNG: Clear to auscultation bilaterally; No wheezing or crackles  HEART: s1/s2, Regular rate and rhythm; No murmurs appreciated, (NSR in 80s on Zoll monitor)  ABDOMEN: Soft, RUQ and epigastric TTP, + bowel sounds  EXTREMITIES: No clubbing, cyanosis, dark hyperpigmentation of b/l lower extremities nontender to palpation with some swelling  NEUROLOGY: awake, alert, responds to Qs appropriately, no gross deficits  SKIN: hyperpigmentation of b/l lower legs    LABS:                        10.5   3.97  )-----------( 60       ( 30 Jul 2018 10:35 )             32.9     07-30    134<L>  |  105  |  10  ----------------------------<  75  4.7   |  22  |  0.72    Ca    7.7<L>      30 Jul 2018 05:55  Phos  2.6     07-30  Mg     1.6     07-30    TPro  7.0  /  Alb  1.8<L>  /  TBili  2.7<H>  /  DBili  x   /  AST  34<H>  /  ALT  13  /  AlkPhos  105  07-30    PT/INR - ( 30 Jul 2018 05:55 )   PT: 19.9 SEC;   INR: 1.77          PTT - ( 30 Jul 2018 05:55 )  PTT:38.2 SEC  CARDIAC MARKERS ( 30 Jul 2018 09:15 )  x     / x     / 65 u/L / 1.76 ng/mL / x      CARDIAC MARKERS ( 29 Jul 2018 00:04 )  <0.015 ng/mL / x     / 103 U/L / x     / 1.8 ng/mL          RADIOLOGY & ADDITIONAL TESTS:

## 2018-07-31 LAB
ALBUMIN SERPL ELPH-MCNC: 2 G/DL — LOW (ref 3.3–5)
ALP SERPL-CCNC: 95 U/L — SIGNIFICANT CHANGE UP (ref 40–120)
ALT FLD-CCNC: 15 U/L — SIGNIFICANT CHANGE UP (ref 4–33)
AMYLASE P1 CFR SERPL: 77 U/L — SIGNIFICANT CHANGE UP (ref 25–125)
APTT BLD: 37 SEC — SIGNIFICANT CHANGE UP (ref 27.5–37.4)
AST SERPL-CCNC: 34 U/L — HIGH (ref 4–32)
BASOPHILS # BLD AUTO: 0.01 K/UL — SIGNIFICANT CHANGE UP (ref 0–0.2)
BASOPHILS NFR BLD AUTO: 0.4 % — SIGNIFICANT CHANGE UP (ref 0–2)
BILIRUB SERPL-MCNC: 2.4 MG/DL — HIGH (ref 0.2–1.2)
BUN SERPL-MCNC: 10 MG/DL — SIGNIFICANT CHANGE UP (ref 7–23)
CALCIUM SERPL-MCNC: 7.9 MG/DL — LOW (ref 8.4–10.5)
CHLORIDE SERPL-SCNC: 106 MMOL/L — SIGNIFICANT CHANGE UP (ref 98–107)
CO2 SERPL-SCNC: 22 MMOL/L — SIGNIFICANT CHANGE UP (ref 22–31)
CREAT SERPL-MCNC: 0.66 MG/DL — SIGNIFICANT CHANGE UP (ref 0.5–1.3)
EOSINOPHIL # BLD AUTO: 0.11 K/UL — SIGNIFICANT CHANGE UP (ref 0–0.5)
EOSINOPHIL NFR BLD AUTO: 4 % — SIGNIFICANT CHANGE UP (ref 0–6)
FERRITIN SERPL-MCNC: 246.5 NG/ML — HIGH (ref 15–150)
FOLATE SERPL-MCNC: 12.6 NG/ML — SIGNIFICANT CHANGE UP (ref 4.7–20)
GLUCOSE SERPL-MCNC: 77 MG/DL — SIGNIFICANT CHANGE UP (ref 70–99)
HCT VFR BLD CALC: 30.9 % — LOW (ref 34.5–45)
HGB BLD-MCNC: 9.9 G/DL — LOW (ref 11.5–15.5)
IMM GRANULOCYTES # BLD AUTO: 0.03 # — SIGNIFICANT CHANGE UP
IMM GRANULOCYTES NFR BLD AUTO: 1.1 % — SIGNIFICANT CHANGE UP (ref 0–1.5)
INR BLD: 1.64 — HIGH (ref 0.88–1.17)
IRON SATN MFR SERPL: 165 UG/DL — SIGNIFICANT CHANGE UP (ref 140–530)
IRON SATN MFR SERPL: 54 UG/DL — SIGNIFICANT CHANGE UP (ref 30–160)
LIDOCAIN IGE QN: 38 U/L — SIGNIFICANT CHANGE UP (ref 7–60)
LYMPHOCYTES # BLD AUTO: 0.52 K/UL — LOW (ref 1–3.3)
LYMPHOCYTES # BLD AUTO: 18.9 % — SIGNIFICANT CHANGE UP (ref 13–44)
MAGNESIUM SERPL-MCNC: 1.7 MG/DL — SIGNIFICANT CHANGE UP (ref 1.6–2.6)
MCHC RBC-ENTMCNC: 30.8 PG — SIGNIFICANT CHANGE UP (ref 27–34)
MCHC RBC-ENTMCNC: 32 % — SIGNIFICANT CHANGE UP (ref 32–36)
MCV RBC AUTO: 96.3 FL — SIGNIFICANT CHANGE UP (ref 80–100)
MONOCYTES # BLD AUTO: 0.33 K/UL — SIGNIFICANT CHANGE UP (ref 0–0.9)
MONOCYTES NFR BLD AUTO: 12 % — SIGNIFICANT CHANGE UP (ref 2–14)
NEUTROPHILS # BLD AUTO: 1.75 K/UL — LOW (ref 1.8–7.4)
NEUTROPHILS NFR BLD AUTO: 63.6 % — SIGNIFICANT CHANGE UP (ref 43–77)
NRBC # FLD: 0 — SIGNIFICANT CHANGE UP
PHOSPHATE SERPL-MCNC: 3.1 MG/DL — SIGNIFICANT CHANGE UP (ref 2.5–4.5)
PLATELET # BLD AUTO: 49 K/UL — LOW (ref 150–400)
PMV BLD: 10.1 FL — SIGNIFICANT CHANGE UP (ref 7–13)
POTASSIUM SERPL-MCNC: 4.2 MMOL/L — SIGNIFICANT CHANGE UP (ref 3.5–5.3)
POTASSIUM SERPL-SCNC: 4.2 MMOL/L — SIGNIFICANT CHANGE UP (ref 3.5–5.3)
PROT SERPL-MCNC: 7 G/DL — SIGNIFICANT CHANGE UP (ref 6–8.3)
PROTHROM AB SERPL-ACNC: 19 SEC — HIGH (ref 9.8–13.1)
RBC # BLD: 3.21 M/UL — LOW (ref 3.8–5.2)
RBC # FLD: 18.3 % — HIGH (ref 10.3–14.5)
SODIUM SERPL-SCNC: 136 MMOL/L — SIGNIFICANT CHANGE UP (ref 135–145)
UIBC SERPL-MCNC: 111 UG/DL — SIGNIFICANT CHANGE UP (ref 110–370)
VIT B12 SERPL-MCNC: 1224 PG/ML — HIGH (ref 200–900)
WBC # BLD: 2.75 K/UL — LOW (ref 3.8–10.5)
WBC # FLD AUTO: 2.75 K/UL — LOW (ref 3.8–10.5)

## 2018-07-31 PROCEDURE — 99233 SBSQ HOSP IP/OBS HIGH 50: CPT

## 2018-07-31 PROCEDURE — 93306 TTE W/DOPPLER COMPLETE: CPT | Mod: 26

## 2018-07-31 PROCEDURE — 99232 SBSQ HOSP IP/OBS MODERATE 35: CPT | Mod: GC

## 2018-07-31 PROCEDURE — 74181 MRI ABDOMEN W/O CONTRAST: CPT | Mod: 26

## 2018-07-31 RX ORDER — SPIRONOLACTONE 25 MG/1
50 TABLET, FILM COATED ORAL DAILY
Qty: 0 | Refills: 0 | Status: DISCONTINUED | OUTPATIENT
Start: 2018-07-31 | End: 2018-08-03

## 2018-07-31 RX ORDER — LACTULOSE 10 G/15ML
20 SOLUTION ORAL AT BEDTIME
Qty: 0 | Refills: 0 | Status: DISCONTINUED | OUTPATIENT
Start: 2018-07-31 | End: 2018-08-03

## 2018-07-31 RX ORDER — FUROSEMIDE 40 MG
40 TABLET ORAL DAILY
Qty: 0 | Refills: 0 | Status: DISCONTINUED | OUTPATIENT
Start: 2018-07-31 | End: 2018-08-03

## 2018-07-31 RX ADMIN — Medication 25 MILLIGRAM(S): at 06:06

## 2018-07-31 RX ADMIN — PIPERACILLIN AND TAZOBACTAM 25 GRAM(S): 4; .5 INJECTION, POWDER, LYOPHILIZED, FOR SOLUTION INTRAVENOUS at 14:30

## 2018-07-31 RX ADMIN — PIPERACILLIN AND TAZOBACTAM 25 GRAM(S): 4; .5 INJECTION, POWDER, LYOPHILIZED, FOR SOLUTION INTRAVENOUS at 06:06

## 2018-07-31 RX ADMIN — PIPERACILLIN AND TAZOBACTAM 25 GRAM(S): 4; .5 INJECTION, POWDER, LYOPHILIZED, FOR SOLUTION INTRAVENOUS at 22:39

## 2018-07-31 RX ADMIN — Medication 40 MILLIGRAM(S): at 18:14

## 2018-07-31 RX ADMIN — LACTULOSE 20 GRAM(S): 10 SOLUTION ORAL at 22:39

## 2018-07-31 NOTE — PROGRESS NOTE ADULT - SUBJECTIVE AND OBJECTIVE BOX
Patient is a 63y old  Female who presents with a chief complaint of ?Choledocholithiasis (30 Jul 2018 09:07)      SUBJECTIVE / OVERNIGHT EVENTS: Had recurrent SVT with rate to 150s at 5pm on 7/30 s/p 6 mg adenosine which broke rhythm and patient has been in NSR since. Denies chest pain, palpitations, SOB, N/V/D. Was able to tolerate CLQ diet. States the food did not make the RUQ pain worse. Continues to have RUQ pain radiating to right mid back. Tele with no further events of SVT after receiving adenosine on 7/30, currently in NSR.    MEDICATIONS  (STANDING):  lactated ringers. 1000 milliLiter(s) (100 mL/Hr) IV Continuous <Continuous>  piperacillin/tazobactam IVPB. 3.375 Gram(s) IV Intermittent every 8 hours  rifaximin 550 milliGRAM(s) Oral two times a day    MEDICATIONS  (PRN):      T(C): 36.8 (07-31-18 @ 06:05), Max: 36.9 (07-30-18 @ 17:20)  HR: 75 (07-31-18 @ 06:05) (69 - 154)  BP: 109/64 (07-31-18 @ 06:05) (109/64 - 133/65)  RR: 18 (07-31-18 @ 06:05) (16 - 18)  SpO2: 100% (07-31-18 @ 06:05) (96% - 100%)  CAPILLARY BLOOD GLUCOSE        I&O's Summary    PHYSICAL EXAM:  GENERAL: no apparent distress, on room air, found resting comfortably  EYES: sclera clear b/l  CHEST/LUNG: Clear to auscultation bilaterally; No wheezing or crackles  HEART: s1/s2, regular rate and rhythm, no murmurs aprpeciated  ABDOMEN: Soft, RUQ and right paraspinal TTP, + bowel sounds  EXTREMITIES: No clubbing, cyanosis, dark hyperpigmentation of b/l lower extremities nontender to palpation with some swelling  NEUROLOGY: awake, alert, responds to Qs appropriately, no gross deficits  SKIN: hyperpigmentation of b/l lower legs    LABS:                        9.9    2.75  )-----------( 49       ( 31 Jul 2018 05:35 )             30.9     07-31    136  |  106  |  10  ----------------------------<  77  4.2   |  22  |  0.66    Ca    7.9<L>      31 Jul 2018 05:35  Phos  3.1     07-31  Mg     1.7     07-31    TPro  7.0  /  Alb  2.0<L>  /  TBili  2.4<H>  /  DBili  x   /  AST  34<H>  /  ALT  15  /  AlkPhos  95  07-31    PT/INR - ( 31 Jul 2018 05:35 )   PT: 19.0 SEC;   INR: 1.64          PTT - ( 31 Jul 2018 05:35 )  PTT:37.0 SEC  CARDIAC MARKERS ( 30 Jul 2018 09:15 )  x     / x     / 65 u/L / 1.76 ng/mL / x              RADIOLOGY & ADDITIONAL TESTS:

## 2018-07-31 NOTE — PROGRESS NOTE ADULT - SUBJECTIVE AND OBJECTIVE BOX
Chief Complaint:  Patient is a 63y old  Female who presents with a chief complaint of ?Choledocholithiasis (30 Jul 2018 09:07)      Interval Events:   pt still c/o mild intermittent RUQ pain, no fevers no chills  tolerating clear liquid diet  no nausea or vomiting   MRCP is pending     Hospital Medications:  lactated ringers. 1000 milliLiter(s) IV Continuous <Continuous>  piperacillin/tazobactam IVPB. 3.375 Gram(s) IV Intermittent every 8 hours  rifaximin 550 milliGRAM(s) Oral two times a day        PHYSICAL EXAM:   Vital Signs:  Vital Signs Last 24 Hrs  T(C): 36.8 (31 Jul 2018 06:05), Max: 36.9 (30 Jul 2018 17:20)  T(F): 98.2 (31 Jul 2018 06:05), Max: 98.5 (30 Jul 2018 17:20)  HR: 75 (31 Jul 2018 06:05) (69 - 154)  BP: 109/64 (31 Jul 2018 06:05) (109/64 - 133/65)  BP(mean): --  RR: 18 (31 Jul 2018 06:05) (16 - 18)  SpO2: 100% (31 Jul 2018 06:05) (96% - 100%)  Daily     Daily     GENERAL:  Appears stated age,  no distress  HEENT:   sclera -anicteric  CHEST:   no increased effort, breath sounds clear  HEART:  Regular rhythm, S1, S2,   ABDOMEN:  Soft, non-tender, non-distended, normoactive bowel sounds,    EXTEREMITIES:  no cyanosis,clubbing or edema  SKIN:  No rash/no jaundice   NEURO:  Alert, oriented    LABS:                        9.9    2.75  )-----------( 49       ( 31 Jul 2018 05:35 )             30.9     Mean Cell Volume: 96.3 fL (07-31-18 @ 05:35)    07-31    136  |  106  |  10  ----------------------------<  77  4.2   |  22  |  0.66    Ca    7.9<L>      31 Jul 2018 05:35  Phos  3.1     07-31  Mg     1.7     07-31    TPro  7.0  /  Alb  2.0<L>  /  TBili  2.4<H>  /  DBili  x   /  AST  34<H>  /  ALT  15  /  AlkPhos  95  07-31    Hemoglobin: 9.9 g/dL (07-31-18 @ 05:35)  Hemoglobin: 10.5 g/dL (07-30-18 @ 10:35)  Hemoglobin: 9.9 g/dL (07-30-18 @ 05:55)  Hemoglobin: 10.5 g/dL (07-29-18 @ 00:04)      LIVER FUNCTIONS - ( 31 Jul 2018 05:35 )  Alb: 2.0 g/dL / Pro: 7.0 g/dL / ALK PHOS: 95 u/L / ALT: 15 u/L / AST: 34 u/L / GGT: x           PT/INR - ( 31 Jul 2018 05:35 )   PT: 19.0 SEC;   INR: 1.64          PTT - ( 31 Jul 2018 05:35 )  PTT:37.0 SEC                            9.9    2.75  )-----------( 49       ( 31 Jul 2018 05:35 )             30.9                         10.5   3.97  )-----------( 60       ( 30 Jul 2018 10:35 )             32.9                         9.9    3.27  )-----------( 61       ( 30 Jul 2018 05:55 )             30.4                         10.5   5.4   )-----------( 76       ( 29 Jul 2018 00:04 )             32.9     Imaging:    < from: CT Abdomen and Pelvis w/ Oral Cont and w/ IV Cont (07.29.18 @ 02:14) >  EXAM:  CT ABDOMEN AND PELVIS OC IC                            PROCEDURE DATE:  07/29/2018          INTERPRETATION:  Abdominal/Pelvic CT    7/29/2018 2:19 AM    Indication: Upper abdominal pain, evaluate for colitis    Technique: Axial images were obtained following IV contrast from the lung   bases through pubic symphysis.  90 cc of Omnipaque 350 was administered   intravenously without complication and 10 cc was discarded.  Reformatted   coronal and sagittal images are submitted.    Comparison: None    FINDINGS:    LUNG BASES:  There are dependent atelectatic changes at the lung bases.  PERITONEUM:  There is no free air or focal collection.  No free fluid.  LIVER: Cirrhotic. Small from small subcentimeter focus near the dome too   small tocharacterize.  SPLEEN: The spleen is enlarged, measuring 13 x 16.2 x 7.7  cm.  GALLBLADDER: Distended with gallstones.  BILIARY TREE: The CBD measures up to 9 mm in the intrapancreatic segment  PANCREAS: Normal.  ADRENAL GLANDS: Normal.  KIDNEYS: Normal.  BOWEL: The stomach is incompletely distended.  There is no small bowel   obstruction, focal bowel wall thickening or diverticulitis. The appendix   is unremarkable.    URINARY BLADDER: Incompletely distended.  PELVIC ORGANS: Hysterectomy.    There is no significant adenopathy.  VASCULATURE: Large perisplenic varices. IVC filter.  RETROPERITONEUM:  There is no mass.  BONES: Intramedullary sangita in the right proximal femoral shaft. Mild grade   1 anterolisthesis of L4 and L5.  ABDOMINAL WALL: Unremarkable.    IMPRESSION:    No focal bowel wall thickening as clinically questioned.  Cirrhotic liver with an indeterminant subcentimeter focus near the dome   may be characterized by annual surveillance MRI.  Distended gallbladder containing gallstones. The CBD is dilated up to 9   mm. Recommend correlation with liver function tests. If clinically   indicated, further characterization with MRI/MRCP may be considered.  Perisplenic varices and splenomegaly indicative of portal hypertension.   Status post hysterectomy without small bowel obstruction.    < end of copied text >

## 2018-07-31 NOTE — PROGRESS NOTE ADULT - ASSESSMENT
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 On 7/30 with episode of AVNRT breaking with adenosine. PT currently undergoing GI w/u with MRCP.     Plan:   -role of ablation once pt's acute GI process is resolved   - if AVNRT recurs would suggest breaking rhythm with adenosine  - d/c beta blockers for now (will want to ablate AVNRT on this admission once GI issue resolves)   - f/u TTE   - continue to monitor on tele.     Thank you for this interesting consult.     Wade Dumas MD  EP fellow   x [phone]

## 2018-07-31 NOTE — PROGRESS NOTE ADULT - PROBLEM SELECTOR PLAN 1
s/p adenosine 6mg on 7/30, now in NSR, hold beta blockers for now per EP  Plan for AVNRT ablation this admission after GI issues resolved, HST negative  TTE pending, c/w tele monitoring

## 2018-07-31 NOTE — PROGRESS NOTE ADULT - ASSESSMENT
63F with h/o HCV, liver cirrhosis who presents with severe epigastric/RUQ abdominal pain, associated with nausea. Transferred from OSH for MRCP/ERCP for choledocholithiasis. US/CT revealed distended GB with gallstones and dilated CBD to 9 mm with lipase 6000s but no CT evidence of pancreatitis. Course c/b SVT to 160s on 7/30 s/p adenosine 6mg

## 2018-07-31 NOTE — PROGRESS NOTE ADULT - ASSESSMENT
63F PMHx HCV, cirrhosis presents from OSH with pancreatitis and ?choledocholithiasis pending MRCP. Cardiology consulted for symptomatic SVT lasting 2 hours on 7/30, spontaneously resolved. EKG at that time consistent w AVNRT. With recurrent episode last night now in NSR s/p adenosine push. Episodes of SVT likely 2/2 reentrant pathway exacerbated by pain 2/2 active GI issues.   -TTE pending  -Recs as per EP  -Monitor on tele. Adenosine 6mg IV as needed  -General cardiology will continue to follow pending TTE 63F PMHx HCV, cirrhosis presents from OSH with pancreatitis and ?choledocholithiasis pending MRCP. Cardiology consulted for symptomatic SVT lasting 2 hours on 7/30, spontaneously resolved. EKG at that time consistent w AVNRT. With recurrent episode last night now in NSR s/p adenosine push. Episodes of SVT likely 2/2 reentrant pathway exacerbated by pain 2/2 active GI issues.   -TTE pending  -Recs as per EP  -Monitor on tele  -General cardiology will continue to follow pending TTE

## 2018-07-31 NOTE — PROGRESS NOTE ADULT - SUBJECTIVE AND OBJECTIVE BOX
Patient seen and examined at bedside.    Overnight Events: yesterday evening with another of episode of AVNRT which broke with adenosine     Review Of Systems: No chest pain, shortness of breath, or palpitations            Medications:  lactated ringers. 1000 milliLiter(s) IV Continuous <Continuous>  metoprolol tartrate 25 milliGRAM(s) Oral two times a day  piperacillin/tazobactam IVPB. 3.375 Gram(s) IV Intermittent every 8 hours  rifaximin 550 milliGRAM(s) Oral two times a day      PAST MEDICAL & SURGICAL HISTORY:  Edema of both legs  Cirrhosis  Hepatitis C  S/P hysterectomy      Vitals:  T(F): 98.2 (07-31), Max: 98.5 (07-30)  HR: 75 (07-31) (69 - 154)  BP: 109/64 (07-31) (109/64 - 133/65)  RR: 18 (07-31)  SpO2: 100% (07-31)  I&O's Summary      Physical Exam:  Appearance: No acute distress; well appearing  Eyes: PERRL, EOMI, pink conjunctiva  HENT: Normal oral muscosa  Cardiovascular: RRR, S1, S2, no murmurs, rubs, or gallops; no edema; no JVD  Respiratory: Clear to auscultation bilaterally  Gastrointestinal: soft, non-tender, non-distended with normal bowel sounds  Musculoskeletal: No clubbing; no joint deformity   Neurologic: Non-focal  Lymphatic: No lymphadenopathy  Psychiatry: AAOx3, mood & affect appropriate  Skin: No rashes, ecchymoses, or cyanosis                          9.9    2.75  )-----------( 49       ( 31 Jul 2018 05:35 )             30.9     07-31    136  |  106  |  10  ----------------------------<  77  4.2   |  22  |  0.66    Ca    7.9<L>      31 Jul 2018 05:35  Phos  3.1     07-31  Mg     1.7     07-31    TPro  7.0  /  Alb  2.0<L>  /  TBili  2.4<H>  /  DBili  x   /  AST  34<H>  /  ALT  15  /  AlkPhos  95  07-31    PT/INR - ( 31 Jul 2018 05:35 )   PT: 19.0 SEC;   INR: 1.64          PTT - ( 31 Jul 2018 05:35 )  PTT:37.0 SEC  CARDIAC MARKERS ( 30 Jul 2018 09:15 )  x     / x     / x     / 65 u/L / 1.76 ng/mL / x      CARDIAC MARKERS ( 29 Jul 2018 00:04 )  x     / <0.015 ng/mL / x     / 103 U/L / x     / 1.8 ng/mL

## 2018-07-31 NOTE — PROGRESS NOTE ADULT - SUBJECTIVE AND OBJECTIVE BOX
Patient seen and examined at bedside.    Overnight Events: Pt had another episode of SVT to 155 at ~5:30pm last night. Pushed 6mg adenosine and broke into NSR in the 80s.     Review Of Systems:            Current Meds:  lactated ringers. 1000 milliLiter(s) IV Continuous <Continuous>  metoprolol tartrate 25 milliGRAM(s) Oral two times a day  piperacillin/tazobactam IVPB. 3.375 Gram(s) IV Intermittent every 8 hours  rifaximin 550 milliGRAM(s) Oral two times a day      Vitals:  T(F): 98.2 (07-31), Max: 98.5 (07-30)  HR: 75 (07-31) (69 - 154)  BP: 109/64 (07-31) (109/64 - 133/65)  RR: 18 (07-31)  SpO2: 100% (07-31)  I&O's Summary      Physical Exam:  GENERAL: No acute distress, well-developed  HEAD:  Atraumatic, Normocephalic  ENT: EOMI, conjunctiva and sclera clear, Neck supple, No JVD, moist mucosa  CHEST/LUNG: Clear to auscultation bilaterally; No wheeze, equal breath sounds bilaterally   HEART: Regular rate and rhythm; No murmurs, rubs, or gallops  ABDOMEN: Soft, Nontender, Nondistended; Bowel sounds present  EXTREMITIES:  No clubbing, cyanosis. RLE slightly edematous vs left, non-pitting, with overlying skin induration. Negative Jace's   PSYCH: Nl behavior, nl affect  NEUROLOGY: AAOx3, non-focal, cranial nerves intact                          9.9    2.75  )-----------( 49       ( 31 Jul 2018 05:35 )             30.9     07-31    136  |  106  |  10  ----------------------------<  77  4.2   |  22  |  0.66    Ca    7.9<L>      31 Jul 2018 05:35  Phos  3.1     07-31  Mg     1.7     07-31    TPro  7.0  /  Alb  2.0<L>  /  TBili  2.4<H>  /  DBili  x   /  AST  34<H>  /  ALT  15  /  AlkPhos  95  07-31    PT/INR - ( 31 Jul 2018 05:35 )   PT: 19.0 SEC;   INR: 1.64          PTT - ( 31 Jul 2018 05:35 )  PTT:37.0 SEC  CARDIAC MARKERS ( 30 Jul 2018 09:15 )  x     / x     / x     / 65 u/L / 1.76 ng/mL / x      CARDIAC MARKERS ( 29 Jul 2018 00:04 )  x     / <0.015 ng/mL / x     / 103 U/L / x     / 1.8 ng/mL    Bilateral LE doppler: no evidence of DVT    Interpretation of Telemetry:    TTE: pending Overnight Events: Pt had another episode of SVT to 155 at ~5:30pm last night. Pushed 6mg adenosine and broke into NSR in the 80s.     Patient seen and examined at bedside via [email or web address] ID [id number] and [id number]  Last night had squeezing substernal chest pain during the episode of SVT, which resolved after adenosine push. Since then has had no chest pain, palpitations, or SOB. Currently has no complaints. Pt is anxious to have GI evaluation of her gallstones soon.    Review Of Systems: Currently denies chest pain, palpitations, SOB.         Current Meds:  lactated ringers. 1000 milliLiter(s) IV Continuous <Continuous>  metoprolol tartrate 25 milliGRAM(s) Oral two times a day  piperacillin/tazobactam IVPB. 3.375 Gram(s) IV Intermittent every 8 hours  rifaximin 550 milliGRAM(s) Oral two times a day      Vitals:  T(F): 98.2 (07-31), Max: 98.5 (07-30)  HR: 75 (07-31) (69 - 154)  BP: 109/64 (07-31) (109/64 - 133/65)  RR: 18 (07-31)  SpO2: 100% (07-31)  I&O's Summary      Physical Exam:  GENERAL: No acute distress, well-developed  HEAD:  Atraumatic, Normocephalic  ENT: EOMI, conjunctiva and sclera clear, Neck supple, No JVD, moist mucosa  CHEST/LUNG: Clear to auscultation bilaterally; No wheeze, equal breath sounds bilaterally   HEART: Regular rate and rhythm; No murmurs, rubs, or gallops  ABDOMEN: Soft, NTND; hypoactive BS  EXTREMITIES:  No clubbing, cyanosis. RLE slightly edematous vs left, non-pitting, with overlying skin induration  PSYCH: Nl behavior, nl affect  NEUROLOGY: AAOx3, non-focal                        9.9    2.75  )-----------( 49       ( 31 Jul 2018 05:35 )             30.9     07-31    136  |  106  |  10  ----------------------------<  77  4.2   |  22  |  0.66    Ca    7.9<L>      31 Jul 2018 05:35  Phos  3.1     07-31  Mg     1.7     07-31    TPro  7.0  /  Alb  2.0<L>  /  TBili  2.4<H>  /  DBili  x   /  AST  34<H>  /  ALT  15  /  AlkPhos  95  07-31    PT/INR - ( 31 Jul 2018 05:35 )   PT: 19.0 SEC;   INR: 1.64          PTT - ( 31 Jul 2018 05:35 )  PTT:37.0 SEC  CARDIAC MARKERS ( 30 Jul 2018 09:15 )  x     / x     / x     / 65 u/L / 1.76 ng/mL / x      CARDIAC MARKERS ( 29 Jul 2018 00:04 )  x     / <0.015 ng/mL / x     / 103 U/L / x     / 1.8 ng/mL    Bilateral LE doppler: no evidence of DVT    Interpretation of Telemetry: 30 min sustained SVT at 5:30pm last night. Otherwise no events, sinus 80s     TTE: pending

## 2018-07-31 NOTE — PROGRESS NOTE ADULT - PROBLEM SELECTOR PLAN 6
- Likely secondary to cholelithiasis/cirrhosis   - LFTs stable, Tbili persistently elevated and in 2s since Feb 2018

## 2018-07-31 NOTE — PROGRESS NOTE ADULT - ASSESSMENT
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

## 2018-08-01 DIAGNOSIS — K81.0 ACUTE CHOLECYSTITIS: ICD-10-CM

## 2018-08-01 DIAGNOSIS — I81 PORTAL VEIN THROMBOSIS: ICD-10-CM

## 2018-08-01 LAB
BUN SERPL-MCNC: 8 MG/DL — SIGNIFICANT CHANGE UP (ref 7–23)
CALCIUM SERPL-MCNC: 8.3 MG/DL — LOW (ref 8.4–10.5)
CHLORIDE SERPL-SCNC: 104 MMOL/L — SIGNIFICANT CHANGE UP (ref 98–107)
CK SERPL-CCNC: 54 U/L — SIGNIFICANT CHANGE UP (ref 25–170)
CO2 SERPL-SCNC: 24 MMOL/L — SIGNIFICANT CHANGE UP (ref 22–31)
CREAT SERPL-MCNC: 0.74 MG/DL — SIGNIFICANT CHANGE UP (ref 0.5–1.3)
GLUCOSE SERPL-MCNC: 82 MG/DL — SIGNIFICANT CHANGE UP (ref 70–99)
HCT VFR BLD CALC: 32.7 % — LOW (ref 34.5–45)
HGB BLD-MCNC: 10.4 G/DL — LOW (ref 11.5–15.5)
MCHC RBC-ENTMCNC: 30.5 PG — SIGNIFICANT CHANGE UP (ref 27–34)
MCHC RBC-ENTMCNC: 31.8 % — LOW (ref 32–36)
MCV RBC AUTO: 95.9 FL — SIGNIFICANT CHANGE UP (ref 80–100)
NRBC # FLD: 0 — SIGNIFICANT CHANGE UP
PLATELET # BLD AUTO: 56 K/UL — LOW (ref 150–400)
PMV BLD: 10.7 FL — SIGNIFICANT CHANGE UP (ref 7–13)
POTASSIUM SERPL-MCNC: 4.4 MMOL/L — SIGNIFICANT CHANGE UP (ref 3.5–5.3)
POTASSIUM SERPL-SCNC: 4.4 MMOL/L — SIGNIFICANT CHANGE UP (ref 3.5–5.3)
RBC # BLD: 3.41 M/UL — LOW (ref 3.8–5.2)
RBC # FLD: 18.2 % — HIGH (ref 10.3–14.5)
SODIUM SERPL-SCNC: 135 MMOL/L — SIGNIFICANT CHANGE UP (ref 135–145)
TROPONIN T, HIGH SENSITIVITY: 7 NG/L — SIGNIFICANT CHANGE UP (ref ?–14)
TROPONIN T, HIGH SENSITIVITY: 8 NG/L — SIGNIFICANT CHANGE UP (ref ?–14)
WBC # BLD: 2.97 K/UL — LOW (ref 3.8–10.5)
WBC # FLD AUTO: 2.97 K/UL — LOW (ref 3.8–10.5)

## 2018-08-01 PROCEDURE — 99233 SBSQ HOSP IP/OBS HIGH 50: CPT

## 2018-08-01 PROCEDURE — 99232 SBSQ HOSP IP/OBS MODERATE 35: CPT | Mod: GC

## 2018-08-01 RX ORDER — WARFARIN SODIUM 2.5 MG/1
5 TABLET ORAL ONCE
Qty: 0 | Refills: 0 | Status: COMPLETED | OUTPATIENT
Start: 2018-08-01 | End: 2018-08-01

## 2018-08-01 RX ORDER — OXYCODONE AND ACETAMINOPHEN 5; 325 MG/1; MG/1
1 TABLET ORAL ONCE
Qty: 0 | Refills: 0 | Status: DISCONTINUED | OUTPATIENT
Start: 2018-08-01 | End: 2018-08-01

## 2018-08-01 RX ORDER — METOPROLOL TARTRATE 50 MG
25 TABLET ORAL
Qty: 0 | Refills: 0 | Status: DISCONTINUED | OUTPATIENT
Start: 2018-08-01 | End: 2018-08-03

## 2018-08-01 RX ORDER — ENOXAPARIN SODIUM 100 MG/ML
90 INJECTION SUBCUTANEOUS EVERY 12 HOURS
Qty: 0 | Refills: 0 | Status: DISCONTINUED | OUTPATIENT
Start: 2018-08-01 | End: 2018-08-03

## 2018-08-01 RX ORDER — APIXABAN 2.5 MG/1
2 TABLET, FILM COATED ORAL
Qty: 120 | Refills: 0 | OUTPATIENT
Start: 2018-08-01 | End: 2018-08-30

## 2018-08-01 RX ADMIN — LACTULOSE 20 GRAM(S): 10 SOLUTION ORAL at 21:13

## 2018-08-01 RX ADMIN — Medication 40 MILLIGRAM(S): at 05:24

## 2018-08-01 RX ADMIN — WARFARIN SODIUM 5 MILLIGRAM(S): 2.5 TABLET ORAL at 17:27

## 2018-08-01 RX ADMIN — SPIRONOLACTONE 50 MILLIGRAM(S): 25 TABLET, FILM COATED ORAL at 05:25

## 2018-08-01 RX ADMIN — Medication 25 MILLIGRAM(S): at 17:27

## 2018-08-01 RX ADMIN — OXYCODONE AND ACETAMINOPHEN 1 TABLET(S): 5; 325 TABLET ORAL at 00:56

## 2018-08-01 RX ADMIN — PIPERACILLIN AND TAZOBACTAM 25 GRAM(S): 4; .5 INJECTION, POWDER, LYOPHILIZED, FOR SOLUTION INTRAVENOUS at 05:25

## 2018-08-01 RX ADMIN — PIPERACILLIN AND TAZOBACTAM 25 GRAM(S): 4; .5 INJECTION, POWDER, LYOPHILIZED, FOR SOLUTION INTRAVENOUS at 21:14

## 2018-08-01 RX ADMIN — OXYCODONE AND ACETAMINOPHEN 1 TABLET(S): 5; 325 TABLET ORAL at 01:56

## 2018-08-01 RX ADMIN — PIPERACILLIN AND TAZOBACTAM 25 GRAM(S): 4; .5 INJECTION, POWDER, LYOPHILIZED, FOR SOLUTION INTRAVENOUS at 13:36

## 2018-08-01 RX ADMIN — ENOXAPARIN SODIUM 90 MILLIGRAM(S): 100 INJECTION SUBCUTANEOUS at 17:27

## 2018-08-01 NOTE — PROGRESS NOTE ADULT - PROBLEM SELECTOR PLAN 7
LE US negative for DVT - Chronic. 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

## 2018-08-01 NOTE — CHART NOTE - NSCHARTNOTEFT_GEN_A_CORE
DOAC not covered by insurance which is emergency medicaid. Still awaiting EGD report from [facility] (spoke to Mrs. Davis). Once EGD report obtained and if negative, will start patient on therapeutic lovenox inpatient with transition to coumadin. Was discussed with KATHLEEN Cohen and hepatology fellow, Dr. Vanegas

## 2018-08-01 NOTE — PROGRESS NOTE ADULT - PROBLEM SELECTOR PLAN 4
- No evidence of bleeding, Elevated INR likely secondary to cirrhosis  - H/H stable, check iron panel, b12, folate - Likely secondary to chronic hepatitis, MELD-Na 16  - Complicated by coagulopathy, hypoalbuminemia, thrombocytopenia  evidence of portal HTN on CT with splenomegaly  - Continue rifaximin, resumed diuretics and lactulose  EGD normal per patient, performed at OSH, hepatology signed off

## 2018-08-01 NOTE — PROGRESS NOTE ADULT - ASSESSMENT
Impression:  1) RUQ pain: MRCP with no biliary pathology; Advanced team following and recommending surgical eval for cholecystectomy  2) Cirrhosis: Positive Hep C Ab, negative HCV RNA in 2/2018.   Ascites: No ascites on CT A/P on 7/29  Varices: EGD normal per patient, performed at OSH  HCC: CT A/P on 7/29 showing subcentimeter focus near the dome; Abd U/S on 7/28 with no lesions  PVT: Intra-hepatic portal vein thrombosis seen on MRCP on 7/31/18  MELD-Na (8/1/18): 17    Recommendations:  - Obtain OSH EGD report  - Recommend systemic anti-coagulation for treatment of PVT pending EGD report    Shad Vanegas MD  Gastroenterology Fellow  Pager number: [phone] / [phone] Impression:  1) RUQ pain: MRCP with no biliary pathology; Advanced team following and recommending surgical eval for cholecystectomy  2) Cirrhosis: Positive Hep C Ab, negative HCV RNA in 2/2018.   Ascites: No ascites on CT A/P on 7/29  Varices: EGD normal per patient, performed at OSH  HCC: CT A/P on 7/29 showing subcentimeter focus near the dome; Abd U/S on 7/28 with no lesions  PVT: Intra-hepatic portal vein thrombosis seen on MRCP on 7/31/18  MELD-Na (8/1/18): 17    Recommendations:  - Obtain EGD report from [facility] performed 2 to 3 weeks ago per patient  - Recommend systemic anti-coagulation for treatment of PVT pending EGD report    Shad Vanegas MD  Gastroenterology Fellow  Pager number: [phone] / [phone]

## 2018-08-01 NOTE — PROGRESS NOTE ADULT - SUBJECTIVE AND OBJECTIVE BOX
Patient is a 63y old  Female who presents with a chief complaint of ?Choledocholithiasis (30 Jul 2018 09:07)      SUBJECTIVE / OVERNIGHT EVENTS: : [id number]. Reported left breast pain overnight which patient states she had surgery on before and with the US testing they did caused more pain. No events on tele. No chest pain, SOB, N/V/D, tolerated clears. RUQ only mild today. No other complaints.    MEDICATIONS  (STANDING):  furosemide    Tablet 40 milliGRAM(s) Oral daily  lactulose Syrup 20 Gram(s) Oral at bedtime  metoprolol tartrate 25 milliGRAM(s) Oral two times a day  piperacillin/tazobactam IVPB. 3.375 Gram(s) IV Intermittent every 8 hours  rifaximin 550 milliGRAM(s) Oral two times a day  spironolactone 50 milliGRAM(s) Oral daily    MEDICATIONS  (PRN):      T(C): 36.9 (08-01-18 @ 05:22), Max: 36.9 (07-31-18 @ 22:00)  HR: 67 (08-01-18 @ 05:22) (62 - 68)  BP: 110/66 (08-01-18 @ 05:22) (94/57 - 110/66)  RR: 18 (08-01-18 @ 05:22) (16 - 18)  SpO2: 100% (08-01-18 @ 05:22) (97% - 100%)  CAPILLARY BLOOD GLUCOSE        I&O's Summary    PHYSICAL EXAM:  GENERAL: no apparent distress, on room air, found resting comfortably  EYES: sclera clear b/l  CHEST/LUNG: Clear to auscultation bilaterally; No wheezing or crackles  HEART: s1/s2, regular rate and rhythm, no murmurs appreciated  ABDOMEN: Soft, mild RUQ pain, no guarding or grimacing, + bowel sounds  EXTREMITIES: No clubbing, cyanosis, dark hyperpigmentation of b/l lower extremities nontender to palpation with some swelling  NEUROLOGY: awake, alert, responds to Qs appropriately, no gross deficits    LABS:                        10.4   2.97  )-----------( 56       ( 01 Aug 2018 06:28 )             32.7     08-01    135  |  104  |  8   ----------------------------<  82  4.4   |  24  |  0.74    Ca    8.3<L>      01 Aug 2018 06:28  Phos  3.1     07-31  Mg     1.7     07-31    TPro  7.0  /  Alb  2.0<L>  /  TBili  2.4<H>  /  DBili  x   /  AST  34<H>  /  ALT  15  /  AlkPhos  95  07-31    PT/INR - ( 31 Jul 2018 05:35 )   PT: 19.0 SEC;   INR: 1.64          PTT - ( 31 Jul 2018 05:35 )  PTT:37.0 SEC  CARDIAC MARKERS ( 01 Aug 2018 00:50 )  x     / x     / 54 u/L / x     / x              RADIOLOGY & ADDITIONAL TESTS:

## 2018-08-01 NOTE — PROGRESS NOTE ADULT - PROBLEM SELECTOR PLAN 1
- US abdomen and CT abdomen/pelvis with gallstones, distended gallbladder and ?dilated CBD to 9mm, LFTs stable  - MRCP with acute cholecystitis without choledocholithiasis, portal HTN and intrahepatic portal vein thrombosis with cavernous transformation  - On Zosyn day 4, can switch to Cipro/Flagyl for one more day on DC to complete 5 days total, outpatient surgery followup - US abdomen and CT abdomen/pelvis with gallstones, distended gallbladder and ?dilated CBD to 9mm, LFTs stable  - MRCP with acute cholecystitis without choledocholithiasis, portal HTN and intrahepatic portal vein thrombosis with cavernous transformation  - On Zosyn day 4, can switch to Cipro/Flagyl for one more day on DC to complete 5 days total, outpatient surgery followup-no plan for inpatient CCY, Pain now improved and tolerating diet

## 2018-08-01 NOTE — CHART NOTE - NSCHARTNOTEFT_GEN_A_CORE
GI Note:    MRCP reviewed with radiology, no evidence of choledocolithiasis.   No indication for ERCP at this time.    Would f/u surgery recs regarding cholecystectomy- if not a candidate may need to consult IR regarding cholecystectomy tube.    GI will sign off    Please call with questions    Please call with questions  Gosia Duarte  GI Fellow  Pager: [phone] GI Note:    MRCP reviewed with radiology, no evidence of choledocolithiasis.   No indication for ERCP at this time.    Would f/u surgery recs regarding cholecystectomy.   Patient has been evaluated by hepatology during this admission. Stable from cirrhosis standpoint.     GI will sign off    Please call with questions    Please call with questions  Gosia Duarte  GI Fellow  Pager: [phone]

## 2018-08-01 NOTE — PROGRESS NOTE ADULT - SUBJECTIVE AND OBJECTIVE BOX
Patient seen and examined at bedside.    Overnight Events: no further AVNRT noted on tele . MRCP done, revealing acute jeff with portal vein thrombosis.     Review Of Systems: No chest pain, shortness of breath, or palpitations            Medications:  MEDICATIONS  (STANDING):  furosemide    Tablet 40 milliGRAM(s) Oral daily  lactulose Syrup 20 Gram(s) Oral at bedtime  piperacillin/tazobactam IVPB. 3.375 Gram(s) IV Intermittent every 8 hours  rifaximin 550 milliGRAM(s) Oral two times a day  spironolactone 50 milliGRAM(s) Oral daily      PAST MEDICAL & SURGICAL HISTORY:  Edema of both legs  Cirrhosis  Hepatitis C  S/P hysterectomy      Vitals:  Vital Signs Last 24 Hrs  T(C): 36.9 (01 Aug 2018 05:22), Max: 36.9 (31 Jul 2018 22:00)  T(F): 98.4 (01 Aug 2018 05:22), Max: 98.5 (31 Jul 2018 22:00)  HR: 67 (01 Aug 2018 05:22) (62 - 68)  BP: 110/66 (01 Aug 2018 05:22) (94/57 - 110/66)  BP(mean): --  RR: 18 (01 Aug 2018 05:22) (16 - 18)  SpO2: 100% (01 Aug 2018 05:22) (97% - 100%)    Physical Exam:  Appearance: No acute distress; well appearing  Eyes: PERRL, EOMI, pink conjunctiva  HENT: Normal oral muscosa  Cardiovascular: RRR, S1, S2, no murmurs, rubs, or gallops; no edema; no JVD  Respiratory: Clear to auscultation bilaterally  Gastrointestinal: soft, non-tender, non-distended with normal bowel sounds  Musculoskeletal: No clubbing; no joint deformity   Neurologic: Non-focal  Lymphatic: No lymphadenopathy  Psychiatry: AAOx3, mood & affect appropriate  Skin: No rashes, ecchymoses, or cyanosis                                     10.4   2.97  )-----------( 56       ( 01 Aug 2018 06:28 )             32.7   08-01    135  |  104  |  8   ----------------------------<  82  4.4   |  24  |  0.74    Ca    8.3<L>      01 Aug 2018 06:28  Phos  3.1     07-31  Mg     1.7     07-31    TPro  7.0  /  Alb  2.0<L>  /  TBili  2.4<H>  /  DBili  x   /  AST  34<H>  /  ALT  15  /  AlkPhos  95  07-31      < from: MR MRCP No Cont (07.31.18 @ 17:47) >    IMPRESSION:   1.  Liver cirrhosis with portal hypertension. 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 Patent main portal and hepatic   veins. No HCC.  2.  Cholelithiasis with wall thickening and pericholecystic fluid,   concerning for acute cholecystitis.  3.  No biliary distention. No choledocholithiasis.    < end of copied text >

## 2018-08-01 NOTE — PROGRESS NOTE ADULT - ASSESSMENT
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 On 7/30 with episode of AVNRT breaking with adenosine. PT currently undergoing GI w/u with MRCP revealing now acute cholecystitis, portal vein thrombosis.     Plan:   - if AVNRT recurs would suggest breaking rhythm with adenosine  - would start low dose beta blockers 25 mg Lopressor BID  - no imminent plans for AVNRT ablation on this admission, plan for outpt EP follow up .   - continue to monitor on tele.     - please reconsult with new questions.     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 On 7/30 with episode of AVNRT breaking with adenosine. PT currently undergoing GI w/u with MRCP revealing now acute cholecystitis, portal vein thrombosis.     Plan:   - if AVNRT recurs would suggest breaking rhythm with adenosine  - would start low dose beta blockers 25 mg Lopressor BID  - no imminent plans for AVNRT ablation on this admission, plan for outpt EP follow up .   - continue to monitor on tele.   OUTPT APPOINTMENT FOR EP DR. CONKLIN, SEPT 6, 2018 AT 10AM, please inform patient       - please reconsult with new questions.     Wade Dumas MD  EP fellow   x [phone]

## 2018-08-01 NOTE — PROGRESS NOTE ADULT - ATTENDING COMMENTS
Dispo: pending GI clearance prior to DC and if tolerating regular diet. Stable for DC. DC time: 35 min

## 2018-08-01 NOTE — PROGRESS NOTE ADULT - PROBLEM SELECTOR PLAN 5
- Likely secondary to cholelithiasis/cirrhosis   - LFTs stable, Tbili persistently elevated and in 2s since Feb 2018 - No evidence of bleeding, Elevated INR likely secondary to cirrhosis  - H/H stable, check iron panel, b12, folate

## 2018-08-01 NOTE — PROGRESS NOTE ADULT - SUBJECTIVE AND OBJECTIVE BOX
Chief Complaint:  Patient is a 63y old  Female who presents with a chief complaint of ?Choledocholithiasis (30 Jul 2018 09:07)    Interval Events:   - Patient feels well. She denies N/V and abdominal pain.  - Patient found to have intra-hepatic portal vein thrombosis on MRCP / MRI abdomen    Allergies:  No Known Allergies    Hospital Medications:  furosemide    Tablet 40 milliGRAM(s) Oral daily  lactulose Syrup 20 Gram(s) Oral at bedtime  metoprolol tartrate 25 milliGRAM(s) Oral two times a day  piperacillin/tazobactam IVPB. 3.375 Gram(s) IV Intermittent every 8 hours  rifaximin 550 milliGRAM(s) Oral two times a day  spironolactone 50 milliGRAM(s) Oral daily    PMHX/PSHX:  Edema of both legs  Cirrhosis  Bronchitis  Hepatitis C  S/P hysterectomy    Family history:  No pertinent family history in first degree relatives    ROS:     General:  No  fevers, chills  CV:  No pain, palpitations  Pulm:  No dyspnea, cough  GI:  No pain, No nausea, No vomiting, No diarrhea, No constipation, No weight loss, No fever, No pruritis, No rectal bleeding, No tarry stools, No dysphagia,  :  No pain, bleeding    PHYSICAL EXAM:   Vital Signs:  Vital Signs Last 24 Hrs  T(C): 36.8 (01 Aug 2018 13:42), Max: 36.9 (31 Jul 2018 22:00)  T(F): 98.3 (01 Aug 2018 13:42), Max: 98.5 (31 Jul 2018 22:00)  HR: 69 (01 Aug 2018 13:42) (62 - 69)  BP: 105/45 (01 Aug 2018 13:42) (94/57 - 110/66)  BP(mean): --  RR: 16 (01 Aug 2018 13:42) (16 - 18)  SpO2: 100% (01 Aug 2018 13:42) (97% - 100%)    GENERAL: NAD  HEENT:  NC/AT, no scleral icterus  CHEST:  CTAB, no w/r/r  HEART:  RRR, no m/r/g  ABDOMEN:  Soft, NT/ND, NABS,  no masses ,no hepato-splenomegaly, no signs of chronic liver disease  EXTREMITIES: No edema  SKIN:  No rash  NEURO:  AAO x 3, no asterixis    LABS:                        10.4   2.97  )-----------( 56       ( 01 Aug 2018 06:28 )             32.7     Mean Cell Volume: 95.9 fL (08-01-18 @ 06:28)    08-01    135  |  104  |  8   ----------------------------<  82  4.4   |  24  |  0.74    Ca    8.3<L>      01 Aug 2018 06:28  Phos  3.1     07-31  Mg     1.7     07-31    TPro  7.0  /  Alb  2.0<L>  /  TBili  2.4<H>  /  DBili  x   /  AST  34<H>  /  ALT  15  /  AlkPhos  95  07-31    LIVER FUNCTIONS - ( 31 Jul 2018 05:35 )  Alb: 2.0 g/dL / Pro: 7.0 g/dL / ALK PHOS: 95 u/L / ALT: 15 u/L / AST: 34 u/L / GGT: x           PT/INR - ( 31 Jul 2018 05:35 )   PT: 19.0 SEC;   INR: 1.64          PTT - ( 31 Jul 2018 05:35 )  PTT:37.0 SEC                            10.4   2.97  )-----------( 56       ( 01 Aug 2018 06:28 )             32.7                         9.9    2.75  )-----------( 49       ( 31 Jul 2018 05:35 )             30.9                         10.5   3.97  )-----------( 60       ( 30 Jul 2018 10:35 )             32.9                         9.9    3.27  )-----------( 61       ( 30 Jul 2018 05:55 )             30.4     Imaging:    < from: MR MRCP No Cont (07.31.18 @ 17:47) >  IMPRESSION:   1.  Liver cirrhosis with portal hypertension. 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 Patent main portal and hepatic   veins. No HCC.  2.  Cholelithiasis with wall thickening and pericholecystic fluid,   concerning for acute cholecystitis.  3.  No biliary distention. No choledocholithiasis.    < end of copied text >

## 2018-08-01 NOTE — PROGRESS NOTE ADULT - PROBLEM SELECTOR PLAN 9
- Holding pharmacological ppx in the setting of thrombocytopenia   - Venodynes - Chronic anemia in setting of cirrhosis, H/H stable  iron panel, b12, folate WNL

## 2018-08-01 NOTE — PROGRESS NOTE ADULT - PROBLEM SELECTOR PLAN 6
- Chronic. Likely complication of cirrhosis due to splenic sequestration  - Monitor platelet and for signs of bleeding - Likely secondary to cholelithiasis/cirrhosis   - LFTs stable, Tbili persistently elevated and in 2s since Feb 2018

## 2018-08-01 NOTE — PROGRESS NOTE ADULT - PROBLEM SELECTOR PLAN 8
- Chronic anemia in setting of cirrhosis, H/H stable  iron panel, b12, folate WNL LE US negative for DVT

## 2018-08-01 NOTE — CHART NOTE - NSCHARTNOTEFT_GEN_A_CORE
d/w surgery team - re: MRCP results- not a candidate for surgery at this time due to multiple co morbidities . If patient with severe pain will selvin to consult IR d/w surgery team - re: MRCP results- not a candidate for surgery at this time due to multiple co morbidities . If patient with severe pain will selvin to consult IR  discussed with Dr. Shepherd- will start lovenox / coumadin tonight

## 2018-08-01 NOTE — PROGRESS NOTE ADULT - PROBLEM SELECTOR PLAN 3
- Likely secondary to chronic hepatitis, MELD-Na 16  - Complicated by coagulopathy, hypoalbuminemia, thrombocytopenia  evidence of portal HTN on CT with splenomegaly  - Continue rifaximin, resumed diuretics and lactulose  EGD normal per patient, performed at OSH, hepatology signed off MRCP with intrahepatic portal vein thrombosis with cavernous transformation. If chronic, may not benefit from anticoagulation. Spoke to hepatology fellow who will d/w attending. In meantime will obtain EGD report from [place] and check cost of Eliquis with [facility]

## 2018-08-02 LAB
APTT BLD: 49.5 SEC — HIGH (ref 27.5–37.4)
HCV RNA SERPL NAA DL=5-ACNC: NOT DETECTED — SIGNIFICANT CHANGE UP
HCV RNA SPEC NAA+PROBE-LOG IU: SIGNIFICANT CHANGE UP LOGIU/ML
INR BLD: 1.87 — HIGH (ref 0.88–1.17)
PROTHROM AB SERPL-ACNC: 21.8 SEC — HIGH (ref 9.8–13.1)

## 2018-08-02 PROCEDURE — 99233 SBSQ HOSP IP/OBS HIGH 50: CPT

## 2018-08-02 RX ORDER — ENOXAPARIN SODIUM 100 MG/ML
90 INJECTION SUBCUTANEOUS
Qty: 5 | Refills: 0 | OUTPATIENT
Start: 2018-08-02 | End: 2018-08-06

## 2018-08-02 RX ORDER — WARFARIN SODIUM 2.5 MG/1
5 TABLET ORAL ONCE
Qty: 0 | Refills: 0 | Status: COMPLETED | OUTPATIENT
Start: 2018-08-02 | End: 2018-08-02

## 2018-08-02 RX ADMIN — LACTULOSE 20 GRAM(S): 10 SOLUTION ORAL at 21:55

## 2018-08-02 RX ADMIN — Medication 40 MILLIGRAM(S): at 06:53

## 2018-08-02 RX ADMIN — SPIRONOLACTONE 50 MILLIGRAM(S): 25 TABLET, FILM COATED ORAL at 06:53

## 2018-08-02 RX ADMIN — ENOXAPARIN SODIUM 90 MILLIGRAM(S): 100 INJECTION SUBCUTANEOUS at 06:53

## 2018-08-02 RX ADMIN — PIPERACILLIN AND TAZOBACTAM 25 GRAM(S): 4; .5 INJECTION, POWDER, LYOPHILIZED, FOR SOLUTION INTRAVENOUS at 14:11

## 2018-08-02 RX ADMIN — Medication 25 MILLIGRAM(S): at 06:53

## 2018-08-02 RX ADMIN — ENOXAPARIN SODIUM 90 MILLIGRAM(S): 100 INJECTION SUBCUTANEOUS at 18:22

## 2018-08-02 RX ADMIN — PIPERACILLIN AND TAZOBACTAM 25 GRAM(S): 4; .5 INJECTION, POWDER, LYOPHILIZED, FOR SOLUTION INTRAVENOUS at 21:55

## 2018-08-02 RX ADMIN — PIPERACILLIN AND TAZOBACTAM 25 GRAM(S): 4; .5 INJECTION, POWDER, LYOPHILIZED, FOR SOLUTION INTRAVENOUS at 06:53

## 2018-08-02 RX ADMIN — WARFARIN SODIUM 5 MILLIGRAM(S): 2.5 TABLET ORAL at 18:22

## 2018-08-02 NOTE — PROGRESS NOTE ADULT - PROBLEM SELECTOR PLAN 1
MRCP with intrahepatic portal vein thrombosis with cavernous transformation.   Needs systemic A/C per hepatology, currently on lovenox to coumadin bridge  DOACs not covered by insurance. Will check lovenox cost MRCP with intrahepatic portal vein thrombosis with cavernous transformation.   Needs systemic A/C per hepatology, currently on lovenox to coumadin bridge  DOACs not covered by insurance. Will check lovenox cost  Prior EGD report obtained from [place] placed in chart: Grad 1 distal esophageal varices and gastric body varices. Report discussed with hepatology, Ok to c/w A/C

## 2018-08-02 NOTE — PROGRESS NOTE ADULT - PROBLEM SELECTOR PLAN 4
- Likely secondary to chronic hepatitis, MELD-Na 16  - Complicated by coagulopathy, hypoalbuminemia, thrombocytopenia  evidence of portal HTN on CT with splenomegaly  - Continue rifaximin, resumed diuretics and lactulose  EGD normal per patient, performed at OSH, hepatology signed off

## 2018-08-02 NOTE — PROGRESS NOTE ADULT - ASSESSMENT
63F with h/o HCV, liver cirrhosis who presents with severe epigastric/RUQ abdominal pain, associated with nausea. Transferred from OSH for MRCP/ERCP for choledocholithiasis. US/CT revealed distended GB with gallstones and dilated CBD to 9 mm with lipase 6000s but no CT evidence of pancreatitis. Course c/b SVT to 160s on 7/30 s/p adenosine 6mg 63F with h/o HCV, liver cirrhosis who presents with severe epigastric/RUQ abdominal pain, associated with nausea. Transferred from OSH for MRCP/ERCP to r/o choledocholithiasis. US/CT revealed distended GB with gallstones and dilated CBD to 9 mm with lipase 6000s but no CT evidence of pancreatitis. MRCP confirmed acute cholecystitis without choledocholithiasis and finding of intrahepatic vein thrombosis. Course c/b SVT to 160s on 7/30 s/p adenosine 6mg, now in NSR.

## 2018-08-02 NOTE — PROGRESS NOTE ADULT - PROBLEM SELECTOR PLAN 3
s/p adenosine 6mg on 7/30, now in NSR, no further tele events  restarted metoprolol 25mg BID per EP recs, no ablation plan this admission  HST negative  TTE unremarkable

## 2018-08-02 NOTE — PROGRESS NOTE ADULT - SUBJECTIVE AND OBJECTIVE BOX
INCOMPLETE Patient is a 63y old  Female who presents with a chief complaint of ?Choledocholithiasis (30 Jul 2018 09:07)      SUBJECTIVE / OVERNIGHT EVENTS: : [id number]. No acute events overnight. No events on tele. No chest pain, SOB, N//V/D. Pain in abdomen improved. States she has an appt with her liver specialist today but doesn't recall name of doctor or the number.    MEDICATIONS  (STANDING):  enoxaparin Injectable 90 milliGRAM(s) SubCutaneous every 12 hours  furosemide    Tablet 40 milliGRAM(s) Oral daily  lactulose Syrup 20 Gram(s) Oral at bedtime  metoprolol tartrate 25 milliGRAM(s) Oral two times a day  piperacillin/tazobactam IVPB. 3.375 Gram(s) IV Intermittent every 8 hours  rifaximin 550 milliGRAM(s) Oral two times a day  spironolactone 50 milliGRAM(s) Oral daily  warfarin 5 milliGRAM(s) Oral once    MEDICATIONS  (PRN):      T(C): 36.3 (08-02-18 @ 06:51), Max: 36.8 (08-01-18 @ 13:42)  HR: 63 (08-02-18 @ 06:51) (63 - 76)  BP: 105/64 (08-02-18 @ 06:51) (102/63 - 109/68)  RR: 18 (08-02-18 @ 06:51) (16 - 18)  SpO2: 100% (08-02-18 @ 06:51) (100% - 100%)  CAPILLARY BLOOD GLUCOSE        I&O's Summary      PHYSICAL EXAM:  GENERAL: no apparent distress, on room air, eating breakfast  EYES: sclera clear b/l  CHEST/LUNG: Clear to auscultation bilaterally; No wheezing or crackles  HEART: s1/s2, regular rate and rhythm, no murmurs appreciated  ABDOMEN: Soft, mild RUQ pain TTP, no guarding or grimacing, + bowel sounds  EXTREMITIES: No clubbing, cyanosis, dark hyperpigmentation of b/l lower extremities nontender to palpation with some swelling  NEUROLOGY: awake, alert, responds to Qs appropriately, no gross deficits    LABS:                        10.4   2.97  )-----------( 56       ( 01 Aug 2018 06:28 )             32.7     08-01    135  |  104  |  8   ----------------------------<  82  4.4   |  24  |  0.74    Ca    8.3<L>      01 Aug 2018 06:28      PT/INR - ( 02 Aug 2018 07:40 )   PT: 21.8 SEC;   INR: 1.87          PTT - ( 02 Aug 2018 07:40 )  PTT:49.5 SEC  CARDIAC MARKERS ( 01 Aug 2018 00:50 )  x     / x     / 54 u/L / x     / x              RADIOLOGY & ADDITIONAL TESTS:

## 2018-08-02 NOTE — PROGRESS NOTE ADULT - PROBLEM SELECTOR PLAN 10
- Holding pharmacological ppx in the setting of thrombocytopenia   - Venodynes On lovenox to coumadin bridge

## 2018-08-03 VITALS
RESPIRATION RATE: 18 BRPM | HEART RATE: 63 BPM | TEMPERATURE: 98 F | DIASTOLIC BLOOD PRESSURE: 52 MMHG | SYSTOLIC BLOOD PRESSURE: 104 MMHG | OXYGEN SATURATION: 100 %

## 2018-08-03 LAB
APTT BLD: 62.5 SEC — HIGH (ref 27.5–37.4)
BUN SERPL-MCNC: 12 MG/DL — SIGNIFICANT CHANGE UP (ref 7–23)
CALCIUM SERPL-MCNC: 8.5 MG/DL — SIGNIFICANT CHANGE UP (ref 8.4–10.5)
CHLORIDE SERPL-SCNC: 105 MMOL/L — SIGNIFICANT CHANGE UP (ref 98–107)
CO2 SERPL-SCNC: 23 MMOL/L — SIGNIFICANT CHANGE UP (ref 22–31)
CREAT SERPL-MCNC: 0.73 MG/DL — SIGNIFICANT CHANGE UP (ref 0.5–1.3)
GLUCOSE SERPL-MCNC: 80 MG/DL — SIGNIFICANT CHANGE UP (ref 70–99)
HCT VFR BLD CALC: 36.2 % — SIGNIFICANT CHANGE UP (ref 34.5–45)
HGB BLD-MCNC: 11.6 G/DL — SIGNIFICANT CHANGE UP (ref 11.5–15.5)
INR BLD: 2.27 — HIGH (ref 0.88–1.17)
MAGNESIUM SERPL-MCNC: 1.4 MG/DL — LOW (ref 1.6–2.6)
MCHC RBC-ENTMCNC: 30.9 PG — SIGNIFICANT CHANGE UP (ref 27–34)
MCHC RBC-ENTMCNC: 32 % — SIGNIFICANT CHANGE UP (ref 32–36)
MCV RBC AUTO: 96.5 FL — SIGNIFICANT CHANGE UP (ref 80–100)
NRBC # FLD: 0 — SIGNIFICANT CHANGE UP
PLATELET # BLD AUTO: 62 K/UL — LOW (ref 150–400)
PMV BLD: 10.5 FL — SIGNIFICANT CHANGE UP (ref 7–13)
POTASSIUM SERPL-MCNC: 4 MMOL/L — SIGNIFICANT CHANGE UP (ref 3.5–5.3)
POTASSIUM SERPL-SCNC: 4 MMOL/L — SIGNIFICANT CHANGE UP (ref 3.5–5.3)
PROTHROM AB SERPL-ACNC: 25.6 SEC — HIGH (ref 9.8–13.1)
RBC # BLD: 3.75 M/UL — LOW (ref 3.8–5.2)
RBC # FLD: 18 % — HIGH (ref 10.3–14.5)
SODIUM SERPL-SCNC: 137 MMOL/L — SIGNIFICANT CHANGE UP (ref 135–145)
WBC # BLD: 2.95 K/UL — LOW (ref 3.8–10.5)
WBC # FLD AUTO: 2.95 K/UL — LOW (ref 3.8–10.5)

## 2018-08-03 PROCEDURE — 99233 SBSQ HOSP IP/OBS HIGH 50: CPT

## 2018-08-03 RX ORDER — FUROSEMIDE 40 MG
1 TABLET ORAL
Qty: 30 | Refills: 0 | OUTPATIENT
Start: 2018-08-03 | End: 2018-09-01

## 2018-08-03 RX ORDER — MAGNESIUM SULFATE 500 MG/ML
2 VIAL (ML) INJECTION ONCE
Qty: 0 | Refills: 0 | Status: COMPLETED | OUTPATIENT
Start: 2018-08-03 | End: 2018-08-03

## 2018-08-03 RX ORDER — WARFARIN SODIUM 2.5 MG/1
1 TABLET ORAL
Qty: 30 | Refills: 0
Start: 2018-08-03 | End: 2018-09-01

## 2018-08-03 RX ORDER — LACTULOSE 10 G/15ML
30 SOLUTION ORAL
Qty: 900 | Refills: 0
Start: 2018-08-03 | End: 2018-09-01

## 2018-08-03 RX ORDER — SPIRONOLACTONE 25 MG/1
2 TABLET, FILM COATED ORAL
Qty: 0 | Refills: 0 | COMMUNITY
Start: 2018-08-03

## 2018-08-03 RX ORDER — WARFARIN SODIUM 2.5 MG/1
1 TABLET ORAL
Qty: 0 | Refills: 0 | COMMUNITY
Start: 2018-08-03

## 2018-08-03 RX ORDER — METOPROLOL TARTRATE 50 MG
1 TABLET ORAL
Qty: 0 | Refills: 0 | COMMUNITY
Start: 2018-08-03

## 2018-08-03 RX ORDER — METOPROLOL TARTRATE 50 MG
1 TABLET ORAL
Qty: 60 | Refills: 0
Start: 2018-08-03 | End: 2018-09-01

## 2018-08-03 RX ORDER — WARFARIN SODIUM 2.5 MG/1
2 TABLET ORAL ONCE
Qty: 0 | Refills: 0 | Status: DISCONTINUED | OUTPATIENT
Start: 2018-08-03 | End: 2018-08-03

## 2018-08-03 RX ORDER — LACTULOSE 10 G/15ML
30 SOLUTION ORAL
Qty: 0 | Refills: 0 | COMMUNITY
Start: 2018-08-03

## 2018-08-03 RX ORDER — SPIRONOLACTONE 25 MG/1
2 TABLET, FILM COATED ORAL
Qty: 60 | Refills: 0
Start: 2018-08-03 | End: 2018-09-01

## 2018-08-03 RX ORDER — FUROSEMIDE 40 MG
1 TABLET ORAL
Qty: 30 | Refills: 0
Start: 2018-08-03 | End: 2018-09-01

## 2018-08-03 RX ADMIN — ENOXAPARIN SODIUM 90 MILLIGRAM(S): 100 INJECTION SUBCUTANEOUS at 05:18

## 2018-08-03 RX ADMIN — Medication 40 MILLIGRAM(S): at 05:19

## 2018-08-03 RX ADMIN — PIPERACILLIN AND TAZOBACTAM 25 GRAM(S): 4; .5 INJECTION, POWDER, LYOPHILIZED, FOR SOLUTION INTRAVENOUS at 05:22

## 2018-08-03 RX ADMIN — SPIRONOLACTONE 50 MILLIGRAM(S): 25 TABLET, FILM COATED ORAL at 05:19

## 2018-08-03 RX ADMIN — Medication 50 GRAM(S): at 09:17

## 2018-08-03 NOTE — PROGRESS NOTE ADULT - PROBLEM SELECTOR PROBLEM 1
Portal vein thrombosis
SVT (supraventricular tachycardia)
Acute cholecystitis
SVT (supraventricular tachycardia)
Portal vein thrombosis

## 2018-08-03 NOTE — PROGRESS NOTE ADULT - PROBLEM SELECTOR PLAN 3
s/p adenosine 6mg on 7/30, now in NSR, no further tele events  restarted metoprolol 25mg BID per EP recs, no ablation plan this admission  HST negative, TTE unremarkable

## 2018-08-03 NOTE — PROGRESS NOTE ADULT - PROBLEM SELECTOR PLAN 4
- Likely secondary to chronic hepatitis, MELD-Na 16  - Complicated by coagulopathy, hypoalbuminemia, thrombocytopenia  evidence of portal HTN on CT with splenomegaly  - Continue rifaximin, diuretics and lactulose

## 2018-08-03 NOTE — PROGRESS NOTE ADULT - PROBLEM SELECTOR PLAN 1
MRCP with intrahepatic portal vein thrombosis with cavernous transformation.   Needs systemic A/C per hepatology, currently on lovenox to coumadin bridge  INR therapeutic 8/3, dose coumadin 2mg today, outpt INR check  Prior EGD report obtained from [place] placed in chart: Grad 1 distal esophageal varices and gastric body varices. Report discussed with hepatology, Ok to c/w A/C  Instructed patient on side effects of coumadin including but not limited to bleeding, bruising, and interaction with other medications including Antibiotics. Discussed importance of frequent INR checks with PCP MRCP with intrahepatic portal vein thrombosis with cavernous transformation.   Needs systemic A/C per hepatology, currently on lovenox to coumadin bridge  INR therapeutic 8/3, dose coumadin 2mg today, outpt INR check Mon  Prior EGD report obtained from [place] placed in chart: Grad 1 distal esophageal varices and gastric body varices. Report discussed with hepatology, Ok to c/w A/C  Instructed patient on side effects of coumadin including but not limited to bleeding, bruising, and interaction with other medications including Antibiotics. Discussed importance of frequent INR checks with PCP  Health information sheet given to patient about coumadin in Chinese

## 2018-08-03 NOTE — PROGRESS NOTE ADULT - PROBLEM SELECTOR PLAN 2
- US abdomen and CT abdomen/pelvis with gallstones, distended gallbladder and ?dilated CBD to 9mm, LFTs stable  - MRCP with acute cholecystitis without choledocholithiasis, portal HTN and intrahepatic portal vein thrombosis with cavernous transformation  - On Zosyn day 5/5, outpatient surgery followup-no plan for inpatient CCY, Pain now improved and tolerating diet
- US abdomen and CT abdomen/pelvis with gallstones, distended gallbladder and ?dilated CBD to 9mm  - Pain control with Morphine PRN  - Trend WBC, liver enzymes, bilirubin, coags   - GI recs appreciated, MRCP pending, no surgical plan at present  - Continue Zosyn and IVF
- improved  US abdomen and CT abdomen/pelvis with gallstones, distended gallbladder and ?dilated CBD to 9mm, LFTs stable  - MRCP with acute cholecystitis without choledocholithiasis, portal HTN and intrahepatic portal vein thrombosis with cavernous transformation  - s/p 5 days Zosyn, outpatient surgery followup-no plan for inpatient CCY  Pain now improved and tolerating diet
s/p adenosine 6mg on 7/30, now in NSR, no further tele events  restarted metoprolol 25mg BID per EP recs, no ablation plan this admission  HST negative  TTE unremarkable
- US abdomen and CT abdomen/pelvis with gallstones, distended gallbladder and ?dilated CBD to 9mm, LFTs stable  - MRCP pending, no surgical plan at present, tolerating CLQ diet, GI following  - Continue Zosyn and IVF for now

## 2018-08-03 NOTE — PROGRESS NOTE ADULT - PROBLEM SELECTOR PROBLEM 2
Acute cholecystitis
R/O Choledocholithiasis
SVT (supraventricular tachycardia)
R/O Choledocholithiasis
Acute cholecystitis

## 2018-08-03 NOTE — PROGRESS NOTE ADULT - ATTENDING COMMENTS
Dispo: INR therapeutic. Stable for DC home. DC time: 35 min Dispo: INR therapeutic. Stable for DC home. DC time: 35 min    Patient was not able to call name or number of PCP. Will email [facility] clinic for INR check on Mon.

## 2018-08-03 NOTE — PROGRESS NOTE ADULT - SUBJECTIVE AND OBJECTIVE BOX
INCOMPLETE Patient is a 63y old  Female who presents with a chief complaint of ?Choledocholithiasis (30 Jul 2018 09:07)      SUBJECTIVE / OVERNIGHT EVENTS: No acute events overnight    MEDICATIONS  (STANDING):  enoxaparin Injectable 90 milliGRAM(s) SubCutaneous every 12 hours  furosemide    Tablet 40 milliGRAM(s) Oral daily  lactulose Syrup 20 Gram(s) Oral at bedtime  magnesium sulfate  IVPB 2 Gram(s) IV Intermittent once  metoprolol tartrate 25 milliGRAM(s) Oral two times a day  rifaximin 550 milliGRAM(s) Oral two times a day  spironolactone 50 milliGRAM(s) Oral daily  warfarin 2 milliGRAM(s) Oral once    MEDICATIONS  (PRN):      T(C): 36.7 (08-03-18 @ 05:13), Max: 36.7 (08-02-18 @ 21:08)  HR: 59 (08-03-18 @ 05:13) (59 - 64)  BP: 114/62 (08-03-18 @ 05:13) (91/53 - 114/62)  RR: 18 (08-03-18 @ 05:13) (16 - 18)  SpO2: 100% (08-03-18 @ 05:13) (100% - 100%)  CAPILLARY BLOOD GLUCOSE        I&O's Summary    PHYSICAL EXAM:  GENERAL: no apparent distress, on room air  EYES: sclera clear b/l  CHEST/LUNG: Clear to auscultation bilaterally; No wheezing or crackles  HEART: s1/s2, regular rate and rhythm, no murmurs appreciated  ABDOMEN: Soft, nontender, no guarding or grimacing, + bowel sounds  EXTREMITIES: No clubbing, cyanosis, dark hyperpigmentation of b/l lower extremities nontender to palpation with some swelling  NEUROLOGY: awake, alert, responds to Qs appropriately, no gross deficits    LABS:                        11.6   2.95  )-----------( 62       ( 03 Aug 2018 07:05 )             36.2     08-03    137  |  105  |  12  ----------------------------<  80  4.0   |  23  |  0.73    Ca    8.5      03 Aug 2018 07:05  Mg     1.4     08-03      PT/INR - ( 03 Aug 2018 07:05 )   PT: 25.6 SEC;   INR: 2.27          PTT - ( 03 Aug 2018 07:05 )  PTT:62.5 SEC          RADIOLOGY & ADDITIONAL TESTS:

## 2018-08-03 NOTE — PROGRESS NOTE ADULT - PROBLEM SELECTOR PLAN 9
- Chronic anemia in setting of cirrhosis, H/H stable  iron panel, b12, folate WNL - Chronic anemia in setting of cirrhosis, H/H stable  iron panel, b12, folate WNL  Patient advised to return to ED if notices black stools or bleeding

## 2018-08-03 NOTE — PROGRESS NOTE ADULT - PROVIDER SPECIALTY LIST ADULT
Cardiology
Electrophysiology
Electrophysiology
Gastroenterology
Gastroenterology
Hepatology
Hepatology
Hospitalist

## 2018-08-03 NOTE — PROGRESS NOTE ADULT - ASSESSMENT
63F with h/o HCV, liver cirrhosis who presents with severe epigastric/RUQ abdominal pain, associated with nausea. Transferred from OSH for MRCP/ERCP to r/o choledocholithiasis. US/CT revealed distended GB with gallstones and dilated CBD to 9 mm with lipase 6000s but no CT evidence of pancreatitis. MRCP confirmed acute cholecystitis without choledocholithiasis and finding of intrahepatic vein thrombosis. Course c/b SVT to 160s on 7/30 s/p adenosine 6mg, now in NSR.

## 2018-08-06 ENCOUNTER — APPOINTMENT (OUTPATIENT)
Dept: INTERNAL MEDICINE | Facility: HOSPITAL | Age: 63
End: 2018-08-06

## 2018-09-06 ENCOUNTER — APPOINTMENT (OUTPATIENT)
Dept: ELECTROPHYSIOLOGY | Facility: CLINIC | Age: 63
End: 2018-09-06

## 2019-04-18 NOTE — H&P ADULT - EXTREMITIES COMMENTS
They did not obviously read my note RLE pitting edema with cellulitic changes, tense, moist skin, and erythema RLE pitting edema with cellulitic changes, tense, moist skin, and erythema, erythema is extending to the lateral side of the right thigh

## 2019-07-10 ENCOUNTER — INPATIENT (INPATIENT)
Facility: HOSPITAL | Age: 64
LOS: 1 days | Discharge: HOME | DRG: 203 | End: 2019-07-12
Attending: STUDENT IN AN ORGANIZED HEALTH CARE EDUCATION/TRAINING PROGRAM | Admitting: STUDENT IN AN ORGANIZED HEALTH CARE EDUCATION/TRAINING PROGRAM
Payer: MEDICAID

## 2019-07-10 VITALS
HEIGHT: 56 IN | HEART RATE: 92 BPM | WEIGHT: 160.06 LBS | SYSTOLIC BLOOD PRESSURE: 107 MMHG | TEMPERATURE: 99 F | OXYGEN SATURATION: 97 % | RESPIRATION RATE: 19 BRPM | DIASTOLIC BLOOD PRESSURE: 71 MMHG

## 2019-07-10 DIAGNOSIS — R06.02 SHORTNESS OF BREATH: ICD-10-CM

## 2019-07-10 DIAGNOSIS — Z29.9 ENCOUNTER FOR PROPHYLACTIC MEASURES, UNSPECIFIED: ICD-10-CM

## 2019-07-10 DIAGNOSIS — K74.60 UNSPECIFIED CIRRHOSIS OF LIVER: ICD-10-CM

## 2019-07-10 DIAGNOSIS — R60.0 LOCALIZED EDEMA: ICD-10-CM

## 2019-07-10 LAB
24R-OH-CALCIDIOL SERPL-MCNC: 35 NG/ML — SIGNIFICANT CHANGE UP (ref 30–80)
ALBUMIN SERPL ELPH-MCNC: 2.2 G/DL — LOW (ref 3.5–5)
ALP SERPL-CCNC: 109 U/L — SIGNIFICANT CHANGE UP (ref 40–120)
ALT FLD-CCNC: 29 U/L DA — SIGNIFICANT CHANGE UP (ref 10–60)
ANION GAP SERPL CALC-SCNC: 8 MMOL/L — SIGNIFICANT CHANGE UP (ref 5–17)
APTT BLD: 44.7 SEC — HIGH (ref 27.5–36.3)
AST SERPL-CCNC: 33 U/L — SIGNIFICANT CHANGE UP (ref 10–40)
BASOPHILS # BLD AUTO: 0.02 K/UL — SIGNIFICANT CHANGE UP (ref 0–0.2)
BASOPHILS NFR BLD AUTO: 0.3 % — SIGNIFICANT CHANGE UP (ref 0–2)
BILIRUB DIRECT SERPL-MCNC: 1 MG/DL — HIGH (ref 0–0.2)
BILIRUB SERPL-MCNC: 2.6 MG/DL — HIGH (ref 0.2–1.2)
BUN SERPL-MCNC: 16 MG/DL — SIGNIFICANT CHANGE UP (ref 7–18)
CALCIUM SERPL-MCNC: 8.2 MG/DL — LOW (ref 8.4–10.5)
CHLORIDE SERPL-SCNC: 106 MMOL/L — SIGNIFICANT CHANGE UP (ref 96–108)
CHOLEST SERPL-MCNC: 81 MG/DL — SIGNIFICANT CHANGE UP (ref 10–199)
CO2 SERPL-SCNC: 23 MMOL/L — SIGNIFICANT CHANGE UP (ref 22–31)
CREAT SERPL-MCNC: 0.75 MG/DL — SIGNIFICANT CHANGE UP (ref 0.5–1.3)
EOSINOPHIL # BLD AUTO: 0.03 K/UL — SIGNIFICANT CHANGE UP (ref 0–0.5)
EOSINOPHIL NFR BLD AUTO: 0.4 % — SIGNIFICANT CHANGE UP (ref 0–6)
FLU A RESULT: SIGNIFICANT CHANGE UP
FLU A RESULT: SIGNIFICANT CHANGE UP
FLUAV AG NPH QL: SIGNIFICANT CHANGE UP
FLUBV AG NPH QL: SIGNIFICANT CHANGE UP
FOLATE SERPL-MCNC: 13.4 NG/ML — SIGNIFICANT CHANGE UP
GLUCOSE SERPL-MCNC: 98 MG/DL — SIGNIFICANT CHANGE UP (ref 70–99)
HBA1C BLD-MCNC: 4.7 % — SIGNIFICANT CHANGE UP (ref 4–5.6)
HCT VFR BLD CALC: 39.5 % — SIGNIFICANT CHANGE UP (ref 34.5–45)
HDLC SERPL-MCNC: 42 MG/DL — LOW
HGB BLD-MCNC: 12.9 G/DL — SIGNIFICANT CHANGE UP (ref 11.5–15.5)
IMM GRANULOCYTES NFR BLD AUTO: 0.3 % — SIGNIFICANT CHANGE UP (ref 0–1.5)
INR BLD: 1.68 RATIO — HIGH (ref 0.88–1.16)
LIPID PNL WITH DIRECT LDL SERPL: 29 MG/DL — SIGNIFICANT CHANGE UP
LYMPHOCYTES # BLD AUTO: 1.03 K/UL — SIGNIFICANT CHANGE UP (ref 1–3.3)
LYMPHOCYTES # BLD AUTO: 14.1 % — SIGNIFICANT CHANGE UP (ref 13–44)
MCHC RBC-ENTMCNC: 32 PG — SIGNIFICANT CHANGE UP (ref 27–34)
MCHC RBC-ENTMCNC: 32.7 GM/DL — SIGNIFICANT CHANGE UP (ref 32–36)
MCV RBC AUTO: 98 FL — SIGNIFICANT CHANGE UP (ref 80–100)
MONOCYTES # BLD AUTO: 0.6 K/UL — SIGNIFICANT CHANGE UP (ref 0–0.9)
MONOCYTES NFR BLD AUTO: 8.2 % — SIGNIFICANT CHANGE UP (ref 2–14)
NEUTROPHILS # BLD AUTO: 5.58 K/UL — SIGNIFICANT CHANGE UP (ref 1.8–7.4)
NEUTROPHILS NFR BLD AUTO: 76.7 % — SIGNIFICANT CHANGE UP (ref 43–77)
NRBC # BLD: 0 /100 WBCS — SIGNIFICANT CHANGE UP (ref 0–0)
NT-PROBNP SERPL-SCNC: 74 PG/ML — SIGNIFICANT CHANGE UP (ref 0–125)
PLATELET # BLD AUTO: 53 K/UL — LOW (ref 150–400)
POTASSIUM SERPL-MCNC: 4 MMOL/L — SIGNIFICANT CHANGE UP (ref 3.5–5.3)
POTASSIUM SERPL-SCNC: 4 MMOL/L — SIGNIFICANT CHANGE UP (ref 3.5–5.3)
PROCALCITONIN SERPL-MCNC: 0.35 NG/ML — HIGH (ref 0.02–0.1)
PROT SERPL-MCNC: 7.6 G/DL — SIGNIFICANT CHANGE UP (ref 6–8.3)
PROTHROM AB SERPL-ACNC: 19 SEC — HIGH (ref 10–12.9)
RBC # BLD: 4.03 M/UL — SIGNIFICANT CHANGE UP (ref 3.8–5.2)
RBC # FLD: 15.2 % — HIGH (ref 10.3–14.5)
RSV RESULT: DETECTED
RSV RNA RESP QL NAA+PROBE: DETECTED
SODIUM SERPL-SCNC: 137 MMOL/L — SIGNIFICANT CHANGE UP (ref 135–145)
TOTAL CHOLESTEROL/HDL RATIO MEASUREMENT: 1.9 RATIO — LOW (ref 3.3–7.1)
TRIGL SERPL-MCNC: 50 MG/DL — SIGNIFICANT CHANGE UP (ref 10–149)
TROPONIN I SERPL-MCNC: <0.015 NG/ML — SIGNIFICANT CHANGE UP (ref 0–0.04)
TSH SERPL-MCNC: 2.27 UU/ML — SIGNIFICANT CHANGE UP (ref 0.34–4.82)
VIT B12 SERPL-MCNC: 1320 PG/ML — HIGH (ref 232–1245)
WBC # BLD: 7.28 K/UL — SIGNIFICANT CHANGE UP (ref 3.8–10.5)
WBC # FLD AUTO: 7.28 K/UL — SIGNIFICANT CHANGE UP (ref 3.8–10.5)

## 2019-07-10 PROCEDURE — 99285 EMERGENCY DEPT VISIT HI MDM: CPT

## 2019-07-10 PROCEDURE — 71046 X-RAY EXAM CHEST 2 VIEWS: CPT | Mod: 26

## 2019-07-10 PROCEDURE — 99223 1ST HOSP IP/OBS HIGH 75: CPT

## 2019-07-10 PROCEDURE — 93970 EXTREMITY STUDY: CPT | Mod: 26

## 2019-07-10 RX ORDER — SPIRONOLACTONE 25 MG/1
50 TABLET, FILM COATED ORAL DAILY
Refills: 0 | Status: DISCONTINUED | OUTPATIENT
Start: 2019-07-11 | End: 2019-07-12

## 2019-07-10 RX ORDER — AZITHROMYCIN 500 MG/1
500 TABLET, FILM COATED ORAL ONCE
Refills: 0 | Status: COMPLETED | OUTPATIENT
Start: 2019-07-10 | End: 2019-07-10

## 2019-07-10 RX ORDER — KETOROLAC TROMETHAMINE 30 MG/ML
30 SYRINGE (ML) INJECTION ONCE
Refills: 0 | Status: DISCONTINUED | OUTPATIENT
Start: 2019-07-10 | End: 2019-07-10

## 2019-07-10 RX ORDER — FUROSEMIDE 40 MG
40 TABLET ORAL ONCE
Refills: 0 | Status: COMPLETED | OUTPATIENT
Start: 2019-07-10 | End: 2019-07-10

## 2019-07-10 RX ORDER — CEFTRIAXONE 500 MG/1
1000 INJECTION, POWDER, FOR SOLUTION INTRAMUSCULAR; INTRAVENOUS ONCE
Refills: 0 | Status: COMPLETED | OUTPATIENT
Start: 2019-07-10 | End: 2019-07-10

## 2019-07-10 RX ORDER — FUROSEMIDE 40 MG
40 TABLET ORAL DAILY
Refills: 0 | Status: DISCONTINUED | OUTPATIENT
Start: 2019-07-11 | End: 2019-07-11

## 2019-07-10 RX ORDER — SPIRONOLACTONE 25 MG/1
50 TABLET, FILM COATED ORAL ONCE
Refills: 0 | Status: COMPLETED | OUTPATIENT
Start: 2019-07-10 | End: 2019-07-10

## 2019-07-10 RX ADMIN — Medication 40 MILLIGRAM(S): at 07:51

## 2019-07-10 RX ADMIN — Medication 30 MILLIGRAM(S): at 06:03

## 2019-07-10 RX ADMIN — CEFTRIAXONE 1000 MILLIGRAM(S): 500 INJECTION, POWDER, FOR SOLUTION INTRAMUSCULAR; INTRAVENOUS at 06:04

## 2019-07-10 RX ADMIN — AZITHROMYCIN 250 MILLIGRAM(S): 500 TABLET, FILM COATED ORAL at 04:35

## 2019-07-10 RX ADMIN — Medication 200 MILLIGRAM(S): at 14:22

## 2019-07-10 RX ADMIN — SPIRONOLACTONE 50 MILLIGRAM(S): 25 TABLET, FILM COATED ORAL at 09:25

## 2019-07-10 RX ADMIN — Medication 100 MILLIGRAM(S): at 03:59

## 2019-07-10 RX ADMIN — AZITHROMYCIN 500 MILLIGRAM(S): 500 TABLET, FILM COATED ORAL at 06:04

## 2019-07-10 RX ADMIN — Medication 30 MILLIGRAM(S): at 04:07

## 2019-07-10 RX ADMIN — Medication 200 MILLIGRAM(S): at 22:00

## 2019-07-10 RX ADMIN — CEFTRIAXONE 100 MILLIGRAM(S): 500 INJECTION, POWDER, FOR SOLUTION INTRAMUSCULAR; INTRAVENOUS at 04:35

## 2019-07-10 NOTE — H&P ADULT - ADDITIONAL PE
Patient is a well groomed obese female, in distress due to cough, tends to sit up Patient is a well groomed obese female, in distress due to cough, tends to change position frequently

## 2019-07-10 NOTE — H&P ADULT - PROBLEM SELECTOR PLAN 1
Patient presents with shortness of breath, cough, pedal edema  EKG NSR, no ST-TW changes  Acute heart failure or likely decompensation of liver cirrhosis with fluid overload  -start lasix 40mg iv push daily, continue aldactone  -robitussin prn cough  -strict I/Os  -f/u BNP, echo  -would consider U/S of abdomen  -monitor BMP  Cardio Dr Burgos consulted

## 2019-07-10 NOTE — PATIENT PROFILE ADULT - FUNCTIONAL SCREEN CURRENT LEVEL: BATHING, MLM
"                            Occupational Therapy Daily Treatment Note     Date: 11/29/2018  Name: Aracelis Martinez  Clinic Number: [id number]    Therapy Diagnosis:   Encounter Diagnoses   Name Primary?    Effusion of right hand     Finger stiffness, right     Range of motion deficit      Physician: Sowmya Schmidt, *  Physician Orders: Eval and treat, modalities PRN   Date of Return to MD: 12/3/2018     Evaluation Date: 11/9/2018  Plan of Care Certification Period: 11/9/2018-1/9/19     Visit #: 3  Visits authorized: 40 PCY  Insurance Authorization period Expiration: 12/31/2018     Time In: 11am  Time Out: 12pm  Total Billable Time: 45 minutes    Precautions: Standard    Subjective     Pt reports/Reposnse to previous tx: "It hurt some when I left but I have much more motion"  She was compliant with home exercise program given last session.     Pain: 4/10  Location: right index and middle fingers     Objective       Aracelis received the following supervised modalities after being cleared for contradictions for 10 minutes:   -Patient received paraffin bath to R hand(s) for 10 minutes to increase blood flow, circulation, pain management and for tissue elasticity prior to therex.     Aracelis received the following manual therapy techniques for 25 minutes:   -PROM with grade I joint mobilizations for isolated MP and IP flexion followed by composite flexion of index and middle fingers  -Place and hold for flat fisting     Aracelis received therapeutic exercises for 25 minutes including:  -IP joint blocking 2 x 15 reps  -Reverse blocking 2 x 15 reps  -Towel scrunches (raking) 2 x 15 reps  -Desensitization with towel x 5 min to dorsal DIPs  -While in fluidotherapy   -TGE positions A-E, isolated x 2 min each     Home Exercises and Education Provided     Education provided:   - Progress towards goals     Written Home Exercises Provided: Continue previous HEP  Exercises were reviewed and Aracelis was able to demonstrate them prior to the " end of the session.  Aracelis demonstrated good  understanding of the HEP provided.   .   See EMR under Patient Instructions for exercises provided on initial evaluation. .       Assessment     Pt would continue to benefit from skilled OT. Tolerated added fluidotherapy for desensitization well with no adverse effects and with improved ROM following modality and manual today.  Aracelis is progressing well towards her goals and there are no updates to goals at this time. Pt prognosis is Good. Pt will continue to benefit from skilled outpatient occupational therapy to address the deficits listed in the problem list on initial evalution provide pt/family education and to maximize pt's level of independence in the home and community environment.     Anticipated barriers to occupational therapy: None  Pt's spiritual, cultural and educational needs considered and pt agreeable to plan of care and goals.    Goals:  Long Term Goals (LTGs); to be met by discharge.  LTG #1: Pt will report a pain level of 1 out of 10 with household chores        LTG #2: Pt will demo improved FOTO score by 20 points.   LTG #3: Pt will return to prior level of function for ADLs and household management.      Short Term Goals (STGs); to be met within 4 weeks (12/9/2018).  STG #1a: Pt will report 3 out of 10 pain level with dressing tasks.  STG #2a: Pt will report/demo only minimal difficulty with light laundry tasks.  STG #2b: Pt will report/demo independence with opening/closing doors.  STG #3a: Pt will demonstrate independence with issued HEP.   STG #3b: Pt will demo improved right IF and LF SANCHES by 50 degrees each needed to aid with manipulating items in dominant hand.    Plan     Updates/Grading for next session: [place] wrapped fist into paraffin     Anat Duncan, OT    0 = independent

## 2019-07-10 NOTE — H&P ADULT - ASSESSMENT
64F with PMH of liver cirrhosis (presumed due to chronic hepatitis), hepatitis c (RNA negative), cholecystitis, portal vein thrombosis, SVT, venous stasis, PSH of hysterectomy presented to ER with cough, chest pain and difficulty breathing since 3 days.   In ER, her vitals were temp 98.2, HR 77/min, /70mm Hg, RR 18/min, SaO2 98% on RA. Labs noted with plt count 53, INR 1.68, alb 2.2, nel 2.6, troponin1 -ve, CXR wnl.

## 2019-07-10 NOTE — CONSULT NOTE ADULT - ATTENDING COMMENTS
63 yo F presenting with ascites and fluid overload likely in setting of liver cirrhosis. Patient's dyspnea is likely due to (+)RSV, her pro-BNP is normal essentially ruling out HF as possible etiology.   -Echocardiogram pending  -Consider GI evaluation for cirrhosis, may need to titrate spironolactone/furosemide

## 2019-07-10 NOTE — CONSULT NOTE ADULT - ASSESSMENT
63 yo F with chronic liver cirrhosis came with c/o of SOB. Given her BNP, EKG were normal and the history and physical support that her SOB is more associated with distended belly 2/2 to ascites, making it physically hard for her to breathe. Echo should be done to see the burden on right heart and GI should be consulted to further examine the pt. 63 yo F with chronic liver cirrhosis came with c/o of SOB. Given her BNP, EKG and troponin were normal and the history and physical support that her SOB is more associated with distended belly 2/2 to ascites, making it physically hard for her to breathe. Echo should be done to see the burden on right heart and GI should be consulted to further examine the pt. 65 yo F with chronic liver cirrhosis came with c/o of SOB. Given her BNP, EKG and troponin were normal and the history and physical support that her dysponea is more associated with distended belly 2/2 to ascites, making it physically hard for her to breathe. Echo should be done to see the burden on right heart and GI should be consulted to further examine the pt.

## 2019-07-10 NOTE — H&P ADULT - PROBLEM SELECTOR PLAN 4
IMPROVE VTE Individual Risk Assessment  RISK                                                                Points  [  ] Previous VTE                                                  3  [  ] Thrombophilia                                               2  [  ] Lower limb paralysis                                      2       (unable to hold up >15 seconds)    [  ] Current Cancer                                              2        (within 6 months)  [ x ] Immobilization > 24 hrs                                1  [  ] ICU/CCU stay > 24 hours                              1  [ x ] Age > 60                                                      1  IMPROVE VTE Score 2, however plt count 53, will hold for now given patietn has liver cirrhosis, elevated INR

## 2019-07-10 NOTE — ED PROVIDER NOTE - OBJECTIVE STATEMENT
65 y/o female with PMHx of cirrhosis presents to the ED c/o chest pain x 3 days. Pt notes associated productive cough with sputum. Pt denies nausea, vomiting, shortness of breath, fever, abd pain, or any other complaints. NKDA.

## 2019-07-10 NOTE — H&P ADULT - PROBLEM SELECTOR PLAN 3
Patient with cirrhosis due to chronic hepatitis, Hx of variceal bleeds, however not on beta blocker  Does not follow up with hepatologist  MELD score 18 with estimated 3-4% 90-day mortality  Child-[place] class B, indication for transplant evaluation  Continue lasix and aldactone   f/u CMP, direct bilirubin

## 2019-07-10 NOTE — ED ADULT NURSE NOTE - NSIMPLEMENTINTERV_GEN_ALL_ED
Implemented All Universal Safety Interventions:  [place] to call system. Call bell, personal items and telephone within reach. Instruct patient to call for assistance. Room bathroom lighting operational. Non-slip footwear when patient is off stretcher. Physically safe environment: no spills, clutter or unnecessary equipment. Stretcher in lowest position, wheels locked, appropriate side rails in place.

## 2019-07-10 NOTE — H&P ADULT - HISTORY OF PRESENT ILLNESS
64F with PMH of liver cirrhosis (presumed due to chronic hepatitis), hepatitis c (RNA negative), cholecystitis, portal vein thrombosis, SVT, venous stasis, PSH of hysterectomy presented to ER with cough, chest pain and difficulty breathing since 3 days. Patient when assessed bedside, was in distress from cough, gave only a limited history saying she "already told everything to the other doctor". Case discussed with Dr Brooks as well as information obtained from previous charts: patient had acute onset left sided chest pain with cough productive of 64F with PMH of liver cirrhosis (presumed due to chronic hepatitis), hepatitis c (RNA negative), cholecystitis, portal vein thrombosis, SVT, venous stasis, PSH of hysterectomy presented to ER with cough, chest pain and difficulty breathing since 3 days. Patient when assessed bedside, was in distress from cough, gave only a limited history saying she "already told everything to the other doctor".  Patient came to the hospital with acute onset left sided chest pain with cough productive of yellowish sputum since 3 days. As per patient she tends to get short of breath as a result of bouts of coughing, is noted to have difficulty completing sentences. She denies fever, chills or lightheadedness but does endorse swelling of lower extremities. Patient refuses to give further history as she is uncomfortable from the cough  Case discussed with Dr Brooks as well as information obtained from previous charts. Patient was diagnosed with cirrhosis 5 years ago, was found to be positive for hep C but RNA -ve, She had an admission to [facility] for esophageal variceal bleed. Patient foes not have a PCP nor does she follow up with a hepatologist. Most of her medical care is from ED visits to [place]. Patient currently presents with 3 day h/o chest pain , productive cough and shortness of breath, worse with mild exertion abd laying flat. She does have episodes of dyspnea at night. Patient reports her only medications being lasix 40mg and spironolactone 50mg daily.   Charts from her admission to [facility] in July 2018 reviewed, patient was admitted for cholecystitis, found to have portal venous thrombosis on MRCP, was started on coumadin and given number for hepatology clinic. She also had episode of SVT for she was recommended metoprolol on discharge and advised to f/u with cardiac EP.

## 2019-07-10 NOTE — CONSULT NOTE ADULT - SUBJECTIVE AND OBJECTIVE BOX
CHIEF COMPLAINT: SOB     HPI: 64F with liver cirrhosis (presumed due to chronic hepatitis), hepatitis c (RNA negative), cholecystitis, portal vein thrombosis, SVT, venous stasis, PSH of hysterectomy presented to ER with cough, chest pain and difficulty breathing since 3 days. Patient when assessed bedside, was in distress from cough, gave only a limited history saying she "already told everything to the other doctor".  Patient came to the hospital with acute onset left sided chest pain with cough productive of yellowish sputum since 3 days. As per patient she tends to get short of breath as a result of bouts of coughing, is noted to have difficulty completing sentences. She denies fever, chills or lightheadedness but does endorse swelling of lower extremities. Patient refuses to give further history as she is uncomfortable from the cough  Case discussed with Dr Brooks as well as information obtained from previous charts. Patient was diagnosed with cirrhosis 5 years ago, was found to be positive for hep C but RNA -ve, She had an admission to [facility] for esophageal variceal bleed. Patient foes not have a PCP nor does she follow up with a hepatologist. Most of her medical care is from ED visits to [facility]. Patient currently presents with 3 day h/o chest pain , productive cough and shortness of breath, worse with mild exertion abd laying flat. She does have episodes of dyspnea at night. Patient reports her only medications being lasix 40mg and spironolactone 50mg daily.   Charts from her admission to [facility] in July 2018 reviewed, patient was admitted for cholecystitis, found to have portal venous thrombosis on MRCP, was started on coumadin and given number for hepatology clinic. She also had episode of SVT for she was recommended metoprolol on discharge and advised to f/u with cardiac EP.     PAST MEDICAL & SURGICAL HISTORY:  H/O cholecystitis  Edema of both legs: venous stasis  Cirrhosis  Hepatitis C  S/P hysterectomy      Allergies    No Known Allergies    Intolerances        MEDICATIONS  (STANDING):    MEDICATIONS  (PRN):  guaiFENesin    Syrup 200 milliGRAM(s) Oral every 6 hours PRN Cough      FAMILY HISTORY:    No family history of premature coronary artery disease or sudden cardiac death    SOCIAL HISTORY:  Smoking- Pt refused giving any more history   Alcohol-  Illicit Drug use-    REVIEW OF SYSTEMS:  Constitutional: [no ] fever,  [ yes]fatigue  Eyes: [ no] visual changes  Respiratory: [yes ]shortness of breath;  [yes ] cough, [ no]wheezing, [ no]chills, [no ]hemoptysis  Cardiovascular: [yes ] chest pain, [no ]palpitations, [no ]dizziness,  [yes]leg swelling [no ]syncope  Gastrointestinal: [np ] abdominal pain, [no ]nausea, [no ]vomiting,  [no ]diarrhea   Genitourinary: [ ] dysuria, [ ] hematuria  Neurologic: [ ] headaches [ ] tremors  [ ] weakness [ ] lightheadedness  Skin: [ ] itching, [ ]burning, [ ] rashes  Endocrine: [ ] heat or cold intolerance  Musculoskeletal: [ ] joint pain or swelling; [ ] muscle, back, or extremity pain  Psychiatric: [ ] depression, [ ]anxiety, [ ]mood swings, or [ ]difficulty sleeping  Hematologic: [ ] easy bruising, [ ] bleeding gums       [ x] All others negative	  [ ] Unable to obtain    Vital Signs Last 24 Hrs  T(C): 36.7 (10 Jul 2019 11:27), Max: 37.1 (10 Jul 2019 02:21)  T(F): 98 (10 Jul 2019 11:27), Max: 98.7 (10 Jul 2019 02:21)  HR: 79 (10 Jul 2019 11:27) (77 - 92)  BP: 97/64 (10 Jul 2019 11:27) (97/64 - 116/70)  BP(mean): --  RR: 18 (10 Jul 2019 11:27) (18 - 19)  SpO2: 99% (10 Jul 2019 11:27) (97% - 99%)  I&O's Summary      PHYSICAL EXAM:  General: sitting upright, coughing constantly, unable to have a conversation, falling asleep while talking  HEENT: PERRL  Neck: Supple  Lungs: bilateral coarse breath sounds. pt coughing constantly- hard to asses   Heart: Regular rate and rhythm;   Abdomen: Nontender, distended, bowel sounds present  Extremities: bilateral lower extremity edema,   Nervous system:  Alert & Oriented X3, no focal deficits  Psychiatric: Normal affect  Skin: right lower leg has skin tightening and discoloration      LABS:  07-10    137  |  106  |  16  ----------------------------<  98  4.0   |  23  |  0.75    Ca    8.2<L>      10 Jul 2019 04:00    TPro  x   /  Alb  x   /  TBili  x   /  DBili  1.0<H>  /  AST  x   /  ALT  x   /  AlkPhos  x   07-10    Creatinine Trend: 0.75<--                        12.9   7.28  )-----------( 53       ( 10 Jul 2019 04:00 )             39.5     PT/INR - ( 10 Jul 2019 04:00 )   PT: 19.0 sec;   INR: 1.68 ratio         PTT - ( 10 Jul 2019 04:00 )  PTT:44.7 sec    Lipid Panel: Cholesterol, Serum 81  Direct LDL 29  HDL Cholesterol, Serum 42  Triglycerides, Serum 50    Cardiac Enzymes: CARDIAC MARKERS ( 10 Jul 2019 04:00 )  <0.015 ng/mL / x     / x     / x     / x          Serum Pro-Brain Natriuretic Peptide: 74 pg/mL (07-10-19 @ 11:18)        RADIOLOGY:  < from: Xray Chest 2 Views PA/Lat (07.10.19 @ 03:18) >  INTERPRETATION:  PA and lateral chest on July 10, 2019 at 3:05 AM.   Patient has cough and chest pain.    Heart size is within normal limits.    The lung fields and pleural surfaces are unremarkable.    Chest is similar to February 20, 2018.    IMPRESSION: Negative chest.                SHARYN PETER M.D., ATTENDING RADIOLOGIST    < end of copied text >    ECG [my interpretation]:     ECHO:

## 2019-07-10 NOTE — H&P ADULT - ATTENDING COMMENTS
64 year old woman with advanced liver failure /cirrhosis suspected to be alcohol related on lasix and aldactone ( ? compliance) with no fixed PCP or hepatologist but does visit [facility] for most of her ED visits.   She comes in with 3 days of SOB, chest pain, cough productive of seropurulent sputum, worsening exercise intolerance and GATES. There is no fever, no chills, no rigors but has PND and orthopnea. There is some leg and abdominal swelling but not out of the ordinary. It is not possible to piece her medical hx together because of her poor compliance  or understanding with follow up.  She was diagnosed 5 years ago when she had esophageal variceal bleed in [place].    ROS - not contributory    Vital Signs Last 24 Hrs  T(C): 37.1 (10 Jul 2019 02:21), Max: 37.1 (10 Jul 2019 02:21)  T(F): 98.7 (10 Jul 2019 02:21), Max: 98.7 (10 Jul 2019 02:21)  HR: 92 (10 Jul 2019 02:21) (92 - 92)  BP: 107/71 (10 Jul 2019 02:21) (107/71 - 107/71)  RR: 19 (10 Jul 2019 02:21) (19 - 19)  SpO2: 97% (10 Jul 2019 02:21) (97% - 97%)    Icteric ++, not pale  Middle aged obese woman sitting up in bed; unable to speak in full sentences  No neck swelling, no JVD  CTA B/L no crackles  RRR S1S2 only  full, obese, soft, NT ND BS +  trace pedal edema  No focal deficits    labs                        12.9   7.28  )-----------( 53       ( 10 Jul 2019 04:00 )             39.5     07-10    137  |  106  |  16  ----------------------<  98  4.0   |  23  |  0.75    Ca    8.2<L>      10 Jul 2019 04:00    TPro  7.6  /  Alb  2.2<L>  /  TBili  2.6<H>  /  DBili  x   /  AST  33  /  ALT  29  /  AlkPhos  109  07-10    PT/INR - ( 10 Jul 2019 04:00 )   PT: 19.0 sec;   INR: 1.68 ratio    PTT - ( 10 Jul 2019 04:00 )  PTT:44.7 sec    CARDIAC MARKERS ( 10 Jul 2019 04:00 )  <0.015 ng/mL / x     / x     / x     / x        Impression  Suggestion of clinical presentation of acute heart failure which may or may not be related to underlying advanced liver failure  No evidence of a pneumonia    A/P  Admit to medicine  Diuresis with IV Lasix and aldactone  Obtain ECHO; pro-BNP  Supplemental O2  Cardiology consult  Pt with no follow up for her chronic liver disease; may benefit from a consult with hepatology with aim of outpatient follow up.

## 2019-07-10 NOTE — CONSULT NOTE ADULT - PROBLEM SELECTOR RECOMMENDATION 9
- not likely cardiac etiology  - BNP and EKG WNL  - TTE should be done to see the burden on heart   - ascites could be pushing diaphragm and making her SOB  - elevated INR also suggest liver dysfx  - GI should be consulted for further recommendation - not likely cardiac etiology  - BNP, troponin and EKG WNL  - TTE should be done to see the burden on heart   - ascites could be pushing diaphragm and making her SOB  - elevated INR also suggest liver dysfx  - GI should be consulted for further recommendation

## 2019-07-10 NOTE — H&P ADULT - NSICDXPASTMEDICALHX_GEN_ALL_CORE_FT
PAST MEDICAL HISTORY:  Cirrhosis     Edema of both legs venous stasis    H/O cholecystitis     Hepatitis C

## 2019-07-11 DIAGNOSIS — J06.9 ACUTE UPPER RESPIRATORY INFECTION, UNSPECIFIED: ICD-10-CM

## 2019-07-11 LAB
ALBUMIN SERPL ELPH-MCNC: 2.2 G/DL — LOW (ref 3.5–5)
ALP SERPL-CCNC: 112 U/L — SIGNIFICANT CHANGE UP (ref 40–120)
ALT FLD-CCNC: 30 U/L DA — SIGNIFICANT CHANGE UP (ref 10–60)
ANION GAP SERPL CALC-SCNC: 4 MMOL/L — LOW (ref 5–17)
AST SERPL-CCNC: 34 U/L — SIGNIFICANT CHANGE UP (ref 10–40)
BILIRUB SERPL-MCNC: 2.3 MG/DL — HIGH (ref 0.2–1.2)
BUN SERPL-MCNC: 18 MG/DL — SIGNIFICANT CHANGE UP (ref 7–18)
CALCIUM SERPL-MCNC: 8.5 MG/DL — SIGNIFICANT CHANGE UP (ref 8.4–10.5)
CHLORIDE SERPL-SCNC: 104 MMOL/L — SIGNIFICANT CHANGE UP (ref 96–108)
CO2 SERPL-SCNC: 28 MMOL/L — SIGNIFICANT CHANGE UP (ref 22–31)
CREAT SERPL-MCNC: 0.76 MG/DL — SIGNIFICANT CHANGE UP (ref 0.5–1.3)
GLUCOSE SERPL-MCNC: 80 MG/DL — SIGNIFICANT CHANGE UP (ref 70–99)
HCT VFR BLD CALC: 40.2 % — SIGNIFICANT CHANGE UP (ref 34.5–45)
HCV AB S/CO SERPL IA: 9.09 S/CO — HIGH (ref 0–0.99)
HCV AB SERPL-IMP: REACTIVE
HGB BLD-MCNC: 13.1 G/DL — SIGNIFICANT CHANGE UP (ref 11.5–15.5)
MCHC RBC-ENTMCNC: 32.1 PG — SIGNIFICANT CHANGE UP (ref 27–34)
MCHC RBC-ENTMCNC: 32.6 GM/DL — SIGNIFICANT CHANGE UP (ref 32–36)
MCV RBC AUTO: 98.5 FL — SIGNIFICANT CHANGE UP (ref 80–100)
NRBC # BLD: 0 /100 WBCS — SIGNIFICANT CHANGE UP (ref 0–0)
PLATELET # BLD AUTO: 66 K/UL — LOW (ref 150–400)
POTASSIUM SERPL-MCNC: 4.3 MMOL/L — SIGNIFICANT CHANGE UP (ref 3.5–5.3)
POTASSIUM SERPL-SCNC: 4.3 MMOL/L — SIGNIFICANT CHANGE UP (ref 3.5–5.3)
PROT SERPL-MCNC: 7.7 G/DL — SIGNIFICANT CHANGE UP (ref 6–8.3)
RBC # BLD: 4.08 M/UL — SIGNIFICANT CHANGE UP (ref 3.8–5.2)
RBC # FLD: 15.2 % — HIGH (ref 10.3–14.5)
SODIUM SERPL-SCNC: 136 MMOL/L — SIGNIFICANT CHANGE UP (ref 135–145)
WBC # BLD: 5.99 K/UL — SIGNIFICANT CHANGE UP (ref 3.8–10.5)
WBC # FLD AUTO: 5.99 K/UL — SIGNIFICANT CHANGE UP (ref 3.8–10.5)

## 2019-07-11 PROCEDURE — 99232 SBSQ HOSP IP/OBS MODERATE 35: CPT | Mod: GC

## 2019-07-11 PROCEDURE — 93306 TTE W/DOPPLER COMPLETE: CPT | Mod: 26

## 2019-07-11 PROCEDURE — 99223 1ST HOSP IP/OBS HIGH 75: CPT

## 2019-07-11 PROCEDURE — 76700 US EXAM ABDOM COMPLETE: CPT | Mod: 26

## 2019-07-11 RX ORDER — FUROSEMIDE 40 MG
40 TABLET ORAL DAILY
Refills: 0 | Status: DISCONTINUED | OUTPATIENT
Start: 2019-07-11 | End: 2019-07-12

## 2019-07-11 RX ADMIN — Medication 1200 MILLIGRAM(S): at 16:34

## 2019-07-11 RX ADMIN — SPIRONOLACTONE 50 MILLIGRAM(S): 25 TABLET, FILM COATED ORAL at 06:11

## 2019-07-11 RX ADMIN — Medication 40 MILLIGRAM(S): at 06:11

## 2019-07-11 RX ADMIN — Medication 40 MILLIGRAM(S): at 16:34

## 2019-07-11 NOTE — PROGRESS NOTE ADULT - PROBLEM SELECTOR PLAN 2
Patient with cirrhosis due to chronic hepatitis, Hx of variceal bleeds, however not on beta blocker  Does not follow up with hepatologist  MELD score 18 with estimated 3-4% 90-day mortality  Child-Benton class B, indication for transplant evaluation  Continue lasix and aldactone

## 2019-07-11 NOTE — PROGRESS NOTE ADULT - ATTENDING COMMENTS
Agree with above   patient seen and examined. c/o cough with is productive of white sputum.   Afebrile, no leukocytosis. RSV (+)   vital signs and labs reviewed   Vital Signs Last 24 Hrs  T(C): 36.3 (11 Jul 2019 14:36), Max: 36.9 (10 Jul 2019 19:02)  T(F): 97.4 (11 Jul 2019 14:36), Max: 98.4 (10 Jul 2019 19:02)  HR: 83 (11 Jul 2019 14:36) (78 - 88)  BP: 95/59 (11 Jul 2019 14:36) (95/59 - 132/76)  BP(mean): --  RR: 17 (11 Jul 2019 14:36) (17 - 18)  SpO2: 98% (11 Jul 2019 14:36) (98% - 100%)    1. Viral bronchitis with cough. started Mucinex 1200 mg po twice daily   Antibiotics not indicated     2. Liver cirrhosis secondary to hepatitis C. Does not look in fluid overload or having an ascites. Increase spironolactone to 100 mg po daily and switch Furosemide to PO 40 mg po daily.   3. Thrombocytopenia secondary to liver cirrhosis.     Awaiting ECHO, discharge in AM

## 2019-07-11 NOTE — PROGRESS NOTE ADULT - PROBLEM SELECTOR PLAN 1
Patient presents with shortness of breath, cough  likely secondary  to  viral  infection causing URI   CONTACT  precautions  patient will  get an ECHO  { TO  R/O  any  right heart failure given the prolonged h/o  liver cirrhosis }

## 2019-07-11 NOTE — PROGRESS NOTE ADULT - SUBJECTIVE AND OBJECTIVE BOX
Patient is a 64y old  Female who presents with a chief complaint of cough and difficulty breathing (10 Jul 2019 14:08)      INTERVAL HPI/OVERNIGHT EVENTS: using the Burkin[place]   [id number]    patient sitting on the bed and c/o  of cough  and with  minimal  sputum production and denies any nausea , vomiting or bad pain     MEDICATIONS  (STANDING):  furosemide   Injectable 40 milliGRAM(s) IV Push daily  spironolactone 50 milliGRAM(s) Oral daily    MEDICATIONS  (PRN):  benzonatate 100 milliGRAM(s) Oral three times a day PRN Cough  guaiFENesin    Syrup 200 milliGRAM(s) Oral every 6 hours PRN Cough      Allergies    No Known Allergies    Intolerances        REVIEW OF SYSTEMS:  CONSTITUTIONAL: No fever, weight loss, or fatigue  RESPIRATORY:  cough++ and shortness of breath  +  CARDIOVASCULAR: No chest pain, palpitations, dizziness, or leg swelling  GASTROINTESTINAL: No abdominal or epigastric pain. No nausea, vomiting, or hematemesis; No diarrhea or constipation. No melena or hematochezia.  NEUROLOGICAL: No headaches, memory loss, loss of strength, numbness, or tremors  EXTREMITIES :  leg swelling +   SKIN: right lower extremity chronic skin changes noticed     Vital Signs Last 24 Hrs  T(C): 36.4 (11 Jul 2019 05:42), Max: 36.9 (10 Jul 2019 15:00)  T(F): 97.5 (11 Jul 2019 05:42), Max: 98.4 (10 Jul 2019 15:00)  HR: 88 (11 Jul 2019 05:42) (78 - 88)  BP: 102/58 (11 Jul 2019 05:42) (102/58 - 132/76)  BP(mean): 85 (10 Jul 2019 15:00) (85 - 85)  RR: 17 (11 Jul 2019 05:42) (17 - 18)  SpO2: 99% (11 Jul 2019 05:42) (98% - 100%)    PHYSICAL EXAM:  GENERAL: NAD, well-groomed, well-developed  HEAD:  Atraumatic, Normocephalic  EYES: EOMI, PERRLA, conjunctiva and sclera clear  NECK: Supple, No JVD, Normal thyroid  CHEST/LUNG: Clear to percussion bilaterally; No rales, rhonchi, wheezing, or rubs  HEART: Regular rate and rhythm; No murmurs, rubs, or gallops  ABDOMEN: Soft, obese ,Nontender, distended  and bowel  sounds +  NERVOUS SYSTEM:  Alert & Oriented X3,no focal  deficits   EXTREMITIES:  2+ Peripheral Pulses, b/l  pedal  edema + and right lower extremity chronic skin changes +      LABS:                        13.1   5.99  )-----------( 66       ( 11 Jul 2019 06:11 )             40.2     07-11    136  |  104  |  18  ----------------------------<  80  4.3   |  28  |  0.76    Ca    8.5      11 Jul 2019 06:11    TPro  7.7  /  Alb  2.2<L>  /  TBili  2.3<H>  /  DBili  x   /  AST  34  /  ALT  30  /  AlkPhos  112  07-11    PT/INR - ( 10 Jul 2019 04:00 )   PT: 19.0 sec;   INR: 1.68 ratio         PTT - ( 10 Jul 2019 04:00 )  PTT:44.7 sec    CAPILLARY BLOOD GLUCOSE          RADIOLOGY & ADDITIONAL TESTS:    Imaging Personally Reviewed:  duplex lower extremity b/l  :  no  DVT Patient is a 64y old  Female who presents with a chief complaint of cough and difficulty breathing (10 Jul 2019 14:08)      INTERVAL HPI/OVERNIGHT EVENTS: using the Ukrainian   -[id number]    patient sitting on the bed and c/o  of cough  and with  minimal  sputum production and denies any nausea , vomiting or bad pain     MEDICATIONS  (STANDING):  furosemide   Injectable 40 milliGRAM(s) IV Push daily  spironolactone 50 milliGRAM(s) Oral daily    MEDICATIONS  (PRN):  benzonatate 100 milliGRAM(s) Oral three times a day PRN Cough  guaiFENesin    Syrup 200 milliGRAM(s) Oral every 6 hours PRN Cough      Allergies    No Known Allergies    Intolerances        REVIEW OF SYSTEMS:  CONSTITUTIONAL: No fever, weight loss, or fatigue  RESPIRATORY:  cough++ and shortness of breath  +  CARDIOVASCULAR: No chest pain, palpitations, dizziness, or leg swelling  GASTROINTESTINAL: No abdominal or epigastric pain. No nausea, vomiting, or hematemesis; No diarrhea or constipation. No melena or hematochezia.  NEUROLOGICAL: No headaches, memory loss, loss of strength, numbness, or tremors  EXTREMITIES :  leg swelling +   SKIN: right lower extremity chronic skin changes noticed     Vital Signs Last 24 Hrs  T(C): 36.4 (11 Jul 2019 05:42), Max: 36.9 (10 Jul 2019 15:00)  T(F): 97.5 (11 Jul 2019 05:42), Max: 98.4 (10 Jul 2019 15:00)  HR: 88 (11 Jul 2019 05:42) (78 - 88)  BP: 102/58 (11 Jul 2019 05:42) (102/58 - 132/76)  BP(mean): 85 (10 Jul 2019 15:00) (85 - 85)  RR: 17 (11 Jul 2019 05:42) (17 - 18)  SpO2: 99% (11 Jul 2019 05:42) (98% - 100%)    PHYSICAL EXAM:  GENERAL: NAD, well-groomed, well-developed  HEAD:  Atraumatic, Normocephalic  EYES: EOMI, PERRLA, conjunctiva and sclera clear  NECK: Supple, No JVD, Normal thyroid  CHEST/LUNG: Clear to percussion bilaterally; No rales, rhonchi, wheezing, or rubs  HEART: Regular rate and rhythm; No murmurs, rubs, or gallops  ABDOMEN: Soft, obese ,Nontender, distended  and bowel  sounds +  NERVOUS SYSTEM:  Alert & Oriented X3,no focal  deficits   EXTREMITIES:  2+ Peripheral Pulses, b/l  pedal  edema + and right lower extremity chronic skin changes +      LABS:                        13.1   5.99  )-----------( 66       ( 11 Jul 2019 06:11 )             40.2     07-11    136  |  104  |  18  ----------------------------<  80  4.3   |  28  |  0.76    Ca    8.5      11 Jul 2019 06:11    TPro  7.7  /  Alb  2.2<L>  /  TBili  2.3<H>  /  DBili  x   /  AST  34  /  ALT  30  /  AlkPhos  112  07-11    PT/INR - ( 10 Jul 2019 04:00 )   PT: 19.0 sec;   INR: 1.68 ratio         PTT - ( 10 Jul 2019 04:00 )  PTT:44.7 sec    CAPILLARY BLOOD GLUCOSE          RADIOLOGY & ADDITIONAL TESTS:    Imaging Personally Reviewed:  duplex lower extremity b/l  :  no  DVT

## 2019-07-12 ENCOUNTER — TRANSCRIPTION ENCOUNTER (OUTPATIENT)
Age: 64
End: 2019-07-12

## 2019-07-12 VITALS
SYSTOLIC BLOOD PRESSURE: 113 MMHG | HEART RATE: 95 BPM | DIASTOLIC BLOOD PRESSURE: 73 MMHG | RESPIRATION RATE: 18 BRPM | OXYGEN SATURATION: 96 % | TEMPERATURE: 98 F

## 2019-07-12 LAB
HCV RNA FLD QL NAA+PROBE: SIGNIFICANT CHANGE UP
HCV RNA SPEC QL PROBE+SIG AMP: SIGNIFICANT CHANGE UP

## 2019-07-12 PROCEDURE — 93970 EXTREMITY STUDY: CPT

## 2019-07-12 PROCEDURE — 82306 VITAMIN D 25 HYDROXY: CPT

## 2019-07-12 PROCEDURE — 76700 US EXAM ABDOM COMPLETE: CPT

## 2019-07-12 PROCEDURE — 82607 VITAMIN B-12: CPT

## 2019-07-12 PROCEDURE — 99285 EMERGENCY DEPT VISIT HI MDM: CPT | Mod: 25

## 2019-07-12 PROCEDURE — 99239 HOSP IP/OBS DSCHRG MGMT >30: CPT | Mod: GC

## 2019-07-12 PROCEDURE — 85730 THROMBOPLASTIN TIME PARTIAL: CPT

## 2019-07-12 PROCEDURE — 93005 ELECTROCARDIOGRAM TRACING: CPT

## 2019-07-12 PROCEDURE — 86803 HEPATITIS C AB TEST: CPT

## 2019-07-12 PROCEDURE — 82746 ASSAY OF FOLIC ACID SERUM: CPT

## 2019-07-12 PROCEDURE — 82248 BILIRUBIN DIRECT: CPT

## 2019-07-12 PROCEDURE — 96375 TX/PRO/DX INJ NEW DRUG ADDON: CPT

## 2019-07-12 PROCEDURE — 71046 X-RAY EXAM CHEST 2 VIEWS: CPT

## 2019-07-12 PROCEDURE — 84484 ASSAY OF TROPONIN QUANT: CPT

## 2019-07-12 PROCEDURE — 84145 PROCALCITONIN (PCT): CPT

## 2019-07-12 PROCEDURE — 83880 ASSAY OF NATRIURETIC PEPTIDE: CPT

## 2019-07-12 PROCEDURE — 36415 COLL VENOUS BLD VENIPUNCTURE: CPT

## 2019-07-12 PROCEDURE — 85610 PROTHROMBIN TIME: CPT

## 2019-07-12 PROCEDURE — 85027 COMPLETE CBC AUTOMATED: CPT

## 2019-07-12 PROCEDURE — 80061 LIPID PANEL: CPT

## 2019-07-12 PROCEDURE — 87631 RESP VIRUS 3-5 TARGETS: CPT

## 2019-07-12 PROCEDURE — 96374 THER/PROPH/DIAG INJ IV PUSH: CPT

## 2019-07-12 PROCEDURE — 84443 ASSAY THYROID STIM HORMONE: CPT

## 2019-07-12 PROCEDURE — 87040 BLOOD CULTURE FOR BACTERIA: CPT

## 2019-07-12 PROCEDURE — 87521 HEPATITIS C PROBE&RVRS TRNSC: CPT

## 2019-07-12 PROCEDURE — 80053 COMPREHEN METABOLIC PANEL: CPT

## 2019-07-12 PROCEDURE — 93306 TTE W/DOPPLER COMPLETE: CPT

## 2019-07-12 PROCEDURE — 83036 HEMOGLOBIN GLYCOSYLATED A1C: CPT

## 2019-07-12 RX ORDER — FUROSEMIDE 40 MG
1 TABLET ORAL
Qty: 30 | Refills: 0
Start: 2019-07-12 | End: 2019-08-10

## 2019-07-12 RX ORDER — SPIRONOLACTONE 25 MG/1
100 TABLET, FILM COATED ORAL DAILY
Refills: 0 | Status: DISCONTINUED | OUTPATIENT
Start: 2019-07-13 | End: 2019-07-12

## 2019-07-12 RX ORDER — SPIRONOLACTONE 25 MG/1
4 TABLET, FILM COATED ORAL
Qty: 120 | Refills: 0
Start: 2019-07-12 | End: 2019-08-10

## 2019-07-12 RX ADMIN — Medication 40 MILLIGRAM(S): at 06:01

## 2019-07-12 RX ADMIN — Medication 1200 MILLIGRAM(S): at 06:01

## 2019-07-12 RX ADMIN — Medication 100 MILLIGRAM(S): at 11:25

## 2019-07-12 RX ADMIN — Medication 100 MILLIGRAM(S): at 00:11

## 2019-07-12 RX ADMIN — SPIRONOLACTONE 50 MILLIGRAM(S): 25 TABLET, FILM COATED ORAL at 06:01

## 2019-07-12 RX ADMIN — Medication 1200 MILLIGRAM(S): at 06:03

## 2019-07-12 NOTE — DISCHARGE NOTE NURSING/CASE MANAGEMENT/SOCIAL WORK - NSDCDPATPORTLINK_GEN_ALL_CORE
You can access the [facility] Patient Portal, offered by [facility], by registering with the following website: [email or web address]

## 2019-07-12 NOTE — DISCHARGE NOTE PROVIDER - NSDCCPCAREPLAN_GEN_ALL_CORE_FT
PRINCIPAL DISCHARGE DIAGNOSIS  Diagnosis: Viral bronchitis  Assessment and Plan of Treatment:       SECONDARY DISCHARGE DIAGNOSES  Diagnosis: Cirrhosis  Assessment and Plan of Treatment: Cirrhosis PRINCIPAL DISCHARGE DIAGNOSIS  Diagnosis: Viral bronchitis  Assessment and Plan of Treatment: you were admitted for cough  with shortness of breath  and you were disgnosed with viral  bronchitis and antibiotics not indicated and will d/c with mucinex 1200 mg  for every 12 hours and please follow up  with PMD  within 2 weeks of d/c      SECONDARY DISCHARGE DIAGNOSES  Diagnosis: Cirrhosis  Assessment and Plan of Treatment: Liver cirrhosis secondary to hepatitis C.   Increased spironolactone to 100 mg po daily and c/w    Furosemide to PO 40 mg po daily.  please follow with hepatology outpatient PRINCIPAL DISCHARGE DIAGNOSIS  Diagnosis: Viral bronchitis  Assessment and Plan of Treatment: you were admitted for cough  with shortness of breath  and you were disgnosed with viral  bronchitis and antibiotics not indicated and will d/c with mucinex 1200 mg  for every 12 hours and please follow up  with PMD  within 2 weeks of d/c      SECONDARY DISCHARGE DIAGNOSES  Diagnosis: Cirrhosis  Assessment and Plan of Treatment: Liver cirrhosis secondary to hepatitis C.   Increased spironolactone to 100 mg po daily and c/w    Furosemide to PO 40 mg po daily.  please follow with hepatology outpatient    Diagnosis: Thrombocytopenia  Assessment and Plan of Treatment: secondary  to  liver cirrohsis   please follow with PMD out-patient for regular  monitoring

## 2019-07-12 NOTE — DISCHARGE NOTE PROVIDER - HOSPITAL COURSE
64F with PMH of liver cirrhosis (presumed due to chronic hepatitis), hepatitis c (RNA negative), cholecystitis, portal vein thrombosis, SVT, venous stasis, PSH of hysterectomy presented to ER with cough, chest pain and difficulty breathing for 3 days and patient had work-up done for above and cxr -clear  lungs RSV -positive and patient was admitted for viral bronchitis and given the h/o  liver cirrhosis and patient on lasix 40 mg and aldactone increased to  100 mg for diuresis and ECHO was performed any  possibility  of right heart  failure causing the present symptoms and     echo : 64F with PMH of liver cirrhosis (presumed due to chronic hepatitis), hepatitis c (RNA negative), cholecystitis, portal vein thrombosis, SVT, venous stasis, PSH of hysterectomy presented to ER with cough, chest pain and difficulty breathing for 3 days and patient had work-up done for above and cxr -clear  lungs RSV -positive and patient was admitted for viral bronchitis and given the h/o  liver cirrhosis and patient on lasix 40 mg and aldactone increased to  100 mg for diuresis and ECHO was performed any  possibility  of right heart  failure causing the present symptoms and     echo  performed  and patient is currently stable for D/C         DUPLEX B/L  lower extremities :  negative for blood clot    usg abd complete : Cholelithiasis.    Splenomegaly. 64F with PMH of liver cirrhosis (presumed due to chronic hepatitis), hepatitis c (RNA negative), cholecystitis, portal vein thrombosis, SVT, venous stasis, PSH of hysterectomy presented to ER with cough, chest pain and difficulty breathing for 3 days.         Admitted for viral bronchitis. On admission had positive RVP (RSV virus)  No signs of PNA on CXR. Antibiotics not indicated. Was given Mucinex for cough.     Given her history of liver cirrhosis some concern was that SOB was secondary to heart failure vs worsening of abdominal ascites. US of abdomen did not show ascites, but found to have cholelithiasis and splenomegaly.    Adjustment were made to her medications; spironolactone increased to 100 mg daily and furosemide to 40 mg po daily.         ECHO was performed and pending results.

## 2019-07-12 NOTE — DISCHARGE NOTE PROVIDER - CARE PROVIDER_API CALL
Terence Le)  Gastroenterology; Internal Medicine  [address] [place] [address]  Phone: [phone]  Fax: [phone]  Follow Up Time:

## 2019-07-15 LAB
CULTURE RESULTS: SIGNIFICANT CHANGE UP
CULTURE RESULTS: SIGNIFICANT CHANGE UP
SPECIMEN SOURCE: SIGNIFICANT CHANGE UP
SPECIMEN SOURCE: SIGNIFICANT CHANGE UP

## 2019-09-22 ENCOUNTER — EMERGENCY (EMERGENCY)
Facility: HOSPITAL | Age: 64
LOS: 1 days | Discharge: HOME | End: 2019-09-22
Attending: EMERGENCY MEDICINE
Payer: MEDICAID

## 2019-09-22 VITALS
OXYGEN SATURATION: 99 % | WEIGHT: 210.1 LBS | SYSTOLIC BLOOD PRESSURE: 113 MMHG | TEMPERATURE: 98 F | RESPIRATION RATE: 19 BRPM | DIASTOLIC BLOOD PRESSURE: 75 MMHG | HEART RATE: 90 BPM

## 2019-09-22 PROCEDURE — 99284 EMERGENCY DEPT VISIT MOD MDM: CPT

## 2019-09-22 PROCEDURE — 71046 X-RAY EXAM CHEST 2 VIEWS: CPT | Mod: 26

## 2019-09-22 RX ORDER — GUAIFENESIN/DEXTROMETHORPHAN 600MG-30MG
10 TABLET, EXTENDED RELEASE 12 HR ORAL ONCE
Refills: 0 | Status: COMPLETED | OUTPATIENT
Start: 2019-09-22 | End: 2019-09-22

## 2019-09-22 RX ORDER — IBUPROFEN 200 MG
600 TABLET ORAL ONCE
Refills: 0 | Status: COMPLETED | OUTPATIENT
Start: 2019-09-22 | End: 2019-09-22

## 2019-09-22 RX ORDER — ALBUTEROL 90 UG/1
2.5 AEROSOL, METERED ORAL ONCE
Refills: 0 | Status: COMPLETED | OUTPATIENT
Start: 2019-09-22 | End: 2019-09-22

## 2019-09-22 NOTE — ED PROVIDER NOTE - CLINICAL SUMMARY MEDICAL DECISION MAKING FREE TEXT BOX
63 y/o with URI symptoms. Obtain chest X-ray, medications and reassess. 65 y/o with URI symptoms. XR without acute infiltrate. Symptoms improved after mucolytics, pain control and breathing treatment. Will discharge home with PMD follow up . Vital signs stable. Nontoxic and medically stable for discharged. Return precautions provided and patient understands to return to the ED for worsening signs and symptoms. Patient's questions answered.

## 2019-09-22 NOTE — ED PROVIDER NOTE - PATIENT PORTAL LINK FT
You can access the FollowMyHealth Patient Portal offered by [facility] by registering at the following website: [email or web address]. By joining [facility]’s FollowMyHealth portal, you will also be able to view your health information using other applications (apps) compatible with our system.

## 2019-09-22 NOTE — ED PROVIDER NOTE - PROGRESS NOTE DETAILS
patient improved after neb tx, mucloytics and pain control. Vital signs stable. Nontoxic and medically stable for discharged. Return precautions provided and patient understands to return to the ED for worsening signs and symptoms. Instructed to follow up with primary care physician and agreeable. Patient's questions answered.

## 2019-09-22 NOTE — ED PROVIDER NOTE - OBJECTIVE STATEMENT
#: [id number] (Javad)  65 y/o F patient with a significant PMHx of Cirrhosis, Edema of b/l legs, Cholecystitis, Hepatitis C, and a significant PSHx of hysterectomy presents to the ED with non-productive intermittent cough over the last x4 days associated with shortness of breath. Pt reports a lot of phlegm and mucous. Patient says she took left over meds she was Rx during a previous URI episode but doesn't recall names of them. Patient denies fever, nausea, vomiting, dysuria, hematuria, and any other complaints. Patient denies sick contacts or recent travel. NKDA.

## 2019-09-23 PROBLEM — R60.0 LOCALIZED EDEMA: Chronic | Status: ACTIVE | Noted: 2018-03-14

## 2019-09-23 PROBLEM — Z87.19 PERSONAL HISTORY OF OTHER DISEASES OF THE DIGESTIVE SYSTEM: Chronic | Status: ACTIVE | Noted: 2019-07-10

## 2019-09-23 PROCEDURE — 71046 X-RAY EXAM CHEST 2 VIEWS: CPT

## 2019-09-23 PROCEDURE — 99283 EMERGENCY DEPT VISIT LOW MDM: CPT | Mod: 25

## 2019-09-23 PROCEDURE — 94640 AIRWAY INHALATION TREATMENT: CPT

## 2019-09-23 RX ORDER — GUAIFENESIN/DEXTROMETHORPHAN 600MG-30MG
10 TABLET, EXTENDED RELEASE 12 HR ORAL
Qty: 200 | Refills: 0
Start: 2019-09-23 | End: 2019-09-27

## 2019-09-23 RX ADMIN — Medication 10 MILLILITER(S): at 00:00

## 2019-09-23 RX ADMIN — Medication 600 MILLIGRAM(S): at 00:02

## 2019-09-23 RX ADMIN — Medication 600 MILLIGRAM(S): at 00:01

## 2019-09-23 RX ADMIN — ALBUTEROL 2.5 MILLIGRAM(S): 90 AEROSOL, METERED ORAL at 00:02

## 2020-01-28 NOTE — PROGRESS NOTE ADULT - PROBLEM SELECTOR PROBLEM 8
SS following up with discharge planning. Pt's daughter contacted SS and reported that she no 
longer wants pt to go to [facility] for skilled nursing unit. She requested referral be 
phoned and faxed to [facility], [phone]; fax [phone]. SS phoned and faxed 
referral to [facility] as requested. SS will await acceptance decision and insurance 
determination and will proceed accordingly with discharge planning. Venous stasis

## 2020-02-13 NOTE — ED ADULT NURSE NOTE - MUSCULOSKELETAL ASSESSMENT
Subjective    Chief Complaint  This information was obtained from the patient  Patient is here for an initial exam for a left lower leg wound.  Patient being treated with IV antibiotic therapy, Dr. Chuck Medellin saw patient in the hospital. Patient states the Esperanza Betancur Cancer - Maternal Grandparents, Paternal Grandparents, Diabetes - Father, Heart Disease - Maternal Grandparents, Paternal Grandparents, Hypertension - Maternal Grandparents, Paternal Grandparents, Kidney Disease - No History, Lung Disease - Maternal Grandp Psychiatric: Claustrophobia, Memory Loss, Depression    Additional Information  Medication reconciliation completed at today's visit. : Yes  History of chemotherapy?: No  History of radiation?: No    Medications  acetaminophen - oral 325 mg tablet  ondanse Constitutional  bp wnl for patient. Pulse Regular and wnl for patient. Arch Susana Respirations easy and unlabored. Temperature wnl. Obese. Appearance neat and clean. Appears in no acute distress. Well nourished and well developed.     Cardiovascular:  dp/pt palpable d/w patient that most likely the cellulitis  that she was admitted for developed an abcess or compartment syndrome on the ankle. discussed with dr Santy Burkett will direct admit patient.   ortho consulted, patient npo after midnoc, patient will most likely go to [facility]

## 2021-08-14 NOTE — ED ADULT TRIAGE NOTE - PAIN RATING/NUMBER SCALE (0-10): ACTIVITY
Patient is confused , irritable, and demanding. Refused all am medications.\" I don't need any medication\" Where am I. Why am I here!\" What is that, \"referring to the catheter.\" 1:1 sitter at bedside for fall risk. Monitored for safety.   10

## 2023-10-30 NOTE — ED ADULT TRIAGE NOTE - ACCOMPANIED BY
Addended by: Vero Ghosh on: 1/26/2022 07:05 PM     Modules accepted: Orders
Self
Simple / Intermediate / Complex Repair - Final Wound Length In Cm: 4.5

## 2024-01-11 NOTE — PATIENT PROFILE ADULT - SAFE PLACE TO LIVE
Spoke with Paula From [employer] advised that antibiotic therapy has been extended for 2 weeks.also to not pull PICC until ID evaluate patient.    no

## 2024-05-24 NOTE — ED ADULT NURSE NOTE - CAS TRG GEN SKIN CONDITION
"Ambulatory w/complaint of mid sternal chest pain x3 months; denies history of cardiac history; denies SOB; nothing taken OTC for pain PTA; admits to history of pancreatitis \"I can't tell if it's that maybe\"  " Warm
